# Patient Record
Sex: MALE | Race: WHITE | Employment: OTHER | URBAN - METROPOLITAN AREA
[De-identification: names, ages, dates, MRNs, and addresses within clinical notes are randomized per-mention and may not be internally consistent; named-entity substitution may affect disease eponyms.]

---

## 2021-06-28 ENCOUNTER — TELEPHONE (OUTPATIENT)
Dept: PULMONOLOGY | Age: 56
End: 2021-06-28

## 2021-06-28 NOTE — TELEPHONE ENCOUNTER
Pt calling to let you know that his sob has increased a lot with all the humidity we have been having lately and nothing has been helping. He said he does not think it is from his heart. He has a follow up scheduled with you on 8/6/21. Please advise if he should be seen sooner than this date?

## 2021-06-29 NOTE — TELEPHONE ENCOUNTER
I attempted to contact patient. Phone number we have listed for patient is incorrect. I also tried to call [facility] and that number is no longer in service.     Hopefully patient will call back and we can try to schedule him for Thursday, 7/1/21 @ 1 PM.

## 2021-07-30 RX ORDER — ATORVASTATIN CALCIUM 40 MG/1
TABLET, FILM COATED ORAL
COMMUNITY
Start: 2021-05-13

## 2021-07-30 RX ORDER — CARVEDILOL 12.5 MG/1
TABLET ORAL
COMMUNITY
Start: 2021-05-13

## 2021-07-30 RX ORDER — MONTELUKAST SODIUM 10 MG/1
TABLET ORAL
COMMUNITY
Start: 2021-05-13 | End: 2021-11-19 | Stop reason: SDUPTHER

## 2021-07-30 RX ORDER — TEMAZEPAM 15 MG/1
CAPSULE ORAL
COMMUNITY
Start: 2021-05-28 | End: 2021-11-19 | Stop reason: SDUPTHER

## 2021-07-30 RX ORDER — BUSPIRONE HYDROCHLORIDE 5 MG/1
TABLET ORAL
COMMUNITY
Start: 2021-05-10

## 2021-07-30 RX ORDER — FUROSEMIDE 20 MG/1
TABLET ORAL
COMMUNITY
Start: 2021-06-28

## 2021-07-30 RX ORDER — AMLODIPINE BESYLATE 5 MG/1
TABLET ORAL
COMMUNITY
Start: 2021-05-01

## 2021-07-30 RX ORDER — NITROGLYCERIN 0.4 MG/1
TABLET SUBLINGUAL
COMMUNITY
Start: 2021-06-28

## 2021-07-30 RX ORDER — SACUBITRIL AND VALSARTAN 49; 51 MG/1; MG/1
TABLET, FILM COATED ORAL
COMMUNITY
Start: 2021-05-13

## 2021-07-30 RX ORDER — ALBUTEROL SULFATE 90 UG/1
AEROSOL, METERED RESPIRATORY (INHALATION)
COMMUNITY
Start: 2021-05-28 | End: 2021-11-19 | Stop reason: SDUPTHER

## 2021-07-30 RX ORDER — CHOLECALCIFEROL (VITAMIN D3) 125 MCG
CAPSULE ORAL
COMMUNITY
Start: 2021-05-13

## 2021-07-30 RX ORDER — PAROXETINE HYDROCHLORIDE 20 MG/1
TABLET, FILM COATED ORAL
COMMUNITY
Start: 2021-05-10

## 2021-08-06 ENCOUNTER — OFFICE VISIT (OUTPATIENT)
Dept: PULMONOLOGY | Age: 56
End: 2021-08-06
Payer: MEDICARE

## 2021-08-06 VITALS
TEMPERATURE: 98.1 F | OXYGEN SATURATION: 99 % | WEIGHT: 281.4 LBS | HEART RATE: 71 BPM | BODY MASS INDEX: 40.29 KG/M2 | SYSTOLIC BLOOD PRESSURE: 127 MMHG | RESPIRATION RATE: 16 BRPM | HEIGHT: 70 IN | DIASTOLIC BLOOD PRESSURE: 69 MMHG

## 2021-08-06 DIAGNOSIS — Z87.891 FORMER SMOKER: ICD-10-CM

## 2021-08-06 DIAGNOSIS — J44.9 CHRONIC OBSTRUCTIVE PULMONARY DISEASE, UNSPECIFIED COPD TYPE (HCC): Primary | ICD-10-CM

## 2021-08-06 DIAGNOSIS — G47.10 HYPERSOMNOLENCE: ICD-10-CM

## 2021-08-06 DIAGNOSIS — G47.00 INSOMNIA, UNSPECIFIED TYPE: ICD-10-CM

## 2021-08-06 DIAGNOSIS — I50.9 CONGESTIVE HEART FAILURE, UNSPECIFIED HF CHRONICITY, UNSPECIFIED HEART FAILURE TYPE (HCC): ICD-10-CM

## 2021-08-06 DIAGNOSIS — G47.33 OBSTRUCTIVE SLEEP APNEA: ICD-10-CM

## 2021-08-06 DIAGNOSIS — E66.9 CLASS 2 OBESITY WITHOUT SERIOUS COMORBIDITY WITH BODY MASS INDEX (BMI) OF 38.0 TO 38.9 IN ADULT, UNSPECIFIED OBESITY TYPE: ICD-10-CM

## 2021-08-06 PROCEDURE — 94664 DEMO&/EVAL PT USE INHALER: CPT | Performed by: INTERNAL MEDICINE

## 2021-08-06 PROCEDURE — 99214 OFFICE O/P EST MOD 30 MIN: CPT | Performed by: INTERNAL MEDICINE

## 2021-08-06 RX ORDER — TEMAZEPAM 30 MG/1
30 CAPSULE ORAL NIGHTLY PRN
Qty: 30 CAPSULE | Refills: 2 | Status: SHIPPED | OUTPATIENT
Start: 2021-08-06 | End: 2021-08-20

## 2021-08-06 ASSESSMENT — ENCOUNTER SYMPTOMS
ALLERGIC/IMMUNOLOGIC NEGATIVE: 1
RESPIRATORY NEGATIVE: 1
GASTROINTESTINAL NEGATIVE: 1
EYES NEGATIVE: 1

## 2021-08-06 ASSESSMENT — SLEEP AND FATIGUE QUESTIONNAIRES
HOW LIKELY ARE YOU TO NOD OFF OR FALL ASLEEP WHILE SITTING AND READING: 3
HOW LIKELY ARE YOU TO NOD OFF OR FALL ASLEEP IN A CAR, WHILE STOPPED FOR A FEW MINUTES IN TRAFFIC: 0
HOW LIKELY ARE YOU TO NOD OFF OR FALL ASLEEP WHILE LYING DOWN TO REST IN THE AFTERNOON WHEN CIRCUMSTANCES PERMIT: 2
HOW LIKELY ARE YOU TO NOD OFF OR FALL ASLEEP WHILE WATCHING TV: 2
ESS TOTAL SCORE: 10
HOW LIKELY ARE YOU TO NOD OFF OR FALL ASLEEP WHILE SITTING QUIETLY AFTER LUNCH WITHOUT ALCOHOL: 1
HOW LIKELY ARE YOU TO NOD OFF OR FALL ASLEEP WHEN YOU ARE A PASSENGER IN A CAR FOR AN HOUR WITHOUT A BREAK: 1
HOW LIKELY ARE YOU TO NOD OFF OR FALL ASLEEP WHILE SITTING AND TALKING TO SOMEONE: 0
HOW LIKELY ARE YOU TO NOD OFF OR FALL ASLEEP WHILE SITTING INACTIVE IN A PUBLIC PLACE: 1

## 2021-08-06 NOTE — PROGRESS NOTES
MA Communication:   The following orders are received by verbal communication from Elier Cueva MD    Orders include:  PFT and 3 mo fu scheduled 11/5/21

## 2021-08-06 NOTE — PATIENT INSTRUCTIONS
ASSESSMENT/PLAN:  1. Chronic obstructive pulmonary disease, unspecified COPD type ([address]  2. Obstructive sleep apnea  3. Hypersomnolence  4. Class 2 obesity without serious comorbidity with body mass index (BMI) of 38.0 to 38.9 in adult, unspecified obesity type  5. Insomnia, unspecified type  6. Former smoker  7. Congestive heart failure, unspecified HF chronicity, unspecified heart failure type ([address]    Advised to avoid driving when too sleepy to function safely and given a discussion of the risks of untreated apnea such as accidents, cognitive impairment, mood impairment, high blood pressure, various cardiac diseases and stroke. Weight loss was encouraged. Sleep study showed ahi of 52  Done as split night study  Done 9/4/18  Wt was 258 lbs  Started on autopap of min of 12 and max of 18    DME is [facility]  Set up date 4/3/21  autopap is set up at min of 13 and max of 17  epr is off  Standard response  Nasal mask- Wisp    Using 100% of time  Using 8 h/night    90% pressure is 16.3  Leak at 24 lpm  No sleep apnea, ahi is 2.8    I have access via [employer]    I will increase min to 14 and max of 18  epr is 3  Call me 1 month    Sinus  flonase 2 spray/nostril at night, as needed  singulair once a night    12/2/19  FENO was 6 ppb      8/4/18  PFT  TEST DATE:  08/03/2018     TEST PERFORMED:  Spirometry pre and post bronchodilators, lung volumes, diffusion capacity. FINDINGS:  Effort is reproducible and adequate for interpretation.  Expiratory flow rates are normal but both FEV-1 and FVC fall below the normal range.  There is a borderline improvement after bronchodilators.  Lung volumes showed normal total lung capacity.  Diffusion capacity mildly reduced to 71% predicted.  Oxygen saturation on 2 liters 98%.      IMPRESSION:  Probable combined restrictive and obstructive defect based on bronchodilator response.  The overall degree is mild.  Diffusion capacity is mildly reduced.  Oxygen saturation on 2 liters within normal range. Asthma  Albuterol (ventolin) as needed  has spacer  Not really helping  singulair     Will give   stiolto 2 puffs once a day, given  First dose in office and explained how it works      Tried and failed with  Symbicort  qvar  stiolto  anoro- breathing more labored  Trelegy- caused bp problems    I suspect the cardiac disease has more to do with Mando's breathing issues  I have tried multiple inhalers and still no relief  I would suggest more cardiac intervention    Sleepiness is better    Stopped all vaping and smoking - > 3 years    Insomnia  Will start on   Restoril at 30 mg at night      Patient has an ejection fraction of less than 25%, being evaluated by cardiology. Patient is on multiple medications for this. Patient also has elevated creatinine last creatinine, was 5/26/2021 which was 1.66.    RTC in 3 months    Remember to bring a list of pulmonary medications and any CPAP or BiPAP machines to your next appointment with the office. Please keep all of your future appointments scheduled by [facility] office. Out of respect for other patients and providers, you may be asked to reschedule your appointment if you arrive later than your scheduled appointment time. Appointments cancelled less than 24hrs in advance will be considered a no show. Patients with three missed appointments within 1 year or four missed appointments within 2 years can be dismissed from the practice. Please be aware that our physicians are required to work in the Intensive Care Unit at [facility].  Your appointment may need to be rescheduled if they are designated to work during your appointment time. You may receive a survey regarding the care you received during your visit. Your input is valuable to us. We encourage you to complete and return your survey. We hope you will choose us in the future for your healthcare needs.      Pt instructed of all future appointment dates & times, including radiology, labs, procedures & referrals. If procedures were scheduled preparation instructions provided. Instructions on future appointments with [facility] were given.

## 2021-08-06 NOTE — PROGRESS NOTES
Liberty Wilkerson (:  1965) is a 60 y.o. male,Established patient, here for evaluation of the following chief complaint(s):  Follow-up (pulm/sleep transfer from [facility])         ASSESSMENT/PLAN:  1. Chronic obstructive pulmonary disease, unspecified COPD type ([facility]  2. Obstructive sleep apnea  3. Hypersomnolence  4. Class 2 obesity without serious comorbidity with body mass index (BMI) of 38.0 to 38.9 in adult, unspecified obesity type  5. Insomnia, unspecified type  6. Former smoker  7. Congestive heart failure, unspecified HF chronicity, unspecified heart failure type ([facility]    Advised to avoid driving when too sleepy to function safely and given a discussion of the risks of untreated apnea such as accidents, cognitive impairment, mood impairment, high blood pressure, various cardiac diseases and stroke. Weight loss was encouraged. Sleep study showed ahi of 52  Done as split night study  Done 18  Wt was 258 lbs  Started on autopap of min of 12 and max of 18    DME is [facility]  Set up date 4/3/21  autopap is set up at min of 13 and max of 17  epr is off  Standard response  Nasal mask- Wisp    Using 100% of time  Using 8 h/night    90% pressure is 16.3  Leak at 24 lpm  No sleep apnea, ahi is 2.8    I have access via [employer]    I will increase min to 14 and max of 18  epr is 3  Call me 1 month    Sinus  flonase 2 spray/nostril at night, as needed  singulair once a night    19  FENO was 6 ppb      18  PFT  TEST DATE:  2018     TEST PERFORMED:  Spirometry pre and post bronchodilators, lung volumes, diffusion capacity. FINDINGS:  Effort is reproducible and adequate for interpretation.  Expiratory flow rates are normal but both FEV-1 and FVC fall below the normal range.  There is a borderline improvement after bronchodilators.  Lung volumes showed normal total lung capacity.  Diffusion capacity mildly reduced to 71% predicted.  Oxygen saturation on 2 liters 98%. IMPRESSION:  Probable combined restrictive and obstructive defect based on bronchodilator response.  The overall degree is mild.  Diffusion capacity is mildly reduced.  Oxygen saturation on 2 liters within normal range. Asthma  Albuterol (ventolin) as needed  has spacer  Not really helping  singulair     Will give   stiolto 2 puffs once a day, given  First dose in office and explained how it works      Tried and failed with  Symbicort  qvar  stiolto  anoro- breathing more labored  Trelegy- caused bp problems    I suspect the cardiac disease has more to do with Mando's breathing issues  I have tried multiple inhalers and still no relief  I would suggest more cardiac intervention    Sleepiness is better    Stopped all vaping and smoking - > 3 years    Insomnia  Will start on   Restoril at 30 mg at night      Patient has an ejection fraction of less than 25%, being evaluated by cardiology. Patient is on multiple medications for this. Patient also has elevated creatinine last creatinine, was 5/26/2021 which was 1.66.    RTC in 3 months                  No follow-ups on file. Subjective   SUBJECTIVE/OBJECTIVE:      Seen in hospital [facility]    52year-old gentleman who was admitted on 08/01 because of waking up in the middle of the morning having an episode of palpitations, shortness of breath, and then all of a sudden feeling \"weird\".  The patient has a history of coronary disease, obesity, hypertension, and has been taking medications for many years, about 10 years, came into the ER showing SVT and then finally had to have cardioversion. The patient has been seen by Cardiology.     He does report that he snores. He has witnessed apneas, waking up, chocking, gasping, nocturia about three to four x a night, dry mouth, sleepiness, hypersomnolence at night. Neck size is 19 inches and [place] is about 12 out of 24. The patient denies any RLS symptoms.  Sleepy during the daytime.     He does have a history of smoking, has complained of shortness of breath, no chest pain, has complained of shortness of breath, no chest pain, some wheezing, has more shortness of breath since coming and having the cardioversion.           PAST MEDICAL HISTORY:  1.  Hypertension. 2.  Antidepressive disorder. 3. Coronary artery disease. 4. Anxiety. 5. Acute malfunction in the past.     PAST SURGICAL HISTORY: Cardiac catheterization.     FAMILY HISTORY:  Positive for diabetes, stroke, heart disease, alcohol abuse in the family.     SOCIAL HISTORY:  Current smoker of about half pack and had been a heavy smoker in the past. Positive 30+ pack years.  Alcohol use, 5 to 6 beers about once a month, and some occasional marijuana use.     ALLERGIES:  Are aspirin and Crestor.       Discussion /Plan:        1. PFT c/w asthma most likely  2. Would plan on d/c with albuterol with spacer as needed  3. Stop smoking  4. Will set up with outpatient sleep study  5. Will do split night  6. Will get back in the office after the study  7. Order in computer  8.  Can d/c when ready     Tian Hussein MD     [facility]  [phone] [facility])    Since having cpap    Using humidity  And having some sinus    Having congestion in the sinus    breathign has been doing well    Recently feeling like he is suffocating  Feels like he is having water back in his lungs  \\    Going to bed at 11 pm and waking up 830 am  No snoring  No bloating  No burping    No chest pain  No ear popping    No rls  No plms    Pressure feels good    Having issues with the machine  Having sinus issues  And some pressure issues  No chest pain      More issues with the sinus  May     Feels like somehting in chest      Now with pacemaker and defib    Some breathing issues since getting exposed to mold    Having issues with the cpap    Has tried symbicort and qvar  Ventolin    In the center of the chest has some breathing/\"suffocating\" sensation  But the oxygen levels are good    Having more issues with fatigue and some insomnia  Has tried restoril in the past and seemed to help                 Patient was last seen by Dr. Irina Collado on 6/28/2021    1. Ischemic cardiomyopathy:   His LVEF is still severely reduced although modestly improved to 25%. His blood pressure is elevated although he has some dizziness interestingly enough. Continue on low dose Entresto.    Discontinued his enalapril. Continue his beta-blocker. 2. Hypertension:   Continue Toprol somewhat 100 mg per day, which is a higher dose. 3. LBBB:   He has some fatigue nearing the end of the day, which may be multifactorial from his obesity as well as COPD and his cardiomyopathy. We will discuss with EP whether a CRT device would be beneficial or not. Since last visit  Having more issues with the heat  Using albuterol more    Snoring  And waking more at night  Sleepiness is getting worse      Review of Systems   Constitutional: Negative. HENT: Negative. Eyes: Negative. Respiratory: Negative. Cardiovascular: Negative. Gastrointestinal: Negative. Endocrine: Negative. Genitourinary: Negative. Musculoskeletal: Negative. Skin: Negative. Allergic/Immunologic: Negative. Neurological: Negative. Hematological: Negative. Psychiatric/Behavioral: Negative. Objective   Physical Exam  Vitals and nursing note reviewed. Constitutional:       General: He is not in acute distress. Appearance: Normal appearance. He is not ill-appearing. HENT:      Head: Normocephalic and atraumatic. Right Ear: External ear normal.      Left Ear: External ear normal.      Nose: Nose normal.      Mouth/Throat:      Mouth: Mucous membranes are moist.      Pharynx: Oropharynx is clear. Comments: Mallampati 3  Eyes:      General: No scleral icterus. Extraocular Movements: Extraocular movements intact.       Conjunctiva/sclera: Conjunctivae normal.      Pupils: Pupils are equal, round, and reactive to light. Cardiovascular:      Rate and Rhythm: Normal rate and regular rhythm. Pulses: Normal pulses. Heart sounds: Normal heart sounds. No murmur heard. No friction rub. Pulmonary:      Effort: No respiratory distress. Breath sounds: No stridor. No wheezing or rhonchi. Comments: Dec bs b/l  Abdominal:      General: Abdomen is flat. Bowel sounds are normal. There is no distension. Tenderness: There is no abdominal tenderness. There is no guarding. Musculoskeletal:         General: No swelling or tenderness. Normal range of motion. Cervical back: Normal range of motion and neck supple. No rigidity. Skin:     General: Skin is warm and dry. Coloration: Skin is not jaundiced. Neurological:      General: No focal deficit present. Mental Status: He is alert and oriented to person, place, and time. Mental status is at baseline. Cranial Nerves: No cranial nerve deficit. Sensory: No sensory deficit. Motor: No weakness. Gait: Gait normal.   Psychiatric:         Mood and Affect: Mood normal.         Thought Content: Thought content normal.         Judgment: Judgment normal.            On this date 8/6/2021 I have spent 25 minutes reviewing previous notes, test results and face to face with the patient discussing the diagnosis and importance of compliance with the treatment plan as well as documenting on the day of the visit.       An electronic signature was used to authenticate this note.    --Miguel Mitchell MD

## 2021-08-06 NOTE — LETTER
8/6/21        Dalene Bumpers      I have seen this patient in the office today and wanted to communicate my findings and recommendations. Patient Instructions      ASSESSMENT/PLAN:  1. Chronic obstructive pulmonary disease, unspecified COPD type ([address]  2. Obstructive sleep apnea  3. Hypersomnolence  4. Class 2 obesity without serious comorbidity with body mass index (BMI) of 38.0 to 38.9 in adult, unspecified obesity type  5. Insomnia, unspecified type  6. Former smoker  7. Congestive heart failure, unspecified HF chronicity, unspecified heart failure type ([address]    Advised to avoid driving when too sleepy to function safely and given a discussion of the risks of untreated apnea such as accidents, cognitive impairment, mood impairment, high blood pressure, various cardiac diseases and stroke. Weight loss was encouraged. Sleep study showed ahi of 52  Done as split night study  Done 9/4/18  Wt was 258 lbs  Started on autopap of min of 12 and max of 18    DME is [facility]  Set up date 4/3/21  autopap is set up at min of 13 and max of 17  epr is off  Standard response  Nasal mask- Wisp    Using 100% of time  Using 8 h/night    90% pressure is 16.3  Leak at 24 lpm  No sleep apnea, ahi is 2.8    I have access via [employer]    I will increase min to 14 and max of 18  epr is 3  Call me 1 month    Sinus  flonase 2 spray/nostril at night, as needed  singulair once a night    12/2/19  FENO was 6 ppb      8/4/18  PFT  TEST DATE:  08/03/2018     TEST PERFORMED:  Spirometry pre and post bronchodilators, lung volumes, diffusion capacity. FINDINGS:  Effort is reproducible and adequate for interpretation.  Expiratory flow rates are normal but both FEV-1 and FVC fall below the normal range.  There is a borderline improvement after bronchodilators.  Lung volumes showed normal total lung capacity.  Diffusion capacity mildly reduced to 71% predicted.  Oxygen saturation on 2 liters 98%.      IMPRESSION: Grecia Gruber combined restrictive and obstructive defect based on bronchodilator response.  The overall degree is mild.  Diffusion capacity is mildly reduced.  Oxygen saturation on 2 liters within normal range. Asthma  Albuterol (ventolin) as needed  has spacer  Not really helping  singulair     Will give   stiolto 2 puffs once a day, given  First dose in office and explained how it works      Tried and failed with  Symbicort  qvar  stiolto  anoro- breathing more labored  Trelegy- caused bp problems    I suspect the cardiac disease has more to do with Mando's breathing issues  I have tried multiple inhalers and still no relief  I would suggest more cardiac intervention    Sleepiness is better    Stopped all vaping and smoking - > 3 years    Insomnia  Will start on   Restoril at 30 mg at night      Patient has an ejection fraction of less than 25%, being evaluated by cardiology. Patient is on multiple medications for this.   Patient also has elevated creatinine last creatinine, was 5/26/2021 which was 1.66.    RTC in 3 months                   Thank you for allowing me to assist in the care of the Jessica Painter MD

## 2021-11-05 ENCOUNTER — TELEPHONE (OUTPATIENT)
Dept: PULMONOLOGY | Age: 56
End: 2021-11-05

## 2021-11-05 NOTE — LETTER
[facility] Critical Care and Sleep  Jenifer Antonio [address] [place] [address] Road  Phone: [phone]  Fax: [phone]    Kacie Munoz MD        November 18, 2021    [facility] [address]      Dear Tierra Holcomb:      I am writing you because I have been informed of your missed appointment(s) and your missed PFT WHICH WAS SCHEDULED FOR 11/5/21 AND OV 11/5/21  We ask that you please call 1 business day in advance if you are unable to make your appointment so that we can give that time to another patient in need. We care about you and the management of your healthcare and want to make sure that you follow up as recommended. As your Pulmonologist I must stress to you how important it is for you to have the tests I order as well to see me in follow up. The tests are necessary so that I can monitor your pulmonary nodule for any changes that could be cancer. I have to also mention, that in an effort to provide quality care and timely appointments to all my patients, it is our policy that patients be discharged from the practice if they do not show for three scheduled appointments. You would be informed of this termination by mail, and would have 30 days from the date of discharge to locate another physician. We're sorry you were unable to keep your appointment and hope that you are doing well. We would like to continue treating your healthcare needs. Please call the office to let us know your plans for treatment and to reschedule your appointment. If you have any questions or concerns, please don't hesitate to call.     Sincerely,        Kacie Munoz MD

## 2021-11-05 NOTE — LETTER
[facility] Critical [facility] Sleep  Jenifer Antonio [address] [facility]  Phone: [phone]  Fax: [phone]    Fco Alvarez MD        November 18, 2021    [facility] [address]      Dear Mariam Beckwith:      I am writing you because I have been informed of your missed appointment(s) and your missed PFT WHICH WAS SCHEDULED FOR 11/5/21 AND OV 11/5/21  We ask that you please call 1 business day in advance if you are unable to make your appointment so that we can give that time to another patient in need. We care about you and the management of your healthcare and want to make sure that you follow up as recommended. As your Pulmonologist I must stress to you how important it is for you to have the tests I order as well to see me in follow up. The tests are necessary so that I can monitor your pulmonary nodule for any changes that could be cancer. I have to also mention, that in an effort to provide quality care and timely appointments to all my patients, it is our policy that patients be discharged from the practice if they do not show for three scheduled appointments. You would be informed of this termination by mail, and would have 30 days from the date of discharge to locate another physician. We're sorry you were unable to keep your appointment and hope that you are doing well. We would like to continue treating your healthcare needs. Please call the office to let us know your plans for treatment and to reschedule your appointment. If you have any questions or concerns, please don't hesitate to call.     Sincerely,        Fco Alvarez MD

## 2021-11-05 NOTE — TELEPHONE ENCOUNTER
Patient did not show for 3 mo PFT follow up appointment  with Dr. Mat Cloud on 11/5/21    Same Day Cancellation: No    Patient rescheduled:  No    New appointment:     Patient was also no show on: N/A    Pt No Showed for PFT today as well

## 2021-11-09 NOTE — TELEPHONE ENCOUNTER
Call patient to reschedule a not show appt on 11/05/21 with Dr Dinorah Barajas, patient phone is not working at this time.

## 2021-11-19 DIAGNOSIS — G47.00 INSOMNIA, UNSPECIFIED TYPE: Primary | ICD-10-CM

## 2021-11-19 RX ORDER — ALBUTEROL SULFATE 90 UG/1
AEROSOL, METERED RESPIRATORY (INHALATION)
Qty: 18 G | Refills: 11 | Status: SHIPPED | OUTPATIENT
Start: 2021-11-19 | End: 2021-11-22

## 2021-11-19 RX ORDER — MONTELUKAST SODIUM 10 MG/1
TABLET ORAL
Qty: 30 TABLET | Refills: 11 | Status: SHIPPED | OUTPATIENT
Start: 2021-11-19 | End: 2021-11-22

## 2021-11-19 RX ORDER — TEMAZEPAM 30 MG/1
CAPSULE ORAL
Qty: 30 CAPSULE | Refills: 3 | Status: SHIPPED | OUTPATIENT
Start: 2021-11-19 | End: 2021-12-19

## 2021-11-19 NOTE — TELEPHONE ENCOUNTER
Patient call for DX refill on Montelukast 10mg tablet,albuterol sulfate  (90) and Restoril 30mg (dosage change on pt last visit 8/6/21)    Jinny

## 2021-11-22 RX ORDER — MONTELUKAST SODIUM 10 MG/1
TABLET ORAL
Qty: 90 TABLET | Refills: 3 | Status: SHIPPED | OUTPATIENT
Start: 2021-11-22 | End: 2022-01-04 | Stop reason: SDUPTHER

## 2021-11-22 RX ORDER — ALBUTEROL SULFATE 90 UG/1
AEROSOL, METERED RESPIRATORY (INHALATION)
Qty: 54 G | Refills: 3 | Status: SHIPPED | OUTPATIENT
Start: 2021-11-22 | End: 2021-12-15

## 2021-12-15 RX ORDER — ALBUTEROL SULFATE 90 UG/1
AEROSOL, METERED RESPIRATORY (INHALATION)
Qty: 3 EACH | Refills: 3 | Status: SHIPPED | OUTPATIENT
Start: 2021-12-15 | End: 2022-01-04 | Stop reason: SDUPTHER

## 2022-01-05 RX ORDER — MONTELUKAST SODIUM 10 MG/1
TABLET ORAL
Qty: 90 TABLET | Refills: 3 | Status: SHIPPED | OUTPATIENT
Start: 2022-01-05

## 2022-01-05 RX ORDER — ALBUTEROL SULFATE 90 UG/1
AEROSOL, METERED RESPIRATORY (INHALATION)
Qty: 3 EACH | Refills: 3 | Status: SHIPPED | OUTPATIENT
Start: 2022-01-05 | End: 2022-01-07 | Stop reason: SDUPTHER

## 2022-01-07 RX ORDER — ALBUTEROL SULFATE 90 UG/1
AEROSOL, METERED RESPIRATORY (INHALATION)
Qty: 3 EACH | Refills: 3 | Status: SHIPPED | OUTPATIENT
Start: 2022-01-07 | End: 2022-10-24

## 2022-01-10 ENCOUNTER — TELEPHONE (OUTPATIENT)
Dept: PULMONOLOGY | Age: 57
End: 2022-01-10

## 2022-01-10 DIAGNOSIS — G47.00 INSOMNIA, UNSPECIFIED TYPE: Primary | ICD-10-CM

## 2022-01-10 RX ORDER — TEMAZEPAM 30 MG/1
30 CAPSULE ORAL NIGHTLY PRN
Qty: 90 CAPSULE | Refills: 1 | Status: SHIPPED | OUTPATIENT
Start: 2022-01-10 | End: 2022-04-10

## 2022-01-10 RX ORDER — TEMAZEPAM 30 MG/1
30 CAPSULE ORAL NIGHTLY PRN
Qty: 30 CAPSULE | Refills: 3 | Status: SHIPPED | OUTPATIENT
Start: 2022-01-10 | End: 2022-01-24

## 2022-01-10 NOTE — TELEPHONE ENCOUNTER
We received a refill request from [address] that patient is requesting refill of Temazepam 30 mg.  I looked through [facility] records and I only see he was on 15 mg. Please review before signing.

## 2022-01-12 RX ORDER — FLUTICASONE PROPIONATE 50 MCG
2 SPRAY, SUSPENSION (ML) NASAL DAILY
Qty: 3 EACH | Refills: 2 | Status: SHIPPED | OUTPATIENT
Start: 2022-01-12 | End: 2022-08-22

## 2022-01-19 ENCOUNTER — TELEPHONE (OUTPATIENT)
Dept: PULMONOLOGY | Age: 57
End: 2022-01-19

## 2022-01-19 NOTE — TELEPHONE ENCOUNTER
Faxed script for temazepam 30 mg was dc'd as it was requested from pharm in error.  They have cancelled and will not ship as they did ship the one  That was e scribed on 1/10/22

## 2022-01-26 ENCOUNTER — TELEPHONE (OUTPATIENT)
Dept: PULMONOLOGY | Age: 57
End: 2022-01-26

## 2022-01-26 DIAGNOSIS — G47.33 OBSTRUCTIVE SLEEP APNEA: Primary | ICD-10-CM

## 2022-01-26 NOTE — TELEPHONE ENCOUNTER
Office cancelled appointment on 2/10/22 with Dr. Leon Jiménez for PFT fu. We LM on 1/19/22, 1/21/22 and 1/26/22 and informed pt we would need to cancel appt and requested pt to call office back to reschedule appt. Pt has not returned multiple calls. We will wait for pt to contact the office.

## 2022-08-22 RX ORDER — FLUTICASONE PROPIONATE 50 MCG
SPRAY, SUSPENSION (ML) NASAL
Qty: 3 EACH | Refills: 0 | Status: SHIPPED | OUTPATIENT
Start: 2022-08-22

## 2022-09-12 ENCOUNTER — TELEPHONE (OUTPATIENT)
Dept: PULMONOLOGY | Age: 57
End: 2022-09-12

## 2022-09-14 ENCOUNTER — OFFICE VISIT (OUTPATIENT)
Dept: PULMONOLOGY | Age: 57
End: 2022-09-14
Payer: MEDICARE

## 2022-09-14 VITALS
HEART RATE: 74 BPM | SYSTOLIC BLOOD PRESSURE: 128 MMHG | BODY MASS INDEX: 41.37 KG/M2 | DIASTOLIC BLOOD PRESSURE: 73 MMHG | WEIGHT: 289 LBS | TEMPERATURE: 97.6 F | OXYGEN SATURATION: 95 % | RESPIRATION RATE: 16 BRPM | HEIGHT: 70 IN

## 2022-09-14 DIAGNOSIS — G47.33 OBSTRUCTIVE SLEEP APNEA: ICD-10-CM

## 2022-09-14 DIAGNOSIS — G47.10 HYPERSOMNOLENCE: ICD-10-CM

## 2022-09-14 DIAGNOSIS — Z87.891 FORMER SMOKER: ICD-10-CM

## 2022-09-14 DIAGNOSIS — R06.02 SOB (SHORTNESS OF BREATH): Primary | ICD-10-CM

## 2022-09-14 DIAGNOSIS — J44.9 CHRONIC OBSTRUCTIVE PULMONARY DISEASE, UNSPECIFIED COPD TYPE (HCC): ICD-10-CM

## 2022-09-14 DIAGNOSIS — E66.9 CLASS 2 OBESITY WITHOUT SERIOUS COMORBIDITY WITH BODY MASS INDEX (BMI) OF 38.0 TO 38.9 IN ADULT, UNSPECIFIED OBESITY TYPE: ICD-10-CM

## 2022-09-14 DIAGNOSIS — G47.00 INSOMNIA, UNSPECIFIED TYPE: ICD-10-CM

## 2022-09-14 PROCEDURE — 3017F COLORECTAL CA SCREEN DOC REV: CPT | Performed by: INTERNAL MEDICINE

## 2022-09-14 PROCEDURE — 94621 CARDIOPULM EXERCISE TESTING: CPT | Performed by: INTERNAL MEDICINE

## 2022-09-14 PROCEDURE — 3023F SPIROM DOC REV: CPT | Performed by: INTERNAL MEDICINE

## 2022-09-14 PROCEDURE — 1036F TOBACCO NON-USER: CPT | Performed by: INTERNAL MEDICINE

## 2022-09-14 PROCEDURE — G8427 DOCREV CUR MEDS BY ELIG CLIN: HCPCS | Performed by: INTERNAL MEDICINE

## 2022-09-14 PROCEDURE — G8417 CALC BMI ABV UP PARAM F/U: HCPCS | Performed by: INTERNAL MEDICINE

## 2022-09-14 PROCEDURE — 99214 OFFICE O/P EST MOD 30 MIN: CPT | Performed by: INTERNAL MEDICINE

## 2022-09-14 ASSESSMENT — ENCOUNTER SYMPTOMS
EYES NEGATIVE: 1
GASTROINTESTINAL NEGATIVE: 1
ALLERGIC/IMMUNOLOGIC NEGATIVE: 1
RESPIRATORY NEGATIVE: 1

## 2022-09-14 NOTE — PROGRESS NOTES
MA Communication:   The following orders are received by verbal communication from Staci La MD    Orders include:  Order for CPET faxed to [facility] and also given to pt to call and schedule       3 mo fu scheduled 12/7/22

## 2022-09-14 NOTE — LETTER
1200 [facility] Pulmonary Critical Care and Sleep  0009 [facility] [address] [address]  Phone: [phone]  Fax: [phone]    Deandre Damon MD        September 14, 2022     Patient: Justyna Lopez   YOB: 1965   Date of Visit: 9/14/2022 9/14/22        Justyna Lopez      I have seen this patient in the office today and wanted to communicate my findings and recommendations. Patient Instructions        ASSESSMENT/PLAN:  1. Obstructive sleep apnea  2. Insomnia, unspecified type  3. Chronic obstructive pulmonary disease, unspecified COPD type ([address]  4. Hypersomnolence  5. Class 2 obesity without serious comorbidity with body mass index (BMI) of 38.0 to 38.9 in adult, unspecified obesity type  6. Former smoker    Advised to avoid driving when too sleepy to function safely and given a discussion of the risks of untreated apnea such as accidents, cognitive impairment, mood impairment, high blood pressure, various cardiac diseases and stroke. Weight loss was encouraged. Sleep study showed ahi of 52  Done as split night study  Done 9/4/18  Wt was 258 lbs  Started on autopap of min of 12 and max of 18    DME is [facility]  Set up date 4/3/21  autopap is set up at min of 14 and max of 18  epr is off  Standard response  Nasal mask- Wisp    Using 100% of time  Using 10 h/night    90% pressure is 16.6  Leak at 25 lpm  No sleep apnea, ahi is 3.2    I have access via airview        Sinus  flonase 2 spray/nostril at night, as needed  singulair once a night    12/2/19  FENO was 6 ppb      8/4/18  PFT  TEST DATE:  08/03/2018     TEST PERFORMED:  Spirometry pre and post bronchodilators, lung volumes, diffusion capacity. FINDINGS:  Effort is reproducible and adequate for interpretation. Expiratory flow rates are normal but both FEV-1 and FVC fall below the normal range. There is a borderline improvement after bronchodilators.   Lung volumes showed normal total lung capacity. Diffusion capacity mildly reduced to 71% predicted. Oxygen saturation on 2 liters 98%. IMPRESSION:  Probable combined restrictive and obstructive defect based on bronchodilator response. The overall degree is mild. Diffusion capacity is mildly reduced. Oxygen saturation on 2 liters within normal range. Asthma/COPD  Albuterol (ventolin) as needed  has spacer  Not really helping  singulair     9/14/22  FENO was <5    Will get   Cardiopulmonary exercise testing      Tried and failed with  Symbicort  qvar  stiolto  anoro- breathing more labored  Trelegy- caused bp problems  Most inhalers causing issues with breathing    I suspect the cardiac disease has more to do with Mando's breathing issues  I have tried multiple inhalers and still no relief      Sleepiness is better    Stopped all vaping and smoking - > 3 years    Insomnia  On   Klonopin from the Psychiatry        Patient has an ejection fraction of less than 25%, being evaluated by cardiology. Patient is on multiple medications for this. Patient also has elevated creatinine last creatinine, was 5/26/2021 which was 1.66.       Shortness of breath could be multifactorial  Does smoke MJ  Weight  Panic attack      RTC in 3 months                   Thank you for allowing me to assist in the care of the MD Levi Cheney MD

## 2022-09-14 NOTE — PATIENT INSTRUCTIONS
ASSESSMENT/PLAN:  1. Obstructive sleep apnea  2. Insomnia, unspecified type  3. Chronic obstructive pulmonary disease, unspecified COPD type ([facility]  4. Hypersomnolence  5. Class 2 obesity without serious comorbidity with body mass index (BMI) of 38.0 to 38.9 in adult, unspecified obesity type  6. Former smoker    Advised to avoid driving when too sleepy to function safely and given a discussion of the risks of untreated apnea such as accidents, cognitive impairment, mood impairment, high blood pressure, various cardiac diseases and stroke. Weight loss was encouraged. Sleep study showed ahi of 52  Done as split night study  Done 9/4/18  Wt was 258 lbs  Started on autopap of min of 12 and max of 18    DME is [facility]  Set up date 4/3/21  autopap is set up at min of 14 and max of 18  epr is off  Standard response  Nasal mask- Wisp    Using 100% of time  Using 10 h/night    90% pressure is 16.6  Leak at 25 lpm  No sleep apnea, ahi is 3.2    I have access via airview        Sinus  flonase 2 spray/nostril at night, as needed  singulair once a night    12/2/19  FENO was 6 ppb      8/4/18  PFT  TEST DATE:  08/03/2018     TEST PERFORMED:  Spirometry pre and post bronchodilators, lung volumes, diffusion capacity. FINDINGS:  Effort is reproducible and adequate for interpretation. Expiratory flow rates are normal but both FEV-1 and FVC fall below the normal range. There is a borderline improvement after bronchodilators. Lung volumes showed normal total lung capacity. Diffusion capacity mildly reduced to 71% predicted. Oxygen saturation on 2 liters 98%. IMPRESSION:  Probable combined restrictive and obstructive defect based on bronchodilator response. The overall degree is mild. Diffusion capacity is mildly reduced. Oxygen saturation on 2 liters within normal range.       Asthma/COPD  Albuterol (ventolin) as needed  has spacer  Not really helping  singulair     9/14/22  FENO was <5    Will get procedures were scheduled preparation instructions provided. Instructions on future appointments with [facility]monary were given.

## 2022-09-14 NOTE — PROGRESS NOTES
Brina Wilkes (:  1965) is a 59 y.o. male,Established patient, here for evaluation of the following chief complaint(s):  COPD         ASSESSMENT/PLAN:  1. Obstructive sleep apnea  2. Insomnia, unspecified type  3. Chronic obstructive pulmonary disease, unspecified COPD type ([facility]  4. Hypersomnolence  5. Class 2 obesity without serious comorbidity with body mass index (BMI) of 38.0 to 38.9 in adult, unspecified obesity type  6. Former smoker    Advised to avoid driving when too sleepy to function safely and given a discussion of the risks of untreated apnea such as accidents, cognitive impairment, mood impairment, high blood pressure, various cardiac diseases and stroke. Weight loss was encouraged. Sleep study showed ahi of 52  Done as split night study  Done 18  Wt was 258 lbs  Started on autopap of min of 12 and max of 18    DME is [facility]  Set up date 4/3/21  autopap is set up at min of 14 and max of 18  epr is off  Standard response  Nasal mask- Wisp    Using 100% of time  Using 10 h/night    90% pressure is 16.6  Leak at 25 lpm  No sleep apnea, ahi is 3.2    I have access via airview        Sinus  flonase 2 spray/nostril at night, as needed  singulair once a night    19  FENO was 6 ppb      18  PFT  TEST DATE:  2018     TEST PERFORMED:  Spirometry pre and post bronchodilators, lung volumes, diffusion capacity. FINDINGS:  Effort is reproducible and adequate for interpretation. Expiratory flow rates are normal but both FEV-1 and FVC fall below the normal range. There is a borderline improvement after bronchodilators. Lung volumes showed normal total lung capacity. Diffusion capacity mildly reduced to 71% predicted. Oxygen saturation on 2 liters 98%. IMPRESSION:  Probable combined restrictive and obstructive defect based on bronchodilator response. The overall degree is mild. Diffusion capacity is mildly reduced.   Oxygen saturation on 2 liters within normal range. Asthma/COPD  Albuterol (ventolin) as needed  has spacer  Not really helping  singulair     9/14/22  FENO was <5    Will get   Cardiopulmonary exercise testing      Tried and failed with  Symbicort  qvar  stiolto  anoro- breathing more labored  Trelegy- caused bp problems  Most inhalers causing issues with breathing    I suspect the cardiac disease has more to do with Mando's breathing issues  I have tried multiple inhalers and still no relief      Sleepiness is better    Stopped all vaping and smoking - > 3 years    Insomnia  On   Klonopin from the Psychiatry        Patient has an ejection fraction of less than 25%, being evaluated by cardiology. Patient is on multiple medications for this. Patient also has elevated creatinine last creatinine, was 5/26/2021 which was 1.66. Shortness of breath could be multifactorial  Does smoke MJ  Weight  Panic attack      RTC in 3 months                  No follow-ups on file. I have personally reviewed and summarized the old records       I have independently reviewed the images and reviewed with patient    I have reviewed the lab tests, radiology reports and medications    I have downloaded and interpreted the cpap/bipap/pap data. I have made adjustments as described    Reviewed present meds and side effects. Continue present meds. Stay compliant. Call if worsens          Subjective   SUBJECTIVE/OBJECTIVE:      Seen in hospital [address]    52year-old gentleman who was admitted on 08/01 because of waking up in the middle of the morning having an episode of palpitations, shortness of breath, and then all of a sudden feeling \"weird\". The patient has a history of coronary disease, obesity, hypertension, and has been taking medications for many years, about 10 years, came into the ER showing SVT and then finally had to have cardioversion. The patient has been seen by Cardiology. He does report that he snores.  He has witnessed apneas, waking up, chocking, gasping, nocturia about three to four x a night, dry mouth, sleepiness, hypersomnolence at night. Neck size is 19 inches and [place] is about 12 out of 24. The patient denies any RLS symptoms. Sleepy during the daytime. He does have a history of smoking, has complained of shortness of breath, no chest pain, has complained of shortness of breath, no chest pain, some wheezing, has more shortness of breath since coming and having the cardioversion. PAST MEDICAL HISTORY:  1. Hypertension. 2.  Antidepressive disorder. 3. Coronary artery disease. 4. Anxiety. 5. Acute malfunction in the past.     PAST SURGICAL HISTORY: Cardiac catheterization. FAMILY HISTORY:  Positive for diabetes, stroke, heart disease, alcohol abuse in the family. SOCIAL HISTORY:  Current smoker of about half pack and had been a heavy smoker in the past. Positive 30+ pack years. Alcohol use, 5 to 6 beers about once a month, and some occasional marijuana use. ALLERGIES:  Are aspirin and Crestor. Discussion /Plan:        1. PFT c/w asthma most likely  2. Would plan on d/c with albuterol with spacer as needed  3. Stop smoking  4. Will set up with outpatient sleep study  5. Will do split night  6. Will get back in the office after the study  7. Order in computer  8.  Can d/c when ready     Karlos Sharma MD     [facility]  [phone] Belen Corrigan)    Since having cpap    Using humidity  And having some sinus    Having congestion in the sinus    breathign has been doing well    Recently feeling like he is suffocating  Feels like he is having water back in his lungs  \\    Going to bed at 11 pm and waking up 830 am  No snoring  No bloating  No burping    No chest pain  No ear popping    No rls  No plms    Pressure feels good    Having issues with the machine  Having sinus issues  And some pressure issues  No chest pain      More issues with the sinus  May     Feels like somehting in chest      Now with pacemaker and defib    Some breathing issues since getting exposed to mold    Having issues with the cpap    Has tried symbicort and qvar  Ventolin    In the center of the chest has some breathing/\"suffocating\" sensation  But the oxygen levels are good    Having more issues with fatigue and some insomnia  Has tried restoril in the past and seemed to help                 Patient was last seen by Dr. Twin White on 6/28/2021    1. Ischemic cardiomyopathy:   His LVEF is still severely reduced although modestly improved to 25%. His blood pressure is elevated although he has some dizziness interestingly enough. Continue on low dose Entresto. Discontinued his enalapril. Continue his beta-blocker. 2. Hypertension:   Continue Toprol somewhat 100 mg per day, which is a higher dose. 3. LBBB:   He has some fatigue nearing the end of the day, which may be multifactorial from his obesity as well as COPD and his cardiomyopathy. We will discuss with EP whether a CRT device would be beneficial or not. Having more issues with the heat  Using albuterol more    Snoring  And waking more at night  Sleepiness is getting worse        Since last visit  Had one episode of defib    Was taken off restoril  Now on klonopin from Psych      Albuterol helps at times        Since getting the new machine  No snoring  No bloating  No burping  No ear popping  No dry mouth  No chest pains    Tolerating pressures well  No RLS  No PLM's    Going to bed at 12 -6 am and waking up at  8 am- 2-3 pm            Review of Systems   Constitutional: Negative. HENT: Negative. Eyes: Negative. Respiratory: Negative. Cardiovascular: Negative. Gastrointestinal: Negative. Endocrine: Negative. Genitourinary: Negative. Musculoskeletal: Negative. Skin: Negative. Allergic/Immunologic: Negative. Neurological: Negative. Hematological: Negative. Psychiatric/Behavioral: Negative. Vitals:    09/14/22 1010   BP: 128/73   Site: Left Upper Arm   Position: Sitting   Cuff Size: Large Adult   Pulse: 74   Resp: 16   Temp: 97.6 °F (36.4 °C)   TempSrc: Temporal   SpO2: 95%   Weight: 289 lb (131.1 kg)   Height: 5' 10\" (1.778 m)       Objective   Physical Exam  Vitals and nursing note reviewed. Constitutional:       General: He is not in acute distress. Appearance: Normal appearance. He is not ill-appearing. HENT:      Head: Normocephalic and atraumatic. Right Ear: External ear normal.      Left Ear: External ear normal.      Nose: Nose normal.      Mouth/Throat:      Mouth: Mucous membranes are moist.      Pharynx: Oropharynx is clear. Comments: Mallampati 3  Eyes:      General: No scleral icterus. Extraocular Movements: Extraocular movements intact. Conjunctiva/sclera: Conjunctivae normal.      Pupils: Pupils are equal, round, and reactive to light. Cardiovascular:      Rate and Rhythm: Normal rate and regular rhythm. Pulses: Normal pulses. Heart sounds: Normal heart sounds. No murmur heard. No friction rub. Pulmonary:      Effort: No respiratory distress. Breath sounds: No stridor. No wheezing or rhonchi. Comments: Dec bs b/l  Abdominal:      General: Abdomen is flat. Bowel sounds are normal. There is no distension. Tenderness: There is no abdominal tenderness. There is no guarding. Musculoskeletal:         General: No swelling or tenderness. Normal range of motion. Cervical back: Normal range of motion and neck supple. No rigidity. Skin:     General: Skin is warm and dry. Coloration: Skin is not jaundiced. Neurological:      General: No focal deficit present. Mental Status: He is alert and oriented to person, place, and time. Mental status is at baseline. Cranial Nerves: No cranial nerve deficit. Sensory: No sensory deficit. Motor: No weakness.       Gait: Gait normal.   Psychiatric:         Mood and Affect: Mood normal.         Thought Content: Thought content normal.         Judgment: Judgment normal.          On this date 8/6/2021 I have spent 25 minutes reviewing previous notes, test results and face to face with the patient discussing the diagnosis and importance of compliance with the treatment plan as well as documenting on the day of the visit.       An electronic signature was used to authenticate this note.    --Fco Alvarez MD

## 2022-10-24 RX ORDER — ALBUTEROL SULFATE 90 UG/1
AEROSOL, METERED RESPIRATORY (INHALATION)
Qty: 54 G | Refills: 5 | Status: SHIPPED | OUTPATIENT
Start: 2022-10-24

## 2022-11-10 ENCOUNTER — TELEPHONE (OUTPATIENT)
Dept: PULMONOLOGY | Age: 57
End: 2022-11-10

## 2022-11-10 NOTE — TELEPHONE ENCOUNTER
Patient had testing done on 10/26/22 (PFT) and has not heard the results. Please call patient and advise.

## 2022-11-14 NOTE — TELEPHONE ENCOUNTER
I think he got the cardiopulmonary exercise testing done at [facility] but I do not see it anywhere in the records  He definitely did not get a PFT    Can you see if you can find this cardiopulmonary exercise test

## 2022-11-14 NOTE — TELEPHONE ENCOUNTER
LM informing patient that we have not received results (they are still pending). I informed him that we will call him with the results when they come in.

## 2022-12-07 ENCOUNTER — OFFICE VISIT (OUTPATIENT)
Dept: PULMONOLOGY | Age: 57
End: 2022-12-07
Payer: MEDICARE

## 2022-12-07 VITALS
WEIGHT: 293 LBS | SYSTOLIC BLOOD PRESSURE: 126 MMHG | HEIGHT: 70 IN | BODY MASS INDEX: 41.95 KG/M2 | RESPIRATION RATE: 16 BRPM | DIASTOLIC BLOOD PRESSURE: 83 MMHG | TEMPERATURE: 97.5 F | OXYGEN SATURATION: 96 % | HEART RATE: 77 BPM

## 2022-12-07 DIAGNOSIS — G47.00 INSOMNIA, UNSPECIFIED TYPE: ICD-10-CM

## 2022-12-07 DIAGNOSIS — I50.9 CHRONIC CONGESTIVE HEART FAILURE, UNSPECIFIED HEART FAILURE TYPE (HCC): ICD-10-CM

## 2022-12-07 DIAGNOSIS — G47.33 OBSTRUCTIVE SLEEP APNEA: Primary | ICD-10-CM

## 2022-12-07 DIAGNOSIS — R06.02 SOB (SHORTNESS OF BREATH): ICD-10-CM

## 2022-12-07 DIAGNOSIS — J32.9 CHRONIC SINUSITIS, UNSPECIFIED LOCATION: ICD-10-CM

## 2022-12-07 DIAGNOSIS — G47.10 HYPERSOMNOLENCE: ICD-10-CM

## 2022-12-07 PROCEDURE — 99214 OFFICE O/P EST MOD 30 MIN: CPT | Performed by: INTERNAL MEDICINE

## 2022-12-07 PROCEDURE — 1036F TOBACCO NON-USER: CPT | Performed by: INTERNAL MEDICINE

## 2022-12-07 PROCEDURE — G8484 FLU IMMUNIZE NO ADMIN: HCPCS | Performed by: INTERNAL MEDICINE

## 2022-12-07 PROCEDURE — G8427 DOCREV CUR MEDS BY ELIG CLIN: HCPCS | Performed by: INTERNAL MEDICINE

## 2022-12-07 PROCEDURE — G8417 CALC BMI ABV UP PARAM F/U: HCPCS | Performed by: INTERNAL MEDICINE

## 2022-12-07 PROCEDURE — 3017F COLORECTAL CA SCREEN DOC REV: CPT | Performed by: INTERNAL MEDICINE

## 2022-12-07 RX ORDER — CLONAZEPAM 0.5 MG/1
1 TABLET ORAL 2 TIMES DAILY
COMMUNITY
Start: 2022-10-28

## 2022-12-07 ASSESSMENT — ENCOUNTER SYMPTOMS
EYES NEGATIVE: 1
GASTROINTESTINAL NEGATIVE: 1
RESPIRATORY NEGATIVE: 1
ALLERGIC/IMMUNOLOGIC NEGATIVE: 1

## 2022-12-07 NOTE — LETTER
1200 [facility] Pulmonary Critical Care and Sleep  2[address] [facility] [address] [address]  Phone: [phone]  Fax: [phone]    Nathalia Minor MD        December 7, 2022     Patient: Ramy Krause   YOB: 1965   Date of Visit: 12/7/2022 12/7/22        Ramy Krause      I have seen this patient in the office today and wanted to communicate my findings and recommendations. Patient Instructions        ASSESSMENT/PLAN:  1. Obstructive sleep apnea  2. Insomnia, unspecified type  3. Hypersomnolence  4. Chronic sinusitis, unspecified location  5. Chronic congestive heart failure, unspecified heart failure type ([address]  6. SOB (shortness of breath)       Obstructive sleep apnea  Advised to avoid driving when too sleepy to function safely and given a discussion of the risks of untreated apnea such as accidents, cognitive impairment, mood impairment, high blood pressure, various cardiac diseases and stroke. Weight loss was encouraged. Sleep study showed ahi of 52  Done as split night study  Done 9/4/18  Wt was 258 lbs  Started on autopap of min of 12 and max of 18    DME is [facility]  Set up date 4/3/21  autopap is set up at min of 14 and max of 18  epr is 3  Standard response  Nasal mask- Wisp    Using 100% of time  Using 10 h/night    90% pressure is 17.2  Leak at 14 lpm  No sleep apnea, ahi is 4.4    I have access via [email or web address]      I will change the max to 19  Call me in 1-2 weeks about the pressure change      Obesity  Weight now at 293   Has been referred to [facility] weight loss   Would suggest following up  Could consider consult with Gastric weight loss surgeon      Sinus  flonase 2 spray/nostril at night, as needed  singulair once a night    12/2/19  FENO was 6 ppb      8/4/18  PFT  TEST DATE:  08/03/2018     TEST PERFORMED:  Spirometry pre and post bronchodilators, lung volumes, diffusion capacity.      FINDINGS:  Effort is reproducible and adequate for interpretation. Expiratory flow rates are normal but both FEV-1 and FVC fall below the normal range. There is a borderline improvement after bronchodilators. Lung volumes showed normal total lung capacity. Diffusion capacity mildly reduced to 71% predicted. Oxygen saturation on 2 liters 98%. IMPRESSION:  Probable combined restrictive and obstructive defect based on bronchodilator response. The overall degree is mild. Diffusion capacity is mildly reduced. Oxygen saturation on 2 liters within normal range. Asthma/COPD  Albuterol (ventolin) as needed  has spacer  Not really helping  singulair     9/14/22  FENO was <5      Tried and failed with  Symbicort  qvar  stiolto  anoro- breathing more labored  Trelegy- caused bp problems  Most inhalers causing issues with breathing        Cardiopulmonary exercise testing done 10/26/2022  CARDIOVASCULAR RESPONSE:    Patient exercised for 6 mins and 55 seconds to stage 7 of the 15 Watt   cycle ergometer protocol. 2.     Patient achieved a peak HR of 120 bpm, which was 75% of maximum   predicated HR. [place] HR was achieved. 3.     Actual METS achieved:2.5   4. Nondiagnostic EKG to assess for myocardial ischemia given paced   rhythm   5.    Ectopy/Arrhythmia during stress: None   EXERCISE PERFORMANCE:    Patient demonstrates severely reduced cardiopulmonary fitness (Peak   VO2/VO2 max: 8.7 ml/kg/min which is 44% of age predicted) and abnormal   aerobic efficiency (VO2/WR slope = 4.2 ml/min/[place]). 2.    Good effort (RER: 1.15)   PULMONARY RESPONSE:   1. There was no clinically significant oxygen desaturations during   exercise. 2. Peak respiratory rate 73 per minute. 3. Normal ventilatory reserve. 4. Spirometry demonstrated normal lung mechanics.      5. Abnormal ventilatory efficiency (Ve/VCO2: 44 at anerobic threshold)   which indicates abnormally increased dead space caused by lung diseases,   pulmonary hypertension, or heart failure. 6.         Patient noted to have oscillatory breathing with exercise which   is seen in patients with severe left ventricular failure   QUALITATIVE COMMENTS:   1. Severely impaired functional capacity (peak VO2 8.7 ml/kg/min, 44%   predicted) with abnormal ventilatory efficiency (VE/VCO2 slope: 44) and   adequate effort. Patient demonstrates abnormal circulatory pattern with   reduced ventilatory anaerobic threshold. 2.          Consider referral to advanced heart failure clinic        Seeing Cardiologist and getting therapy            Sleepiness is better    Stopped all vaping and smoking - > 3 years    Insomnia  On   Klonopin from the Psychiatry        Patient has an ejection fraction of less than 25%, being evaluated by cardiology. Patient is on multiple medications for this. Patient also has elevated creatinine last creatinine, was 5/26/2021 which was 1.66.       Shortness of breath could be multifactorial  Does smoke MJ  Weight  Panic attack      RTC in 3 months                     Thank you for allowing me to assist in the care of the MD Ganesh Garcia MD

## 2022-12-07 NOTE — PROGRESS NOTES
Avelina Mireles (:  1965) is a 59 y.o. male,Established patient, here for evaluation of the following chief complaint(s):  Follow-up and COPD (3 month)         ASSESSMENT/PLAN:  1. Obstructive sleep apnea  2. Insomnia, unspecified type  3. Hypersomnolence  4. Chronic sinusitis, unspecified location  5. Chronic congestive heart failure, unspecified heart failure type ([address]  6. SOB (shortness of breath)       Obstructive sleep apnea  Advised to avoid driving when too sleepy to function safely and given a discussion of the risks of untreated apnea such as accidents, cognitive impairment, mood impairment, high blood pressure, various cardiac diseases and stroke. Weight loss was encouraged. Sleep study showed ahi of 52  Done as split night study  Done 18  Wt was 258 lbs  Started on autopap of min of 12 and max of 18    D[facility] is [facility]  Set up date 4/3/21  autopap is set up at min of 14 and max of 18  epr is 3  Standard response  Nasal mask- Wisp    Using 100% of time  Using 10 h/night    90% pressure is 17.2  Leak at 14 lpm  No sleep apnea, ahi is 4.4    I have access via [employer]      I will change the max to 19  Call me in 1-2 weeks about the pressure change      Obesity  Weight now at 293   Has been referred to [facility] weight loss   Would suggest following up  Could consider consult with Gastric weight loss surgeon      Sinus  flonase 2 spray/nostril at night, as needed  singulair once a night    19  FENO was 6 ppb      18  PFT  TEST DATE:  2018     TEST PERFORMED:  Spirometry pre and post bronchodilators, lung volumes, diffusion capacity. FINDINGS:  Effort is reproducible and adequate for interpretation. Expiratory flow rates are normal but both FEV-1 and FVC fall below the normal range. There is a borderline improvement after bronchodilators. Lung volumes showed normal total lung capacity. Diffusion capacity mildly reduced to 71% predicted.   Oxygen saturation on 2 liters 98%. IMPRESSION:  Probable combined restrictive and obstructive defect based on bronchodilator response. The overall degree is mild. Diffusion capacity is mildly reduced. Oxygen saturation on 2 liters within normal range. Asthma/COPD  Albuterol (ventolin) as needed  has spacer  Not really helping  singulair     9/14/22  FENO was <5      Tried and failed with  Symbicort  qvar  stiolto  anoro- breathing more labored  Trelegy- caused bp problems  Most inhalers causing issues with breathing        Cardiopulmonary exercise testing done 10/26/2022  CARDIOVASCULAR RESPONSE:    Patient exercised for 6 mins and 55 seconds to stage 7 of the 15 Watt   cycle ergometer protocol. 2.     Patient achieved a peak HR of 120 bpm, which was 75% of maximum   predicated HR. Tagert HR was achieved. 3.     Actual METS achieved:2.5   4. Nondiagnostic EKG to assess for myocardial ischemia given paced   rhythm   5.    Ectopy/Arrhythmia during stress: None   EXERCISE PERFORMANCE:    Patient demonstrates severely reduced cardiopulmonary fitness (Peak   VO2/VO2 max: 8.7 ml/kg/min which is 44% of age predicted) and abnormal   aerobic efficiency (VO2/WR slope = 4.2 ml/min/Demarco). 2.    Good effort (RER: 1.15)   PULMONARY RESPONSE:   1. There was no clinically significant oxygen desaturations during   exercise. 2. Peak respiratory rate 73 per minute. 3. Normal ventilatory reserve. 4. Spirometry demonstrated normal lung mechanics. 5. Abnormal ventilatory efficiency (Ve/VCO2: 44 at anerobic threshold)   which indicates abnormally increased dead space caused by lung diseases,   pulmonary hypertension, or heart failure. 6.         Patient noted to have oscillatory breathing with exercise which   is seen in patients with severe left ventricular failure   QUALITATIVE COMMENTS:   1.  Severely impaired functional capacity (peak VO2 8.7 ml/kg/min, 44%   predicted) with abnormal ventilatory efficiency (VE/VCO2 slope: 44) and   adequate effort. Patient demonstrates abnormal circulatory pattern with   reduced ventilatory anaerobic threshold. 2.          Consider referral to advanced heart failure clinic        Seeing Cardiologist and getting therapy            Sleepiness is better    Stopped all vaping and smoking - > 3 years    Insomnia  On   Klonopin from the Psychiatry        Patient has an ejection fraction of less than 25%, being evaluated by cardiology. Patient is on multiple medications for this. Patient also has elevated creatinine last creatinine, was 5/26/2021 which was 1.66. Shortness of breath could be multifactorial  Does smoke MJ  Weight  Panic attack      RTC in 3 months                  No follow-ups on file. I have personally reviewed and summarized the old records       I have independently reviewed the images and reviewed with patient    I have reviewed the lab tests, radiology reports and medications    I have downloaded and interpreted the cpap/bipap/pap data. I have made adjustments as described    Reviewed present meds and side effects. Continue present meds. Stay compliant.  Call if worsens             [address] [facility]  [address] [place] [address]  Dept: [phone]  Loc: [phone]     Diagnosis:  [x] SINDY (ICD-10: G47.33)  o CSA (ICD-10: G47.31)  [] Complex Sleep Apnea (ICD-10: G47.37)  []  (ICD-10: G47.37)  [] Hypoxemia (ICD-10: R09.02)  [] COPD (J44.90)  [] Chronic Respiratory Failure with hypoxemia (J96.11)  [] Chronicr Respiratory Failure with hypercapnia (J96.12)  [] Restrictive Lung Disease (J98.4)      [] New Rx (Device Preference: _________________________)     [x] Change Order   I have done this    [] Replace ___________  [] Clinical assessments and may include IN-Check device, spirometry and ETCO2 PRN    [] Discontinue Order -  [] CPAP    [] BPAP [] PAP    [] Oxygen   /   AMA?  [] Yes   [] No              Therapy    AHI: ________ BENEDICT: ________  LOW SpO2: ________%      DME:    DEVICE / SETTINGS RAMP / COMFORT INTERFACE   []  CPAP () Pressure    Ramp: _________ min's  [] Ramp to patient preference General PAP Supply Kit  Medicaid does not cover heated tubing  ([employer])  PAP Tubing:  [x] Heated ()- 1/3 mos                         [x] Regular () - 1/3 mos  [x] Disposable Water Chamber () - 1/6 mos  [x] Disposable Filter () - 2/mo  [x] Non-Disposable Filter () - 1/6 mos   []  BiLevel PAP ()           IPAP        []  BiLevel PAP with   ()       Backup Rate ()      EPAP Rate  [] Adjust FLEX to patient comfort       SUPPLEMENTAL OXYGEN  [] OXYGEN:      Liter/min: _________  [] Continuous        [] Nocturnal  [] Bleed into PAP Device      [x]  [employer] Press 14                  Max Press [facility] Kit    [x] Mask interface () - 1/3 mos  [x] Nasal Cushion () - 2/mo  [x] Nasal Pillows ()  -2/mo  [x] Headgear () - 1/6 mos   []  AutoBiLevel () Pressure  ()      Support           []  ResMed® IVAPS EPAP  [] Overnight Oximetry on Room Air  [] Overnight Oximetry on PAP Therapy  [] Overnight Oximetry on ___ LPM of oxygen  []  Overnight Oximetry on  PAP therapy with _____ lpm of O2    Target Pt Rate  Min PS      Target Va  Patient Ht  Ti Max                Ti Min        Rise time  Max PS  Trigger  Cycle  Epap  Epap max  Epap min  The patient has a history of:  [] Excessive Daytime Sleepiness  [] Insomnia  [] Impaired Cognition  [] Ischemic Heart Disease  [] Hypertension  [] Mood Disorders          [] History of Stroke  Additional Orders:______________________________________________________________________________________________________________    [] Titrate to comfort  [] Add cloud access via [employer] or Care  Full Face Mask Kit    [] Full face mask () - 1/3 mos  [] Full Face Cushion () - 1/mo  [] Headgear () - 1/6 mos                                           [] Respironics® ASV Advanced ()     EPAP Min  PS Min      EPAP Max  PS Max   Additional Supplies    [] Chin Strap ()  [x] Heated Humidifier Kit ()  Mask Specifications:   [] Patient Preference      -or-            Brand:______________ Size:_______________   Type: __________________________________   [] Mask Refit:___________________________  [] Mask Fitting:  [] Full     [] Nasal                []  Pillows                                Max Press  Ramp Time      Rate  Bi-flex: []1  []2  []3     [] Respironics® AVAPS: ()     IPAP Min  IPAP Max  Pressure Max  Epap max  Epap min  Rise time                      Avaps rate  EPAP   Additional Services    [] Annual PRN service and check of equipment  [] Routine service and check of equipment  [] Download and report compliance data   Tidal volume      Tigger                                     Rate                                         Inspiratory time                                                         The following equipment is Medically Necessary for the above stated patient. It is reasonable and necessary in reference to acceptable standards of medical practice for this condition, and is not prescribed as a convenience. Frequency of Use:    Daily                 Length of Need: 13 Months              o The patient requires BiLevel PAP and the following apply: []  The patient requires a Respiratory Assist Device (RAD) and the following apply:   o CPAP was tried and failed to meet therapeutic goals. [] CPAP was tried, but failed to meet therapeutic goals   o The prescribed mask interface has been properly fit, is the most comfortable to the patient and will be used with the BPAP device.  [] The prescribed mask interface has been properly fit, is the most comfortable to the patient and will be used with the RAD.   o Current CPAP setting prevents patient from tolerating the therapy and lower CPAP settings fail to adequately control the symptoms of SINDY, improve sleep quality, or reduce AHI to acceptable levels. [] Current CPAP setting prevent patient from tolerating the therapy and lower PAP settings fail to  adequately control SINDY symptoms, improve sleep quality, or reduce AHI to acceptable levels. [] There is significant improvement of the respiratory events on the RAD                                                                                                                                                                                                                                  Trey Caban MD               NPI- [id number]     [facility]- [id number]                    12/07/22       ____________________________                        _______________________           Physician Signature                                                         Date                                                     Subjective   SUBJECTIVE/OBJECTIVE:      Seen in hospital 12[address]    52year-old gentleman who was admitted on 08/01 because of waking up in the middle of the morning having an episode of palpitations, shortness of breath, and then all of a sudden feeling \"weird\". The patient has a history of coronary disease, obesity, hypertension, and has been taking medications for many years, about 10 years, came into the ER showing SVT and then finally had to have cardioversion. The patient has been seen by Cardiology. He does report that he snores. He has witnessed apneas, waking up, chocking, gasping, nocturia about three to four x a night, dry mouth, sleepiness, hypersomnolence at night. Neck size is 19 inches and Ramer is about 12 out of 24. The patient denies any RLS symptoms. Sleepy during the daytime.      He does have a history of smoking, has complained of shortness of breath, no chest pain, has complained of shortness of breath, no chest pain, some wheezing, has more shortness of breath since coming and having the cardioversion. PAST MEDICAL HISTORY:  1. Hypertension. 2.  Antidepressive disorder. 3. Coronary artery disease. 4. Anxiety. 5. Acute malfunction in the past.     PAST SURGICAL HISTORY: Cardiac catheterization. FAMILY HISTORY:  Positive for diabetes, stroke, heart disease, alcohol abuse in the family. SOCIAL HISTORY:  Current smoker of about half pack and had been a heavy smoker in the past. Positive 30+ pack years. Alcohol use, 5 to 6 beers about once a month, and some occasional marijuana use. ALLERGIES:  Are aspirin and Crestor. Discussion /Plan:        1. PFT c/w asthma most likely  2. Would plan on d/c with albuterol with spacer as needed  3. Stop smoking  4. Will set up with outpatient sleep study  5. Will do split night  6. Will get back in the office after the study  7. Order in computer  8.  Can d/c when ready     Jeevan Thomas MD     [facility]  [phone] [place] [address])    Since having cpap    Using humidity  And having some sinus    Having congestion in the sinus    breathign has been doing well    Recently feeling like he is suffocating  Feels like he is having water back in his lungs  \\    Going to bed at 11 pm and waking up 830 am  No snoring  No bloating  No burping    No chest pain  No ear popping    No rls  No plms    Pressure feels good    Having issues with the machine  Having sinus issues  And some pressure issues  No chest pain      More issues with the sinus  May     Feels like somehting in chest      Now with pacemaker and defib    Some breathing issues since getting exposed to mold    Having issues with the cpap    Has tried symbicort and qvar  Ventolin    In the center of the chest has some breathing/\"suffocating\" sensation  But the oxygen levels are good    Having more issues with fatigue and some insomnia  Has tried restoril in the past and seemed to help                 Patient was last seen by Dr. Lieutenant Hobson on 6/28/2021    1. Ischemic cardiomyopathy:   His LVEF is still severely reduced although modestly improved to 25%. His blood pressure is elevated although he has some dizziness interestingly enough. Continue on low dose Entresto. Discontinued his enalapril. Continue his beta-blocker. 2. Hypertension:   Continue Toprol somewhat 100 mg per day, which is a higher dose. 3. LBBB:   He has some fatigue nearing the end of the day, which may be multifactorial from his obesity as well as COPD and his cardiomyopathy. We will discuss with EP whether a CRT device would be beneficial or not. Having more issues with the heat  Using albuterol more    Snoring  And waking more at night  Sleepiness is getting worse        Had one episode of defib    Was taken off restoril  Now on klonopin from Psych      Albuterol helps at times        Since getting the new machine  No snoring  No bloating  No burping  No ear popping  No dry mouth  No chest pains    Tolerating pressures well  No RLS  No PLM's    Going to bed at 12 -6 am and waking up at  8 am- 2-3 pm        Since last visit  Albuterol as needed  Now seeing cardiology, from [facility]    Overall no issues  Some gain of [facility]        Review of Systems   Constitutional: Negative. HENT: Negative. Eyes: Negative. Respiratory: Negative. Cardiovascular: Negative. Gastrointestinal: Negative. Endocrine: Negative. Genitourinary: Negative. Musculoskeletal: Negative. Skin: Negative. Allergic/Immunologic: Negative. Neurological: Negative. Hematological: Negative. Psychiatric/Behavioral: Negative.         Vitals:    12/07/22 1012   BP: 126/83   Site: Left Upper Arm   Position: Sitting   Cuff Size: Medium Adult   Pulse: 77   Resp: 16   Temp: 97.5 °F (36.4 °C)   TempSrc: Temporal   SpO2: 96%   Weight: 293 lb (132.9 kg)   Height: 5' 10\" (1.778 m)       Objective   Physical Exam  Vitals and nursing note reviewed. Constitutional:       General: He is not in acute distress. Appearance: Normal appearance. He is not ill-appearing. HENT:      Head: Normocephalic and atraumatic. Right Ear: External ear normal.      Left Ear: External ear normal.      Nose: Nose normal.      Mouth/Throat:      Mouth: Mucous membranes are moist.      Pharynx: Oropharynx is clear. Comments: Mallampati 3  Eyes:      General: No scleral icterus. Extraocular Movements: Extraocular movements intact. Conjunctiva/sclera: Conjunctivae normal.      Pupils: Pupils are equal, round, and reactive to light. Cardiovascular:      Rate and Rhythm: Normal rate and regular rhythm. Pulses: Normal pulses. Heart sounds: Normal heart sounds. No murmur heard. No friction rub. Pulmonary:      Effort: No respiratory distress. Breath sounds: No stridor. No wheezing or rhonchi. Comments: Dec bs b/l  Abdominal:      General: Abdomen is flat. Bowel sounds are normal. There is no distension. Tenderness: There is no abdominal tenderness. There is no guarding. Musculoskeletal:         General: No swelling or tenderness. Normal range of motion. Cervical back: Normal range of motion and neck supple. No rigidity. Skin:     General: Skin is warm and dry. Coloration: Skin is not jaundiced. Neurological:      General: No focal deficit present. Mental Status: He is alert and oriented to person, place, and time. Mental status is at baseline. Cranial Nerves: No cranial nerve deficit. Sensory: No sensory deficit. Motor: No weakness. Gait: Gait normal.   Psychiatric:         Mood and Affect: Mood normal.         Thought Content:  Thought content normal.         Judgment: Judgment normal.          On this date 8/6/2021 I have spent 25 minutes reviewing previous notes, test results and face to face with the patient discussing the diagnosis and importance of compliance with the treatment plan as well as documenting on the day of the visit.       An electronic signature was used to authenticate this note.    --Gill Monson MD

## 2022-12-07 NOTE — PATIENT INSTRUCTIONS
ASSESSMENT/PLAN:  1. Obstructive sleep apnea  2. Insomnia, unspecified type  3. Hypersomnolence  4. Chronic sinusitis, unspecified location  5. Chronic congestive heart failure, unspecified heart failure type ([address]  6. SOB (shortness of breath)       Obstructive sleep apnea  Advised to avoid driving when too sleepy to function safely and given a discussion of the risks of untreated apnea such as accidents, cognitive impairment, mood impairment, high blood pressure, various cardiac diseases and stroke. Weight loss was encouraged. Sleep study showed ahi of 52  Done as split night study  Done 9/4/18  Wt was 258 lbs  Started on autopap of min of 12 and max of 18    D[facility] is [facility]  Set up date 4/3/21  autopap is set up at min of 14 and max of 18  epr is 3  Standard response  Nasal mask- Wisp    Using 100% of time  Using 10 h/night    90% pressure is 17.2  Leak at 14 lpm  No sleep apnea, ahi is 4.4    I have access via [employer]      I will change the max to 19  Call me in 1-2 weeks about the pressure change      Obesity  Weight now at 293   Has been referred to [facility] weight loss   Would suggest following up  Could consider consult with Gastric weight loss surgeon      Sinus  flonase 2 spray/nostril at night, as needed  singulair once a night    12/2/19  FENO was 6 ppb      8/4/18  PFT  TEST DATE:  08/03/2018     TEST PERFORMED:  Spirometry pre and post bronchodilators, lung volumes, diffusion capacity. FINDINGS:  Effort is reproducible and adequate for interpretation. Expiratory flow rates are normal but both FEV-1 and FVC fall below the normal range. There is a borderline improvement after bronchodilators. Lung volumes showed normal total lung capacity. Diffusion capacity mildly reduced to 71% predicted. Oxygen saturation on 2 liters 98%. IMPRESSION:  Probable combined restrictive and obstructive defect based on bronchodilator response. The overall degree is mild.   Diffusion capacity is mildly reduced. Oxygen saturation on 2 liters within normal range. Asthma/COPD  Albuterol (ventolin) as needed  has spacer  Not really helping  singulair     9/14/22  FENO was <5      Tried and failed with  Symbicort  qvar  stiolto  anoro- breathing more labored  Trelegy- caused bp problems  Most inhalers causing issues with breathing        Cardiopulmonary exercise testing done 10/26/2022  CARDIOVASCULAR RESPONSE:    Patient exercised for 6 mins and 55 seconds to stage 7 of the 15 Watt   cycle ergometer protocol. 2.     Patient achieved a peak HR of 120 bpm, which was 75% of maximum   predicated HR. Tagert HR was achieved. 3.     Actual METS achieved:2.5   4. Nondiagnostic EKG to assess for myocardial ischemia given paced   rhythm   5.    Ectopy/Arrhythmia during stress: None   EXERCISE PERFORMANCE:    Patient demonstrates severely reduced cardiopulmonary fitness (Peak   VO2/VO2 max: 8.7 ml/kg/min which is 44% of age predicted) and abnormal   aerobic efficiency (VO2/WR slope = 4.2 ml/min/Demarco). 2.    Good effort (RER: 1.15)   PULMONARY RESPONSE:   1. There was no clinically significant oxygen desaturations during   exercise. 2. Peak respiratory rate 73 per minute. 3. Normal ventilatory reserve. 4. Spirometry demonstrated normal lung mechanics. 5. Abnormal ventilatory efficiency (Ve/VCO2: 44 at anerobic threshold)   which indicates abnormally increased dead space caused by lung diseases,   pulmonary hypertension, or heart failure. 6.         Patient noted to have oscillatory breathing with exercise which   is seen in patients with severe left ventricular failure   QUALITATIVE COMMENTS:   1. Severely impaired functional capacity (peak VO2 8.7 ml/kg/min, 44%   predicted) with abnormal ventilatory efficiency (VE/VCO2 slope: 44) and   adequate effort. Patient demonstrates abnormal circulatory pattern with   reduced ventilatory anaerobic threshold.    2. Consider referral to advanced heart failure clinic        Seeing Cardiologist and getting therapy            Sleepiness is better    Stopped all vaping and smoking - > 3 years    Insomnia  On   Klonopin from the Psychiatry        Patient has an ejection fraction of less than 25%, being evaluated by cardiology. Patient is on multiple medications for this. Patient also has elevated creatinine last creatinine, was 5/26/2021 which was 1.66. Shortness of breath could be multifactorial  Does smoke MJ  Weight  Panic attack      RTC in 3 months    Remember to bring a list of pulmonary medications and any CPAP or BiPAP machines to your next appointment with the office. Please keep all of your future appointments scheduled by [address], [place] [facility] office. Out of respect for other patients and providers, you may be asked to reschedule your appointment if you arrive later than your scheduled appointment time. Appointments cancelled less than 24hrs in advance will be considered a no show. Patients with three missed appointments within 1 year or four missed appointments within 2 years can be dismissed from the practice. Please be aware that our physicians are required to work in the Intensive Care Unit at [facility].  Your appointment may need to be rescheduled if they are designated to work during your appointment time. You may receive a survey regarding the care you received during your visit. Your input is valuable to us. We encourage you to complete and return your survey. We hope you will choose us in the future for your healthcare needs. Pt instructed of all future appointment dates & times, including radiology, labs, procedures & referrals. If procedures were scheduled preparation instructions provided. Instructions on future appointments with [facility] were given.

## 2022-12-07 NOTE — PROGRESS NOTES
MA Communication:   The following orders are received by verbal communication from Kta Davies MD    Orders include:  Order sent to [facility]       3 mo fu scheduled 3/22/23

## 2022-12-20 ENCOUNTER — TELEPHONE (OUTPATIENT)
Dept: PULMONOLOGY | Age: 57
End: 2022-12-20

## 2022-12-20 NOTE — TELEPHONE ENCOUNTER
Patient is wanting a letter stating he has breathing issues, his apartment building is wanting to spray for bugs but with his issues he states he can't take it. Please Advise.  Thanks

## 2022-12-20 NOTE — LETTER
[id number] [facility]  [address] [place] [address]  Phone: [phone]  Fax: [phone]    Bebeto Braswell MD        December 20, 2022     Patient: Stan Alvarado   YOB: 1965   Date of Visit: 12/20/2022       To Whom It May Concern:    Mr. Rosi Moore is a patient of mine with severe lung disease. Patient has issues with smells and medications that are aerosolized that caused problems with his breathing. I would advise that I would not to spray around the patient as he may have trouble breathing if exposed to volatile aerosol.       Sincerely,        Bebeto Braswell MD

## 2023-01-12 RX ORDER — FLUTICASONE PROPIONATE 50 MCG
SPRAY, SUSPENSION (ML) NASAL
Qty: 48 G | Refills: 5 | Status: SHIPPED | OUTPATIENT
Start: 2023-01-12

## 2023-02-27 RX ORDER — MONTELUKAST SODIUM 10 MG/1
TABLET ORAL
Qty: 90 TABLET | Refills: 1 | Status: SHIPPED | OUTPATIENT
Start: 2023-02-27 | End: 2023-03-22 | Stop reason: SDUPTHER

## 2023-03-21 ENCOUNTER — TELEPHONE (OUTPATIENT)
Dept: PULMONOLOGY | Age: 58
End: 2023-03-21

## 2023-03-22 RX ORDER — MONTELUKAST SODIUM 10 MG/1
TABLET ORAL
Qty: 90 TABLET | Refills: 3 | Status: SHIPPED | OUTPATIENT
Start: 2023-03-22

## 2023-03-22 RX ORDER — ALBUTEROL SULFATE 90 UG/1
AEROSOL, METERED RESPIRATORY (INHALATION)
Qty: 3 EACH | Refills: 5 | Status: SHIPPED | OUTPATIENT
Start: 2023-03-22

## 2023-03-22 RX ORDER — FLUTICASONE PROPIONATE 50 MCG
SPRAY, SUSPENSION (ML) NASAL
Qty: 3 EACH | Refills: 5 | Status: SHIPPED | OUTPATIENT
Start: 2023-03-22

## 2023-03-24 NOTE — TELEPHONE ENCOUNTER
Called to confirm 3/22 appt. Patient refused to see Rosmery Weber 3/22 only wants to see Rio Bauer rescheduled 6/15 10:15 with Rio Bauer. PT stated he needs refills. I advised an appt may be needed for refills.  Requesting an MA to call him about refill request.
LM asking patient to return call to office.
Please review previous message   Patient is not scheduled until June with our office and states he needs refills to last him till June when he see Dr. Mar Hatchet  Albuterol Sulfate   To be sent to [facility]       Patient also stated he is going to change medical supply company and will need CPAP supplies faxed  I informed the patient he will need to find which location he is changing to   Patient understands he will have to call our office back and let us know which DME  he is changing too
Overnight Oximetry on Room Air  [] Overnight Oximetry on PAP Therapy  [] Overnight Oximetry on ___ LPM of oxygen  []  Overnight Oximetry on  PAP therapy with _____ lpm of O2    Target Pt Rate  Min PS      Target Va  Patient Ht  Ti Max                Ti Min        Rise time  Max PS  Trigger  Cycle  Epap  Epap max  Epap min  The patient has a history of:  [] Excessive Daytime Sleepiness  [] Insomnia  [] Impaired Cognition  [] Ischemic Heart Disease  [] Hypertension  [] Mood Disorders          [] History of Stroke  Additional Orders:______________________________________________________________________________________________________________    [] Titrate to comfort  [] Add cloud access via [employer] [facility]) or Care  Full Face Mask Kit    [] Full face mask () - 1/3 mos  [] Full Face Cushion () - 1/mo  [] Headgear () - 1/6 mos                                           [] Respironics® ASV Advanced ()     EPAP Min  PS Min      EPAP Max  PS Max   Additional Supplies    [] Chin Strap ()  [x] Heated Humidifier Kit ()  Mask Specifications:   [] Patient Preference      -or-            Brand:______________ Size:_______________   Type: __________________________________   [] Mask Refit:___________________________  [] Mask Fitting:  [] Full     [] Nasal                []  Pillows                                Max Press  Ramp Time      Rate  Bi-flex: []1  []2  []3     [] Respironics® AVAPS: ()     IPAP Min  IPAP Max  Pressure Max  Epap max  Epap min  Rise time                      Avaps rate  EPAP   Additional Services    [] Annual PRN service and check of equipment  [] Routine service and check of equipment  [] Download and report compliance data   Tidal volume      Tigger                                     Rate                                         Inspiratory time                                                         The following equipment is Medically

## 2023-06-20 ENCOUNTER — OFFICE VISIT (OUTPATIENT)
Dept: PULMONOLOGY | Age: 58
End: 2023-06-20
Payer: MEDICARE

## 2023-06-20 VITALS
HEIGHT: 70 IN | SYSTOLIC BLOOD PRESSURE: 125 MMHG | DIASTOLIC BLOOD PRESSURE: 81 MMHG | RESPIRATION RATE: 16 BRPM | HEART RATE: 85 BPM | TEMPERATURE: 98.6 F | OXYGEN SATURATION: 98 % | WEIGHT: 277.4 LBS | BODY MASS INDEX: 39.71 KG/M2

## 2023-06-20 DIAGNOSIS — J32.9 CHRONIC SINUSITIS, UNSPECIFIED LOCATION: ICD-10-CM

## 2023-06-20 DIAGNOSIS — R06.02 SOB (SHORTNESS OF BREATH): ICD-10-CM

## 2023-06-20 DIAGNOSIS — E66.9 CLASS 2 OBESITY WITHOUT SERIOUS COMORBIDITY WITH BODY MASS INDEX (BMI) OF 38.0 TO 38.9 IN ADULT, UNSPECIFIED OBESITY TYPE: ICD-10-CM

## 2023-06-20 DIAGNOSIS — G47.33 OBSTRUCTIVE SLEEP APNEA: Primary | ICD-10-CM

## 2023-06-20 DIAGNOSIS — J44.9 CHRONIC OBSTRUCTIVE PULMONARY DISEASE, UNSPECIFIED COPD TYPE (HCC): ICD-10-CM

## 2023-06-20 DIAGNOSIS — I50.9 CHRONIC CONGESTIVE HEART FAILURE, UNSPECIFIED HEART FAILURE TYPE (HCC): ICD-10-CM

## 2023-06-20 DIAGNOSIS — G47.00 INSOMNIA, UNSPECIFIED TYPE: ICD-10-CM

## 2023-06-20 PROCEDURE — 3023F SPIROM DOC REV: CPT | Performed by: INTERNAL MEDICINE

## 2023-06-20 PROCEDURE — 3017F COLORECTAL CA SCREEN DOC REV: CPT | Performed by: INTERNAL MEDICINE

## 2023-06-20 PROCEDURE — G8427 DOCREV CUR MEDS BY ELIG CLIN: HCPCS | Performed by: INTERNAL MEDICINE

## 2023-06-20 PROCEDURE — 1036F TOBACCO NON-USER: CPT | Performed by: INTERNAL MEDICINE

## 2023-06-20 PROCEDURE — G8417 CALC BMI ABV UP PARAM F/U: HCPCS | Performed by: INTERNAL MEDICINE

## 2023-06-20 PROCEDURE — 99214 OFFICE O/P EST MOD 30 MIN: CPT | Performed by: INTERNAL MEDICINE

## 2023-06-20 RX ORDER — SEMAGLUTIDE 1.34 MG/ML
INJECTION, SOLUTION SUBCUTANEOUS
COMMUNITY
Start: 2023-05-23

## 2023-06-20 RX ORDER — SEMAGLUTIDE 1.34 MG/ML
INJECTION, SOLUTION SUBCUTANEOUS
COMMUNITY
Start: 2023-03-13 | End: 2023-06-20

## 2023-06-20 RX ORDER — AZELASTINE 1 MG/ML
2 SPRAY, METERED NASAL 2 TIMES DAILY
Qty: 120 ML | Refills: 1 | Status: SHIPPED | OUTPATIENT
Start: 2023-06-20

## 2023-06-20 RX ORDER — EMPAGLIFLOZIN 10 MG/1
TABLET, FILM COATED ORAL
COMMUNITY
Start: 2023-04-15

## 2023-06-20 ASSESSMENT — ENCOUNTER SYMPTOMS
GASTROINTESTINAL NEGATIVE: 1
EYES NEGATIVE: 1
RESPIRATORY NEGATIVE: 1
ALLERGIC/IMMUNOLOGIC NEGATIVE: 1

## 2023-06-20 NOTE — PATIENT INSTRUCTIONS
ASSESSMENT/PLAN:  1. Obstructive sleep apnea  2. Insomnia, unspecified type  3. SOB (shortness of breath)  4. Chronic obstructive pulmonary disease, unspecified COPD type ([facility] [address]  5. Chronic congestive heart failure, unspecified heart failure type ([facility] [facility]  6. Chronic sinusitis, unspecified location  7. Class 2 obesity without serious comorbidity with body mass index (BMI) of 38.0 to 38.9 in adult, unspecified obesity type       Obstructive sleep apnea  Advised to avoid driving when too sleepy to function safely and given a discussion of the risks of untreated apnea such as accidents, cognitive impairment, mood impairment, high blood pressure, various cardiac diseases and stroke. Weight loss was encouraged. Sleep study showed ahi of 52  Done as split night study  Done 9/4/18  Wt was 258 lbs  Started on autopap of min of 12 and max of 18    DME is [facility]  Set up date 4/3/21  autopap is set up at min of 14 and max of 19  epr is 3  Standard response  Nasal mask- Wisp    Using 100% of time  Using 9 h/night    90% pressure is 18.3  Leak at 14 lpm  Now with sleep apnea, ahi is 19.6      Now with Cheyne-Mata respiration at 16%    Will have cardiac procedure on July 11,needs to resolve cardiac issues    I have access via [employer]      Will change the max to 20  Call me in 1-2 weeks about pressure change      Obesity  Weight now at 293   Has been referred to [facility] weight loss   Would suggest following up  Could consider consult with Gastric weight loss surgeon      Sinus  flonase 2 spray/nostril at night, as needed  singulair once a night    Will add  astelin        Asthma/COPD    12/2/19  FENO was 6 ppb      8/4/18  PFT  TEST DATE:  08/03/2018     TEST PERFORMED:  Spirometry pre and post bronchodilators, lung volumes, diffusion capacity. FINDINGS:  Effort is reproducible and adequate for interpretation. Expiratory flow rates are normal but both FEV-1 and FVC fall below the normal range.   There is a

## 2023-06-20 NOTE — PROGRESS NOTES
MA Communication:   The following orders are received by verbal communication from Prasanna Nicholson MD    Orders include:  3 month f/u SINDY/Pulm
some pressure issues  No chest pain      More issues with the sinus  May     Feels like somehting in chest      Now with pacemaker and defib    Some breathing issues since getting exposed to mold    Having issues with the cpap    Has tried symbicort and qvar  Ventolin    In the center of the chest has some breathing/\"suffocating\" sensation  But the oxygen levels are good    Having more issues with fatigue and some insomnia  Has tried restoril in the past and seemed to help                 Patient was last seen by Dr. Jermaine Vizcaino on 6/28/2021    1. Ischemic cardiomyopathy:   His LVEF is still severely reduced although modestly improved to 25%. His blood pressure is elevated although he has some dizziness interestingly enough. Continue on low dose Entresto. Discontinued his enalapril. Continue his beta-blocker. 2. Hypertension:   Continue Toprol somewhat 100 mg per day, which is a higher dose. 3. LBBB:   He has some fatigue nearing the end of the day, which may be multifactorial from his obesity as well as COPD and his cardiomyopathy. We will discuss with EP whether a CRT device would be beneficial or not. Having more issues with the heat  Using albuterol more    Snoring  And waking more at night  Sleepiness is getting worse        Had one episode of defib    Was taken off restoril  Now on klonopin from Psych      Albuterol helps at times        Since getting the new machine  No snoring  No bloating  No burping  No ear popping  No dry mouth  No chest pains    Tolerating pressures well  No RLS  No PLM's    Going to bed at 12 -6 am and waking up at  8 am- 2-3 pm        Since last visit  Albuterol as needed  Now seeing cardiology, from CHF    Overall no issues  Some gain of wieght        Review of Systems   Constitutional: Negative. HENT: Negative. Eyes: Negative. Respiratory: Negative. Cardiovascular: Negative. Gastrointestinal: Negative. Endocrine: Negative.     Genitourinary:

## 2023-06-21 ENCOUNTER — TELEPHONE (OUTPATIENT)
Dept: PULMONOLOGY | Age: 58
End: 2023-06-21

## 2023-06-21 DIAGNOSIS — G47.00 INSOMNIA, UNSPECIFIED TYPE: Primary | ICD-10-CM

## 2023-06-21 NOTE — TELEPHONE ENCOUNTER
Pt. Call Daisy Mauro stopped Klonopin today and does not have a problem with him taking Memory Handy. He was wondering if you would prescribe it.

## 2023-06-22 RX ORDER — DARIDOREXANT 50 MG/1
50 TABLET, FILM COATED ORAL
Qty: 30 TABLET | Refills: 1 | Status: SHIPPED | OUTPATIENT
Start: 2023-06-22

## 2023-06-22 NOTE — TELEPHONE ENCOUNTER
Updated Pt   He states that if they are going to mail it to him it needs to go to:  [address] [facility] [address]

## 2023-06-22 NOTE — TELEPHONE ENCOUNTER
Pt's Psych stopped his klonopin and doesn't have an issue with him taking Tatyana Plunk. Pt wants to know if you will prescribe it.     Per last Office Note:  Insomnia  On   Klonopin from the Psychiatry     Could consider  Reggie Apgar- however not for anxiety

## 2023-06-22 NOTE — TELEPHONE ENCOUNTER
I have written for the [facility] [employer]ing and will send it to the [address] [place] [address] pharmacy to see if it will be filled

## 2023-07-17 ENCOUNTER — TELEPHONE (OUTPATIENT)
Dept: PULMONOLOGY | Age: 58
End: 2023-07-17

## 2023-07-17 DIAGNOSIS — G47.33 OBSTRUCTIVE SLEEP APNEA: Primary | ICD-10-CM

## 2023-07-17 NOTE — TELEPHONE ENCOUNTER
I changed settings remotely. Pt. Advised and instructed to call next week with an update. Voiced understanding.

## 2023-07-17 NOTE — TELEPHONE ENCOUNTER
CR report reviewed AHI worse and now with18% Cheyne-Mata. Will change back to Min 14, max 19. Please send order to DME. He is to call next week with update.

## 2024-01-10 ENCOUNTER — OFFICE VISIT (OUTPATIENT)
Dept: PULMONOLOGY | Age: 59
End: 2024-01-10
Payer: MEDICARE

## 2024-01-10 VITALS
HEIGHT: 70 IN | WEIGHT: 279 LBS | HEART RATE: 69 BPM | DIASTOLIC BLOOD PRESSURE: 88 MMHG | BODY MASS INDEX: 39.94 KG/M2 | TEMPERATURE: 97.3 F | SYSTOLIC BLOOD PRESSURE: 133 MMHG | RESPIRATION RATE: 16 BRPM | OXYGEN SATURATION: 94 %

## 2024-01-10 DIAGNOSIS — G47.33 OBSTRUCTIVE SLEEP APNEA: Primary | ICD-10-CM

## 2024-01-10 DIAGNOSIS — G47.00 INSOMNIA, UNSPECIFIED TYPE: ICD-10-CM

## 2024-01-10 DIAGNOSIS — J44.9 CHRONIC OBSTRUCTIVE PULMONARY DISEASE, UNSPECIFIED COPD TYPE (HCC): ICD-10-CM

## 2024-01-10 DIAGNOSIS — R06.02 SOB (SHORTNESS OF BREATH): ICD-10-CM

## 2024-01-10 PROCEDURE — G8417 CALC BMI ABV UP PARAM F/U: HCPCS | Performed by: INTERNAL MEDICINE

## 2024-01-10 PROCEDURE — G8484 FLU IMMUNIZE NO ADMIN: HCPCS | Performed by: INTERNAL MEDICINE

## 2024-01-10 PROCEDURE — 3017F COLORECTAL CA SCREEN DOC REV: CPT | Performed by: INTERNAL MEDICINE

## 2024-01-10 PROCEDURE — G8427 DOCREV CUR MEDS BY ELIG CLIN: HCPCS | Performed by: INTERNAL MEDICINE

## 2024-01-10 PROCEDURE — 1036F TOBACCO NON-USER: CPT | Performed by: INTERNAL MEDICINE

## 2024-01-10 PROCEDURE — 99214 OFFICE O/P EST MOD 30 MIN: CPT | Performed by: INTERNAL MEDICINE

## 2024-01-10 PROCEDURE — 3023F SPIROM DOC REV: CPT | Performed by: INTERNAL MEDICINE

## 2024-01-10 NOTE — PATIENT INSTRUCTIONS
ASSESSMENT/PLAN:  1. Obstructive sleep apnea  2. Insomnia, unspecified type  3. SOB (shortness of breath)  4. Chronic obstructive pulmonary disease, unspecified COPD type (HCC)       Obstructive sleep apnea  Advised to avoid driving when too sleepy to function safely and given a discussion of the risks of untreated apnea such as accidents, cognitive impairment, mood impairment, high blood pressure, various cardiac diseases and stroke. Weight loss was encouraged.    Sleep study showed ahi of 52  Done as split night study  Done 9/4/18  Wt was 258 lbs  Started on autopap of min of 12 and max of 18    DME is [facility]  Set up date 4/3/21  autopap is set up at min of 14 and max of 19  epr is 3  Standard response  Nasal mask- Wisp    Using 100% of time  Using 9.5 h/night    90% pressure is 17.7  Leak at 22 lpm  No sleep apnea, ahi is 3.3      Now with Cheyne-Mata respiration at 0%  This is improved      I have access via [employer]    Will albert nunez to get supplies  [facility]      Obesity  Weight now at 297 from 293   Has been referred to [facility] weight loss   Would suggest following up  Could consider consult with Gastric weight loss surgeon      Sinus  flonase 2 spray/nostril at night, as needed  singulair once a night  astelin        Asthma/COPD    12/2/19  FENO was 6 ppb      8/4/18  PFT  TEST DATE:  08/03/2018     TEST PERFORMED:  Spirometry pre and post bronchodilators, lung volumes, diffusion capacity.     FINDINGS:  Effort is reproducible and adequate for interpretation.  Expiratory flow rates are normal but both FEV-1 and FVC fall below the normal range.  There is a borderline improvement after bronchodilators.  Lung volumes showed normal total lung capacity.  Diffusion capacity mildly reduced to 71% predicted.  Oxygen saturation on 2 liters 98%.     IMPRESSION:  Probable combined restrictive and obstructive defect based on bronchodilator response.  The overall degree is mild.  Diffusion

## 2024-01-10 NOTE — PROGRESS NOTES
MA Communication:  The following orders are received by verbal communication from Seb Landers MD    Orders include:    6 month    
efficiency (VE/VCO2 slope: 44) and   adequate effort. Patient demonstrates abnormal circulatory pattern with   reduced ventilatory anaerobic threshold.   2.          Consider referral to advanced heart failure clinic          Patient has an ejection fraction of less than 25%, being evaluated by cardiology.  Patient is on multiple medications for this.  Patient also has elevated creatinine last creatinine, was 2021 which was 1.66.    Seeing Cardiologist and getting therapy  Seeing [facility]            Stopped all vaping and smoking - > 3 years    Insomnia  On   Klonopin from the Psychiatry    Could consider  Quiviviq- however not for anxiety    Ok to go back to   Restoril if ok with Psychiatry            Shortness of breath could be multifactorial  Does smoke MJ  Weight  Panic attack        RTC in 6 months                  No follow-ups on file.       I have personally reviewed and summarized the old records       I have independently reviewed the images and reviewed with patient    I have reviewed the lab tests, radiology reports and medications    I have downloaded and interpreted the cpap/bipap/pap data. I have made adjustments as described    Reviewed present meds and side effects. Continue present meds.  Stay compliant. Call if worsens                       [facility] PHYSICIANS [place] SPECIALTY CARE [facility] PULMONARY CRITICAL CARE AND SLEEP  [address]  [address]  [facility] [address]  Dept: [phone]    Name of Patient:  Dinesh Partida  :  1965  Diagnosis:  [x] SINDY (ICD-10: G47.33)  o CSA (ICD-10: G47.31)  [] Complex Sleep Apnea (ICD-10: G47.37)  []  (ICD-10: G47.37)  [] Hypoxemia (ICD-10: R09.02)  [] COPD (J44.90)  [] Chronic Respiratory Failure with hypoxemia (J96.11)  [] Chronicr Respiratory Failure with hypercapnia (J96.12)  [] Restrictive Lung Disease (J98.4)    [] New Rx (Device Preference: _________________________)     [] Change Order     [] I

## 2024-01-29 ENCOUNTER — TELEPHONE (OUTPATIENT)
Dept: PULMONOLOGY | Age: 59
End: 2024-01-29

## 2024-01-31 NOTE — TELEPHONE ENCOUNTER
Patient called and is still wondering about getting pap supplies from [facility]. There is an order for supplies and all he has to do is call [facility], [phone].   Compliance report was sent to [facility].   Number given for [facility].   Best contact info for patient currently is [phone]

## 2024-02-14 RX ORDER — MONTELUKAST SODIUM 10 MG/1
TABLET ORAL
Qty: 90 TABLET | Refills: 1 | Status: SHIPPED | OUTPATIENT
Start: 2024-02-14

## 2024-02-14 NOTE — TELEPHONE ENCOUNTER
Last office visit 1/10/2024     Last written 3/22/23     Next office visit scheduled 7/11/2024    Requested Prescriptions     Pending Prescriptions Disp Refills    montelukast (SINGULAIR) 10 MG tablet [Pharmacy Med Name: MONTELUKAST SODIUM 10 MG Tablet] 90 tablet 3     Sig: TAKE 1 TABLET EVERY NIGHT

## 2024-05-17 RX ORDER — ALBUTEROL SULFATE 90 UG/1
AEROSOL, METERED RESPIRATORY (INHALATION)
Qty: 3 EACH | Refills: 1 | Status: SHIPPED | OUTPATIENT
Start: 2024-05-17

## 2024-05-17 NOTE — TELEPHONE ENCOUNTER
Pharmacy requesting refill for albuterol. No upcomming appt. Last OV 1/10/24. Not sure if he will be following Dr. Landers. Order pended with 1 refill if appropriate.

## 2024-05-23 RX ORDER — FLUTICASONE PROPIONATE 50 MCG
SPRAY, SUSPENSION (ML) NASAL
Qty: 48 G | Refills: 3 | OUTPATIENT
Start: 2024-05-23

## 2024-07-10 DIAGNOSIS — J32.9 CHRONIC SINUSITIS, UNSPECIFIED LOCATION: Primary | ICD-10-CM

## 2024-07-10 RX ORDER — MONTELUKAST SODIUM 10 MG/1
TABLET ORAL
Qty: 90 TABLET | Refills: 0 | Status: SHIPPED | OUTPATIENT
Start: 2024-07-10

## 2024-07-10 NOTE — TELEPHONE ENCOUNTER
LM asking patient to return call to office to find out if he plans to establish with another provider here or if he is following Dr. Landers to his new location.

## 2024-07-10 NOTE — TELEPHONE ENCOUNTER
90 day supply of Singulair sent. Will need to establish with provider or document following Dr. Landers. Please call to schedule.

## 2024-07-10 NOTE — TELEPHONE ENCOUNTER
Last office visit 1/10/2024     Last written 2/14/24    Next office visit scheduled Visit date not found    Requested Prescriptions     Pending Prescriptions Disp Refills    montelukast (SINGULAIR) 10 MG tablet [Pharmacy Med Name: MONTELUKAST SODIUM 10 MG Tablet] 90 tablet 3     Sig: TAKE 1 TABLET EVERY NIGHT           Med pended

## 2024-07-26 RX ORDER — AZELASTINE 1 MG/ML
SPRAY, METERED NASAL
Qty: 120 ML | Refills: 0 | Status: SHIPPED | OUTPATIENT
Start: 2024-07-26

## 2024-09-20 DIAGNOSIS — J32.9 CHRONIC SINUSITIS, UNSPECIFIED LOCATION: ICD-10-CM

## 2024-09-20 RX ORDER — MONTELUKAST SODIUM 10 MG/1
TABLET ORAL
Qty: 90 TABLET | Refills: 3 | OUTPATIENT
Start: 2024-09-20

## 2024-09-20 NOTE — TELEPHONE ENCOUNTER
Last office visit 1/10/2024     Last written 7/10/24    Next office visit scheduled Visit date not found    Requested Prescriptions     Pending Prescriptions Disp Refills    montelukast (SINGULAIR) 10 MG tablet [Pharmacy Med Name: Montelukast Sodium Oral Tablet 10 MG] 90 tablet 3     Sig: TAKE 1 TABLET EVERY NIGHT           Med pended

## 2024-10-02 ENCOUNTER — HOSPITAL ENCOUNTER (EMERGENCY)
Age: 59
Discharge: ANOTHER HOSPITAL | End: 2024-10-02
Attending: STUDENT IN AN ORGANIZED HEALTH CARE EDUCATION/TRAINING PROGRAM
Payer: MEDICARE

## 2024-10-02 ENCOUNTER — APPOINTMENT (OUTPATIENT)
Dept: GENERAL RADIOLOGY | Age: 59
End: 2024-10-02
Payer: MEDICARE

## 2024-10-02 VITALS
WEIGHT: 285.4 LBS | OXYGEN SATURATION: 93 % | TEMPERATURE: 98.3 F | SYSTOLIC BLOOD PRESSURE: 129 MMHG | BODY MASS INDEX: 40.95 KG/M2 | HEART RATE: 79 BPM | DIASTOLIC BLOOD PRESSURE: 69 MMHG | RESPIRATION RATE: 21 BRPM

## 2024-10-02 DIAGNOSIS — I21.4 NSTEMI (NON-ST ELEVATED MYOCARDIAL INFARCTION) (HCC): ICD-10-CM

## 2024-10-02 DIAGNOSIS — I47.20 VENTRICULAR TACHYCARDIA (HCC): Primary | ICD-10-CM

## 2024-10-02 DIAGNOSIS — Z45.02 DEFIBRILLATOR DISCHARGE: ICD-10-CM

## 2024-10-02 LAB
ALBUMIN SERPL-MCNC: 4.8 G/DL (ref 3.4–5)
ALBUMIN/GLOB SERPL: 1.3 {RATIO} (ref 1.1–2.2)
ALP SERPL-CCNC: 111 U/L (ref 40–129)
ALT SERPL-CCNC: 29 U/L (ref 10–40)
ANION GAP SERPL CALCULATED.3IONS-SCNC: 16 MMOL/L (ref 3–16)
APTT BLD: 29.5 SEC (ref 22.1–36.4)
AST SERPL-CCNC: 23 U/L (ref 15–37)
BASOPHILS # BLD: 0.1 K/UL (ref 0–0.2)
BASOPHILS NFR BLD: 0.7 %
BILIRUB SERPL-MCNC: 0.4 MG/DL (ref 0–1)
BUN SERPL-MCNC: 31 MG/DL (ref 7–20)
CALCIUM SERPL-MCNC: 10 MG/DL (ref 8.3–10.6)
CHLORIDE SERPL-SCNC: 96 MMOL/L (ref 99–110)
CO2 SERPL-SCNC: 22 MMOL/L (ref 21–32)
CREAT SERPL-MCNC: 1.8 MG/DL (ref 0.8–1.3)
DEPRECATED RDW RBC AUTO: 15.7 % (ref 12.4–15.4)
EKG ATRIAL RATE: 118 BPM
EKG ATRIAL RATE: 133 BPM
EKG DIAGNOSIS: NORMAL
EKG DIAGNOSIS: NORMAL
EKG P AXIS: 53 DEGREES
EKG P AXIS: 59 DEGREES
EKG P-R INTERVAL: 186 MS
EKG P-R INTERVAL: 196 MS
EKG Q-T INTERVAL: 328 MS
EKG Q-T INTERVAL: 362 MS
EKG QRS DURATION: 130 MS
EKG QRS DURATION: 148 MS
EKG QTC CALCULATION (BAZETT): 507 MS
EKG QTC CALCULATION (BAZETT): 567 MS
EKG R AXIS: 71 DEGREES
EKG R AXIS: 71 DEGREES
EKG T AXIS: -57 DEGREES
EKG T AXIS: -72 DEGREES
EKG VENTRICULAR RATE: 118 BPM
EKG VENTRICULAR RATE: 180 BPM
EOSINOPHIL # BLD: 0.1 K/UL (ref 0–0.6)
EOSINOPHIL NFR BLD: 1.2 %
FLUAV RNA RESP QL NAA+PROBE: NOT DETECTED
FLUBV RNA RESP QL NAA+PROBE: NOT DETECTED
GFR SERPLBLD CREATININE-BSD FMLA CKD-EPI: 42 ML/MIN/{1.73_M2}
GLUCOSE SERPL-MCNC: 235 MG/DL (ref 70–99)
HCT VFR BLD AUTO: 57.4 % (ref 40.5–52.5)
HGB BLD-MCNC: 18.2 G/DL (ref 13.5–17.5)
LACTATE BLDV-SCNC: 1.3 MMOL/L (ref 0.4–1.9)
LACTATE BLDV-SCNC: 2 MMOL/L (ref 0.4–1.9)
LYMPHOCYTES # BLD: 3.2 K/UL (ref 1–5.1)
LYMPHOCYTES NFR BLD: 28.4 %
MCH RBC QN AUTO: 27.5 PG (ref 26–34)
MCHC RBC AUTO-ENTMCNC: 31.7 G/DL (ref 31–36)
MCV RBC AUTO: 86.8 FL (ref 80–100)
MONOCYTES # BLD: 1.1 K/UL (ref 0–1.3)
MONOCYTES NFR BLD: 10.3 %
NEUTROPHILS # BLD: 6.6 K/UL (ref 1.7–7.7)
NEUTROPHILS NFR BLD: 59.4 %
PLATELET # BLD AUTO: 219 K/UL (ref 135–450)
PMV BLD AUTO: 7.9 FL (ref 5–10.5)
POTASSIUM SERPL-SCNC: 3.8 MMOL/L (ref 3.5–5.1)
PROT SERPL-MCNC: 8.6 G/DL (ref 6.4–8.2)
RBC # BLD AUTO: 6.62 M/UL (ref 4.2–5.9)
SARS-COV-2 RNA RESP QL NAA+PROBE: NOT DETECTED
SODIUM SERPL-SCNC: 134 MMOL/L (ref 136–145)
TROPONIN, HIGH SENSITIVITY: 43 NG/L (ref 0–22)
TROPONIN, HIGH SENSITIVITY: 86 NG/L (ref 0–22)
WBC # BLD AUTO: 11.1 K/UL (ref 4–11)

## 2024-10-02 PROCEDURE — 96365 THER/PROPH/DIAG IV INF INIT: CPT

## 2024-10-02 PROCEDURE — 6360000002 HC RX W HCPCS: Performed by: PHYSICIAN ASSISTANT

## 2024-10-02 PROCEDURE — 85025 COMPLETE CBC W/AUTO DIFF WBC: CPT

## 2024-10-02 PROCEDURE — 85730 THROMBOPLASTIN TIME PARTIAL: CPT

## 2024-10-02 PROCEDURE — 2580000003 HC RX 258: Performed by: STUDENT IN AN ORGANIZED HEALTH CARE EDUCATION/TRAINING PROGRAM

## 2024-10-02 PROCEDURE — 99285 EMERGENCY DEPT VISIT HI MDM: CPT

## 2024-10-02 PROCEDURE — 6360000002 HC RX W HCPCS: Performed by: STUDENT IN AN ORGANIZED HEALTH CARE EDUCATION/TRAINING PROGRAM

## 2024-10-02 PROCEDURE — 87636 SARSCOV2 & INF A&B AMP PRB: CPT

## 2024-10-02 PROCEDURE — 80053 COMPREHEN METABOLIC PANEL: CPT

## 2024-10-02 PROCEDURE — 93005 ELECTROCARDIOGRAM TRACING: CPT | Performed by: STUDENT IN AN ORGANIZED HEALTH CARE EDUCATION/TRAINING PROGRAM

## 2024-10-02 PROCEDURE — 96375 TX/PRO/DX INJ NEW DRUG ADDON: CPT

## 2024-10-02 PROCEDURE — 84484 ASSAY OF TROPONIN QUANT: CPT

## 2024-10-02 PROCEDURE — 83605 ASSAY OF LACTIC ACID: CPT

## 2024-10-02 PROCEDURE — 71045 X-RAY EXAM CHEST 1 VIEW: CPT

## 2024-10-02 PROCEDURE — 96376 TX/PRO/DX INJ SAME DRUG ADON: CPT

## 2024-10-02 PROCEDURE — 87040 BLOOD CULTURE FOR BACTERIA: CPT

## 2024-10-02 RX ORDER — HEPARIN SODIUM 10000 [USP'U]/100ML
5-30 INJECTION, SOLUTION INTRAVENOUS CONTINUOUS
Status: DISCONTINUED | OUTPATIENT
Start: 2024-10-02 | End: 2024-10-02 | Stop reason: HOSPADM

## 2024-10-02 RX ORDER — ONDANSETRON 2 MG/ML
4 INJECTION INTRAMUSCULAR; INTRAVENOUS ONCE
Status: COMPLETED | OUTPATIENT
Start: 2024-10-02 | End: 2024-10-02

## 2024-10-02 RX ORDER — LORAZEPAM 2 MG/ML
1 INJECTION INTRAMUSCULAR ONCE
Status: COMPLETED | OUTPATIENT
Start: 2024-10-02 | End: 2024-10-02

## 2024-10-02 RX ORDER — HEPARIN SODIUM 1000 [USP'U]/ML
4000 INJECTION, SOLUTION INTRAVENOUS; SUBCUTANEOUS ONCE
Status: COMPLETED | OUTPATIENT
Start: 2024-10-02 | End: 2024-10-02

## 2024-10-02 RX ORDER — MORPHINE SULFATE 4 MG/ML
4 INJECTION, SOLUTION INTRAMUSCULAR; INTRAVENOUS ONCE
Status: COMPLETED | OUTPATIENT
Start: 2024-10-02 | End: 2024-10-02

## 2024-10-02 RX ORDER — HEPARIN SODIUM 1000 [USP'U]/ML
2000 INJECTION, SOLUTION INTRAVENOUS; SUBCUTANEOUS PRN
Status: DISCONTINUED | OUTPATIENT
Start: 2024-10-02 | End: 2024-10-02 | Stop reason: HOSPADM

## 2024-10-02 RX ORDER — HEPARIN SODIUM 1000 [USP'U]/ML
4000 INJECTION, SOLUTION INTRAVENOUS; SUBCUTANEOUS PRN
Status: DISCONTINUED | OUTPATIENT
Start: 2024-10-02 | End: 2024-10-02 | Stop reason: HOSPADM

## 2024-10-02 RX ORDER — AMIODARONE HYDROCHLORIDE 150 MG/3ML
300 INJECTION, SOLUTION INTRAVENOUS ONCE
Status: COMPLETED | OUTPATIENT
Start: 2024-10-02 | End: 2024-10-02

## 2024-10-02 RX ADMIN — MORPHINE SULFATE 4 MG: 4 INJECTION, SOLUTION INTRAMUSCULAR; INTRAVENOUS at 14:33

## 2024-10-02 RX ADMIN — MORPHINE SULFATE 4 MG: 4 INJECTION, SOLUTION INTRAMUSCULAR; INTRAVENOUS at 11:44

## 2024-10-02 RX ADMIN — HEPARIN SODIUM 4000 UNITS: 1000 INJECTION INTRAVENOUS; SUBCUTANEOUS at 13:27

## 2024-10-02 RX ADMIN — HEPARIN SODIUM 7 UNITS/KG/HR: 10000 INJECTION, SOLUTION INTRAVENOUS at 13:31

## 2024-10-02 RX ADMIN — LORAZEPAM 1 MG: 2 INJECTION INTRAMUSCULAR; INTRAVENOUS at 12:00

## 2024-10-02 RX ADMIN — ONDANSETRON 4 MG: 2 INJECTION INTRAMUSCULAR; INTRAVENOUS at 11:42

## 2024-10-02 RX ADMIN — AMIODARONE HYDROCHLORIDE 1 MG/MIN: 50 INJECTION, SOLUTION INTRAVENOUS at 11:55

## 2024-10-02 RX ADMIN — AMIODARONE HYDROCHLORIDE 300 MG: 50 INJECTION, SOLUTION INTRAVENOUS at 11:40

## 2024-10-02 ASSESSMENT — PAIN DESCRIPTION - LOCATION
LOCATION: BACK
LOCATION: GENERALIZED
LOCATION: BACK

## 2024-10-02 ASSESSMENT — PAIN SCALES - GENERAL
PAINLEVEL_OUTOF10: 5
PAINLEVEL_OUTOF10: 7
PAINLEVEL_OUTOF10: 8

## 2024-10-02 ASSESSMENT — PAIN DESCRIPTION - DESCRIPTORS
DESCRIPTORS: SORE
DESCRIPTORS: SORE

## 2024-10-02 ASSESSMENT — PAIN - FUNCTIONAL ASSESSMENT: PAIN_FUNCTIONAL_ASSESSMENT: 0-10

## 2024-10-02 NOTE — ED NOTES
Patient transported to [facility] at this time. Report given to transport team and ICU RN at [facility]. Patient stable at time of transfer.

## 2024-10-02 NOTE — ED PROVIDER NOTES
his cardiologist Dr. Melgar.  Dr. Melgar agrees with current plan of care and agrees the patient should be transferred to the ICU at [facility].  Findings were discussed with the patient who is comfortable and in agreement with plan of care.  He will be transferred for further workup and treatment of his condition.       Patient was given the following medications:   Medications   amiodarone (CORDARONE) 450 mg in dextrose 5 % 250 mL infusion (1 mg/min IntraVENous New Bag 10/2/24 1155)     Followed by   amiodarone (CORDARONE) 450 mg in dextrose 5 % 250 mL infusion (has no administration in time range)   heparin (porcine) injection 4,000 Units (has no administration in time range)   heparin (porcine) injection 2,000 Units (has no administration in time range)   heparin 25,000 units in dextrose 5% 250 mL (premix) infusion (7 Units/kg/hr × 129.5 kg IntraVENous New Bag 10/2/24 1331)   morphine sulfate (PF) injection 4 mg (4 mg IntraVENous Given 10/2/24 1144)   ondansetron (ZOFRAN) injection 4 mg (4 mg IntraVENous Given 10/2/24 1142)   amiodarone (CORDARONE) injection 300 mg (300 mg IntraVENous Given 10/2/24 1140)   LORazepam (ATIVAN) injection 1 mg (1 mg IntraVENous Given 10/2/24 1200)   heparin (porcine) injection 4,000 Units (4,000 Units IntraVENous Given 10/2/24 1327)        CONSULTS:   None   Discussion with Other Professionals: None   Social Determinants: None   Chronic Conditions: CAD, CHF, AICD  Records Reviewed: NA      Disposition Considerations: Transferred to [facility]  I am the Primary Clinician of Record.        FINAL IMPRESSION    1. Ventricular tachycardia (HCC)    2. Defibrillator discharge    3. NSTEMI (non-ST elevated myocardial infarction) (HCC)           DISPOSITION/PLAN:     Pt admitted to hospital for further workup.    PATIENT REFERRED TO:   No follow-up provider specified.     DISCHARGE MEDICATIONS:   New Prescriptions    No medications on file        DISCONTINUED MEDICATIONS:

## 2024-10-02 NOTE — CONSULTS
Pharmacy to Manage Heparin Infusion per Hospital Nom[facility]    Dx: ACS  Weight: 129.5 kg   Baseline aPTT: in process    Labs:  Recent Labs     10/02/24  1145   HGB 18.2*   HCT 57.4*        Oral factor Xa-inhibitors may alter and elevate anti-Xa levels used for unfractionated heparin monitoring. As a result, anti-Xa monitoring is not accurate while Xa-inhibitor activity is detectable. Will utilize aPTT monitoring since patient received an oral factor Xa-inhibitor (apixaban) within 72 hours prior to admission (10/1 at ~1400). The goal is to allow a washout of oral factor Xa-inhibitors by using aPTT for 72 hours, then change to ant-Xa levels for UFH.     Heparin (weight-based) Infusion: CAD/STEMI/NSTEMI/UA/AFib)   Will initiate heparin therapy with a bolus of 4000 units (dose capped at 30 units/kg) followed by heparin infusion at 7 units/kg/hr (906.5 units/hr).  Plan to check aPTT in 6 hours.  Goal aPTT 73 - 102 seconds    Thank you for the consult!   Vicky Arrington, PharmD, BCCCP [id number]

## 2024-10-04 ENCOUNTER — HOSPITAL ENCOUNTER (EMERGENCY)
Age: 59
Discharge: HOME | End: 2024-10-05
Attending: STUDENT IN AN ORGANIZED HEALTH CARE EDUCATION/TRAINING PROGRAM
Payer: MEDICARE

## 2024-10-04 DIAGNOSIS — R07.9 CHEST PAIN, UNSPECIFIED TYPE: Primary | ICD-10-CM

## 2024-10-04 DIAGNOSIS — F41.1 ANXIETY STATE: ICD-10-CM

## 2024-10-04 LAB
BASOPHILS # BLD: 0.1 K/UL (ref 0–0.2)
BASOPHILS NFR BLD: 0.8 %
DEPRECATED RDW RBC AUTO: 15.5 % (ref 12.4–15.4)
EOSINOPHIL # BLD: 0.2 K/UL (ref 0–0.6)
EOSINOPHIL NFR BLD: 1.7 %
HCT VFR BLD AUTO: 54.7 % (ref 40.5–52.5)
HGB BLD-MCNC: 17.6 G/DL (ref 13.5–17.5)
LYMPHOCYTES # BLD: 2.1 K/UL (ref 1–5.1)
LYMPHOCYTES NFR BLD: 18.6 %
MCH RBC QN AUTO: 27.5 PG (ref 26–34)
MCHC RBC AUTO-ENTMCNC: 32.1 G/DL (ref 31–36)
MCV RBC AUTO: 85.6 FL (ref 80–100)
MONOCYTES # BLD: 0.9 K/UL (ref 0–1.3)
MONOCYTES NFR BLD: 8.3 %
NEUTROPHILS # BLD: 7.8 K/UL (ref 1.7–7.7)
NEUTROPHILS NFR BLD: 70.6 %
PLATELET # BLD AUTO: 216 K/UL (ref 135–450)
PMV BLD AUTO: 7.8 FL (ref 5–10.5)
RBC # BLD AUTO: 6.4 M/UL (ref 4.2–5.9)
WBC # BLD AUTO: 11 K/UL (ref 4–11)

## 2024-10-04 PROCEDURE — 80053 COMPREHEN METABOLIC PANEL: CPT

## 2024-10-04 PROCEDURE — 99285 EMERGENCY DEPT VISIT HI MDM: CPT

## 2024-10-04 PROCEDURE — 85025 COMPLETE CBC W/AUTO DIFF WBC: CPT

## 2024-10-04 PROCEDURE — 84484 ASSAY OF TROPONIN QUANT: CPT

## 2024-10-04 PROCEDURE — 93005 ELECTROCARDIOGRAM TRACING: CPT | Performed by: STUDENT IN AN ORGANIZED HEALTH CARE EDUCATION/TRAINING PROGRAM

## 2024-10-05 ENCOUNTER — APPOINTMENT (OUTPATIENT)
Dept: GENERAL RADIOLOGY | Age: 59
End: 2024-10-05
Payer: MEDICARE

## 2024-10-05 VITALS
RESPIRATION RATE: 16 BRPM | TEMPERATURE: 98.5 F | SYSTOLIC BLOOD PRESSURE: 152 MMHG | OXYGEN SATURATION: 92 % | HEART RATE: 79 BPM | DIASTOLIC BLOOD PRESSURE: 97 MMHG

## 2024-10-05 LAB
ALBUMIN SERPL-MCNC: 3.9 G/DL (ref 3.4–5)
ALBUMIN/GLOB SERPL: 1.1 {RATIO} (ref 1.1–2.2)
ALP SERPL-CCNC: 97 U/L (ref 40–129)
ALT SERPL-CCNC: 27 U/L (ref 10–40)
ANION GAP SERPL CALCULATED.3IONS-SCNC: 15 MMOL/L (ref 3–16)
AST SERPL-CCNC: 35 U/L (ref 15–37)
BILIRUB SERPL-MCNC: 0.6 MG/DL (ref 0–1)
BUN SERPL-MCNC: 30 MG/DL (ref 7–20)
CALCIUM SERPL-MCNC: 9.5 MG/DL (ref 8.3–10.6)
CHLORIDE SERPL-SCNC: 98 MMOL/L (ref 99–110)
CO2 SERPL-SCNC: 19 MMOL/L (ref 21–32)
CREAT SERPL-MCNC: 2 MG/DL (ref 0.8–1.3)
EKG ATRIAL RATE: 89 BPM
EKG DIAGNOSIS: NORMAL
EKG P AXIS: 20 DEGREES
EKG P-R INTERVAL: 214 MS
EKG Q-T INTERVAL: 382 MS
EKG QRS DURATION: 148 MS
EKG QTC CALCULATION (BAZETT): 464 MS
EKG R AXIS: 67 DEGREES
EKG T AXIS: -77 DEGREES
EKG VENTRICULAR RATE: 89 BPM
GFR SERPLBLD CREATININE-BSD FMLA CKD-EPI: 37 ML/MIN/{1.73_M2}
GLUCOSE SERPL-MCNC: 197 MG/DL (ref 70–99)
POTASSIUM SERPL-SCNC: 4.2 MMOL/L (ref 3.5–5.1)
PROT SERPL-MCNC: 7.4 G/DL (ref 6.4–8.2)
SODIUM SERPL-SCNC: 132 MMOL/L (ref 136–145)
TROPONIN, HIGH SENSITIVITY: 139 NG/L (ref 0–22)
TROPONIN, HIGH SENSITIVITY: 147 NG/L (ref 0–22)

## 2024-10-05 PROCEDURE — 84484 ASSAY OF TROPONIN QUANT: CPT

## 2024-10-05 PROCEDURE — 96374 THER/PROPH/DIAG INJ IV PUSH: CPT

## 2024-10-05 PROCEDURE — 71045 X-RAY EXAM CHEST 1 VIEW: CPT

## 2024-10-05 PROCEDURE — 6360000002 HC RX W HCPCS: Performed by: STUDENT IN AN ORGANIZED HEALTH CARE EDUCATION/TRAINING PROGRAM

## 2024-10-05 PROCEDURE — 93010 ELECTROCARDIOGRAM REPORT: CPT | Performed by: INTERNAL MEDICINE

## 2024-10-05 RX ORDER — LORAZEPAM 0.5 MG/1
0.5 TABLET ORAL EVERY 6 HOURS PRN
Qty: 12 TABLET | Refills: 0 | Status: SHIPPED | OUTPATIENT
Start: 2024-10-05 | End: 2024-10-08

## 2024-10-05 RX ORDER — LORAZEPAM 2 MG/ML
1 INJECTION INTRAMUSCULAR ONCE
Status: COMPLETED | OUTPATIENT
Start: 2024-10-05 | End: 2024-10-05

## 2024-10-05 RX ADMIN — LORAZEPAM 1 MG: 2 INJECTION INTRAMUSCULAR; INTRAVENOUS at 01:23

## 2024-10-05 NOTE — ED NOTES
Patient called out asking if this RN could place him on O2 relates that he just feels better with it.

## 2024-10-05 NOTE — ED NOTES
[facility]  ED  EMERGENCY DEPARTMENT ENCOUNTER        Patient Name: Dinesh Partida  MRN: [id number]  Birthdate 1965  Date of evaluation: 10/4/2024  PCP: Zee Patel MD  Note Started: 1:01 AM EDT 10/5/24      CHIEF COMPLAINT   Chest Pain, Shortness of Breath, and Anxiety (Patient arrives to ED via EMS just D/C'd from [facility] ICU today started with some SOB,Chest pain when anxiety kicked in feeling better)         HISTORY & PHYSICAL     HISTORY OF PRESENT ILLNESS  History from : Patient    Limitations to history : None    Dinesh Partida is a 59 y.o. male  has no past medical history on file., who presents to the ED complaining of chest pain and SOB. Patient presented to the ED 2 days ago for defibrillator shocks and sent to [facility]. He was discharged today. Patient states this evening he was sleeping and felt like he stopped breathing, which woke him up. He also states he was having L sided chest pressure, SOB, and diaphoresis. Chest pain does not worsen with exertion. Patient attributes these symptoms to his anxiety since being discharged. He states he feels better since arriving to the ED. He denies nausea/vomiting.       No other complaints, modifying factors or associated symptoms.  Nursing Notes were all reviewed and agreed with or any disagreements were addressed in the HPI.    I have reviewed the following from the nursing documentation.    No past medical history on file.  No past surgical history on file.  No family history on file.  Social History     Socioeconomic History    Marital status:      Spouse name: Not on file    Number of children: Not on file    Years of education: Not on file    Highest education level: Not on file   Occupational History    Not on file   Tobacco Use    Smoking status: Former     Current packs/day: 0.00     Average packs/day: 3.0 packs/day for 35.0 years (105.0 ttl pk-yrs)     Types: Cigarettes     Start date: 1/1/1983      counseled patient how to take these medications.   New Prescriptions    LORAZEPAM (ATIVAN) 0.5 MG TABLET    Take 1 tablet by mouth every 6 hours as needed for Anxiety for up to 3 days. Max Daily Amount: 2 mg       MDM:  Patient well-appearing throughout ED admission. Vitals reassuring. EKG unremarkable. Initial troponin elevated, however, expected due to recent cardiac events. Repeat troponin decreased. CXR unremarkable. CBC and CMP stable. Patient given 1mg of Ativan for anxiety symptoms. After re-evaluation, patient feeling better after administration of Ativan. Based on history and workup, very low suspicion for cardiac etiology.     At this time, feel the patient is appropriate for discharge to follow-up with a primary care doctor.  Shared decision making with patient was employed and they are comfortable with discharge at this time.  Patient was provided with prescriptions for Lorazepam 0.5mg until PCP and psychology follow-up. Patient educated on long term complications of benzodiazepine use. Patient advised to return on worsening chest pain and SOB. Follow up with PCP in 2 days. Patient discharged in stable condition.    Vitals:    Vitals:    10/05/24 0000 10/05/24 0104 10/05/24 0119 10/05/24 0211   BP:  (!) 138/92 (!) 156/83 (!) 152/97   Pulse: 83 82 85 79   Resp: 15 20 14 16   Temp:       TempSrc:       SpO2: 90% 93% 93% 92%       FINAL IMPRESSION      1. Chest pain, unspecified type    2. Anxiety state          DISPOSITION/PLAN   Disposition: DISPOSITION Decision To Discharge 10/05/2024 02:08:52 AM  Condition at Disposition: Data Unavailable    Condition: stable     DISCHARGE MEDICATIONS:  Patient was given scripts for the following medications. I counseled patient how to take these medications:  New Prescriptions    LORAZEPAM (ATIVAN) 0.5 MG TABLET    Take 1 tablet by mouth every 6 hours as needed for Anxiety for up to 3 days. Max Daily Amount: 2 mg       DISCONTINUED MEDICATIONS:  Discontinued

## 2024-10-05 NOTE — DISCHARGE INSTRUCTIONS
Return to nearest ED if you have severe worsening chest pain, shortness of breath, other concerning symptoms.  The lorazepam should replace your alprazolam, you should not take them together.  You should not drink alcohol with this medication.  Follow-up with your PCP and psychology on Monday for further evaluation.

## 2024-10-06 LAB
BACTERIA BLD CULT ORG #2: NORMAL
BACTERIA BLD CULT: NORMAL

## 2024-10-07 RX ORDER — AZELASTINE 1 MG/ML
SPRAY, METERED NASAL
Qty: 120 ML | Refills: 3 | OUTPATIENT
Start: 2024-10-07

## 2025-01-13 ENCOUNTER — TELEPHONE (OUTPATIENT)
Dept: PULMONOLOGY | Age: 60
End: 2025-01-13

## 2025-01-13 NOTE — TELEPHONE ENCOUNTER
Received refill request for Asteline nasal spray.  Medication has been refused previously because patient has not been seen since 1/10/23 with Dr. Landers.  When we attempted to reschedule his appointment with a different provider after Dr. Landers left, pt declined to schedule at that time.    I LM asking pt to call office back to see if he plans to follow up in our clinic or if he has gone elsewhere.

## 2025-02-02 ENCOUNTER — HOSPITAL ENCOUNTER (INPATIENT)
Age: 60
LOS: 5 days | Discharge: HOME | DRG: 871 | End: 2025-02-07
Attending: EMERGENCY MEDICINE | Admitting: INTERNAL MEDICINE
Payer: MEDICARE

## 2025-02-02 ENCOUNTER — APPOINTMENT (OUTPATIENT)
Dept: GENERAL RADIOLOGY | Age: 60
DRG: 871 | End: 2025-02-02
Payer: MEDICARE

## 2025-02-02 ENCOUNTER — APPOINTMENT (OUTPATIENT)
Dept: CT IMAGING | Age: 60
DRG: 871 | End: 2025-02-02
Payer: MEDICARE

## 2025-02-02 ENCOUNTER — ANCILLARY PROCEDURE (OUTPATIENT)
Dept: EMERGENCY DEPT | Age: 60
DRG: 871 | End: 2025-02-02
Attending: EMERGENCY MEDICINE
Payer: MEDICARE

## 2025-02-02 DIAGNOSIS — I50.9 ACUTE ON CHRONIC CONGESTIVE HEART FAILURE, UNSPECIFIED HEART FAILURE TYPE (HCC): ICD-10-CM

## 2025-02-02 DIAGNOSIS — J96.01 ACUTE HYPOXIC RESPIRATORY FAILURE: Primary | ICD-10-CM

## 2025-02-02 DIAGNOSIS — I50.9 CONGESTIVE HEART FAILURE, UNSPECIFIED HF CHRONICITY, UNSPECIFIED HEART FAILURE TYPE (HCC): ICD-10-CM

## 2025-02-02 LAB
ALBUMIN SERPL-MCNC: 4.1 G/DL (ref 3.4–5)
ALBUMIN/GLOB SERPL: 1.1 {RATIO} (ref 1.1–2.2)
ALP SERPL-CCNC: 119 U/L (ref 40–129)
ALT SERPL-CCNC: 199 U/L (ref 10–40)
ANION GAP SERPL CALCULATED.3IONS-SCNC: 17 MMOL/L (ref 3–16)
ANISOCYTOSIS BLD QL SMEAR: ABNORMAL
AST SERPL-CCNC: 80 U/L (ref 15–37)
BASE EXCESS BLDA CALC-SCNC: -4.1 MMOL/L (ref -3–3)
BASE EXCESS BLDA CALC-SCNC: -7.2 MMOL/L (ref -3–3)
BASOPHILS # BLD: 0.2 K/UL (ref 0–0.2)
BASOPHILS NFR BLD: 1 %
BILIRUB SERPL-MCNC: 0.4 MG/DL (ref 0–1)
BUN SERPL-MCNC: 27 MG/DL (ref 7–20)
CALCIUM SERPL-MCNC: 9.1 MG/DL (ref 8.3–10.6)
CHLORIDE SERPL-SCNC: 99 MMOL/L (ref 99–110)
CO2 BLDA-SCNC: 19.9 MMOL/L
CO2 BLDA-SCNC: 22.5 MMOL/L
CO2 SERPL-SCNC: 21 MMOL/L (ref 21–32)
COHGB MFR BLDA: 0.7 % (ref 0–1.5)
COHGB MFR BLDA: 0.8 % (ref 0–1.5)
CREAT SERPL-MCNC: 2.2 MG/DL (ref 0.8–1.3)
DEPRECATED RDW RBC AUTO: 17.3 % (ref 12.4–15.4)
EOSINOPHIL # BLD: 0 K/UL (ref 0–0.6)
EOSINOPHIL NFR BLD: 0 %
FLUAV RNA RESP QL NAA+PROBE: NOT DETECTED
FLUBV RNA RESP QL NAA+PROBE: NOT DETECTED
GFR SERPLBLD CREATININE-BSD FMLA CKD-EPI: 33 ML/MIN/{1.73_M2}
GLUCOSE SERPL-MCNC: 353 MG/DL (ref 70–99)
HCO3 BLDA-SCNC: 18.7 MMOL/L (ref 21–29)
HCO3 BLDA-SCNC: 21.2 MMOL/L (ref 21–29)
HCT VFR BLD AUTO: 53.2 % (ref 40.5–52.5)
HGB BLD-MCNC: 17.3 G/DL (ref 13.5–17.5)
HGB BLDA-MCNC: 16.1 G/DL (ref 13.5–17.5)
HGB BLDA-MCNC: 17.3 G/DL (ref 13.5–17.5)
LACTATE BLDV-SCNC: 2.1 MMOL/L (ref 0.4–2)
LACTATE BLDV-SCNC: 4.9 MMOL/L (ref 0.4–2)
LYMPHOCYTES # BLD: 5 K/UL (ref 1–5.1)
LYMPHOCYTES NFR BLD: 19 %
MACROCYTES BLD QL SMEAR: ABNORMAL
MCH RBC QN AUTO: 28.8 PG (ref 26–34)
MCHC RBC AUTO-ENTMCNC: 32.6 G/DL (ref 31–36)
MCV RBC AUTO: 88.5 FL (ref 80–100)
METHGB MFR BLDA: 0.3 %
METHGB MFR BLDA: 0.3 %
MONOCYTES # BLD: 0.9 K/UL (ref 0–1.3)
MONOCYTES NFR BLD: 5 %
MYELOCYTES NFR BLD MANUAL: 2 %
NEUTROPHILS # BLD: 12.5 K/UL (ref 1.7–7.7)
NEUTROPHILS NFR BLD: 65 %
NT-PROBNP SERPL-MCNC: 2536 PG/ML (ref 0–124)
O2 THERAPY: ABNORMAL
O2 THERAPY: ABNORMAL
OVALOCYTES BLD QL SMEAR: ABNORMAL
PATH INTERP BLD-IMP: YES
PCO2 BLDA: 39.3 MMHG (ref 35–45)
PCO2 BLDA: 40.1 MMHG (ref 35–45)
PH BLDA: 7.29 [PH] (ref 7.35–7.45)
PH BLDA: 7.34 [PH] (ref 7.35–7.45)
PLATELET # BLD AUTO: 285 K/UL (ref 135–450)
PLATELET BLD QL SMEAR: ADEQUATE
PMV BLD AUTO: 8.1 FL (ref 5–10.5)
PO2 BLDA: 70.6 MMHG (ref 75–108)
PO2 BLDA: 88.4 MMHG (ref 75–108)
POIKILOCYTOSIS BLD QL SMEAR: ABNORMAL
POTASSIUM SERPL-SCNC: 5.1 MMOL/L (ref 3.5–5.1)
PROT SERPL-MCNC: 7.7 G/DL (ref 6.4–8.2)
RBC # BLD AUTO: 6 M/UL (ref 4.2–5.9)
SAO2 % BLDA: 92.4 %
SAO2 % BLDA: 95.5 %
SARS-COV-2 RNA RESP QL NAA+PROBE: NOT DETECTED
SLIDE REVIEW: ABNORMAL
SODIUM SERPL-SCNC: 137 MMOL/L (ref 136–145)
TROPONIN, HIGH SENSITIVITY: 29 NG/L (ref 0–22)
TROPONIN, HIGH SENSITIVITY: 33 NG/L (ref 0–22)
VARIANT LYMPHS NFR BLD MANUAL: 8 % (ref 0–6)
WBC # BLD AUTO: 18.6 K/UL (ref 4–11)

## 2025-02-02 PROCEDURE — 94761 N-INVAS EAR/PLS OXIMETRY MLT: CPT

## 2025-02-02 PROCEDURE — 94660 CPAP INITIATION&MGMT: CPT

## 2025-02-02 PROCEDURE — 85025 COMPLETE CBC W/AUTO DIFF WBC: CPT

## 2025-02-02 PROCEDURE — 2700000000 HC OXYGEN THERAPY PER DAY

## 2025-02-02 PROCEDURE — 2500000003 HC RX 250 WO HCPCS

## 2025-02-02 PROCEDURE — 2060000000 HC ICU INTERMEDIATE R&B

## 2025-02-02 PROCEDURE — 99285 EMERGENCY DEPT VISIT HI MDM: CPT

## 2025-02-02 PROCEDURE — 96374 THER/PROPH/DIAG INJ IV PUSH: CPT

## 2025-02-02 PROCEDURE — 87636 SARSCOV2 & INF A&B AMP PRB: CPT

## 2025-02-02 PROCEDURE — 82803 BLOOD GASES ANY COMBINATION: CPT

## 2025-02-02 PROCEDURE — 6360000002 HC RX W HCPCS

## 2025-02-02 PROCEDURE — 83880 ASSAY OF NATRIURETIC PEPTIDE: CPT

## 2025-02-02 PROCEDURE — 93005 ELECTROCARDIOGRAM TRACING: CPT | Performed by: EMERGENCY MEDICINE

## 2025-02-02 PROCEDURE — 5A09457 ASSISTANCE WITH RESPIRATORY VENTILATION, 24-96 CONSECUTIVE HOURS, CONTINUOUS POSITIVE AIRWAY PRESSURE: ICD-10-PCS | Performed by: INTERNAL MEDICINE

## 2025-02-02 PROCEDURE — 71045 X-RAY EXAM CHEST 1 VIEW: CPT

## 2025-02-02 PROCEDURE — 87040 BLOOD CULTURE FOR BACTERIA: CPT

## 2025-02-02 PROCEDURE — 84484 ASSAY OF TROPONIN QUANT: CPT

## 2025-02-02 PROCEDURE — 80053 COMPREHEN METABOLIC PANEL: CPT

## 2025-02-02 PROCEDURE — 6370000000 HC RX 637 (ALT 250 FOR IP)

## 2025-02-02 PROCEDURE — 83605 ASSAY OF LACTIC ACID: CPT

## 2025-02-02 PROCEDURE — 93308 TTE F-UP OR LMTD: CPT

## 2025-02-02 PROCEDURE — 36415 COLL VENOUS BLD VENIPUNCTURE: CPT

## 2025-02-02 RX ORDER — WATER 10 ML/10ML
INJECTION INTRAMUSCULAR; INTRAVENOUS; SUBCUTANEOUS
Status: COMPLETED
Start: 2025-02-02 | End: 2025-02-02

## 2025-02-02 RX ORDER — ACETAMINOPHEN 325 MG/1
650 TABLET ORAL EVERY 6 HOURS PRN
Status: DISCONTINUED | OUTPATIENT
Start: 2025-02-02 | End: 2025-02-07 | Stop reason: HOSPADM

## 2025-02-02 RX ORDER — INSULIN LISPRO 100 [IU]/ML
0-8 INJECTION, SOLUTION INTRAVENOUS; SUBCUTANEOUS EVERY 4 HOURS
Status: DISCONTINUED | OUTPATIENT
Start: 2025-02-02 | End: 2025-02-07 | Stop reason: HOSPADM

## 2025-02-02 RX ORDER — SODIUM CHLORIDE 0.9 % (FLUSH) 0.9 %
5-40 SYRINGE (ML) INJECTION PRN
Status: DISCONTINUED | OUTPATIENT
Start: 2025-02-02 | End: 2025-02-07 | Stop reason: HOSPADM

## 2025-02-02 RX ORDER — NITROGLYCERIN 0.4 MG/1
TABLET SUBLINGUAL
Status: DISPENSED
Start: 2025-02-02 | End: 2025-02-03

## 2025-02-02 RX ORDER — ACETAMINOPHEN 650 MG/1
650 SUPPOSITORY RECTAL EVERY 6 HOURS PRN
Status: DISCONTINUED | OUTPATIENT
Start: 2025-02-02 | End: 2025-02-07 | Stop reason: HOSPADM

## 2025-02-02 RX ORDER — FUROSEMIDE 10 MG/ML
INJECTION INTRAMUSCULAR; INTRAVENOUS
Status: COMPLETED
Start: 2025-02-02 | End: 2025-02-02

## 2025-02-02 RX ORDER — IOPAMIDOL 755 MG/ML
75 INJECTION, SOLUTION INTRAVASCULAR
Status: COMPLETED | OUTPATIENT
Start: 2025-02-02 | End: 2025-02-03

## 2025-02-02 RX ORDER — NITROGLYCERIN 0.4 MG/1
0.4 TABLET SUBLINGUAL EVERY 5 MIN PRN
Status: DISCONTINUED | OUTPATIENT
Start: 2025-02-02 | End: 2025-02-07 | Stop reason: HOSPADM

## 2025-02-02 RX ORDER — SODIUM CHLORIDE 9 MG/ML
INJECTION, SOLUTION INTRAVENOUS PRN
Status: DISCONTINUED | OUTPATIENT
Start: 2025-02-02 | End: 2025-02-07 | Stop reason: HOSPADM

## 2025-02-02 RX ORDER — SODIUM CHLORIDE 0.9 % (FLUSH) 0.9 %
5-40 SYRINGE (ML) INJECTION EVERY 12 HOURS SCHEDULED
Status: DISCONTINUED | OUTPATIENT
Start: 2025-02-02 | End: 2025-02-07 | Stop reason: HOSPADM

## 2025-02-02 RX ORDER — METHYLPREDNISOLONE SODIUM SUCCINATE 125 MG/2ML
INJECTION INTRAMUSCULAR; INTRAVENOUS
Status: COMPLETED
Start: 2025-02-02 | End: 2025-02-02

## 2025-02-02 RX ORDER — FUROSEMIDE 10 MG/ML
60 INJECTION INTRAMUSCULAR; INTRAVENOUS ONCE
Status: COMPLETED | OUTPATIENT
Start: 2025-02-02 | End: 2025-02-02

## 2025-02-02 RX ORDER — IPRATROPIUM BROMIDE AND ALBUTEROL SULFATE 2.5; .5 MG/3ML; MG/3ML
SOLUTION RESPIRATORY (INHALATION)
Status: COMPLETED
Start: 2025-02-02 | End: 2025-02-02

## 2025-02-02 RX ADMIN — FUROSEMIDE 60 MG: 10 INJECTION INTRAMUSCULAR; INTRAVENOUS at 21:24

## 2025-02-02 RX ADMIN — IPRATROPIUM BROMIDE AND ALBUTEROL SULFATE 3 ML: 2.5; .5 SOLUTION RESPIRATORY (INHALATION) at 21:23

## 2025-02-02 RX ADMIN — FUROSEMIDE 60 MG: 10 INJECTION, SOLUTION INTRAMUSCULAR; INTRAVENOUS at 21:24

## 2025-02-02 RX ADMIN — WATER: 1 INJECTION INTRAMUSCULAR; INTRAVENOUS; SUBCUTANEOUS at 21:24

## 2025-02-02 RX ADMIN — METHYLPREDNISOLONE SODIUM SUCCINATE 125 MG: 125 INJECTION INTRAMUSCULAR; INTRAVENOUS at 21:23

## 2025-02-02 ASSESSMENT — LIFESTYLE VARIABLES
HOW OFTEN DO YOU HAVE A DRINK CONTAINING ALCOHOL: NEVER
HOW MANY STANDARD DRINKS CONTAINING ALCOHOL DO YOU HAVE ON A TYPICAL DAY: PATIENT DOES NOT DRINK

## 2025-02-03 ENCOUNTER — APPOINTMENT (OUTPATIENT)
Dept: CT IMAGING | Age: 60
DRG: 871 | End: 2025-02-03
Payer: MEDICARE

## 2025-02-03 LAB
ANION GAP SERPL CALCULATED.3IONS-SCNC: 14 MMOL/L (ref 3–16)
BASOPHILS # BLD: 0.1 K/UL (ref 0–0.2)
BASOPHILS NFR BLD: 0.6 %
BUN SERPL-MCNC: 35 MG/DL (ref 7–20)
CALCIUM SERPL-MCNC: 9.4 MG/DL (ref 8.3–10.6)
CHLORIDE SERPL-SCNC: 103 MMOL/L (ref 99–110)
CO2 SERPL-SCNC: 19 MMOL/L (ref 21–32)
CREAT SERPL-MCNC: 2.1 MG/DL (ref 0.8–1.3)
DEPRECATED RDW RBC AUTO: 17.1 % (ref 12.4–15.4)
EKG ATRIAL RATE: 80 BPM
EKG DIAGNOSIS: NORMAL
EKG P AXIS: 0 DEGREES
EKG P-R INTERVAL: 250 MS
EKG Q-T INTERVAL: 476 MS
EKG QRS DURATION: 156 MS
EKG QTC CALCULATION (BAZETT): 548 MS
EKG R AXIS: 126 DEGREES
EKG T AXIS: -21 DEGREES
EKG VENTRICULAR RATE: 80 BPM
EOSINOPHIL # BLD: 0 K/UL (ref 0–0.6)
EOSINOPHIL NFR BLD: 0 %
EST. AVERAGE GLUCOSE BLD GHB EST-MCNC: 159.9 MG/DL
FERRITIN SERPL IA-MCNC: 405 NG/ML (ref 30–400)
GFR SERPLBLD CREATININE-BSD FMLA CKD-EPI: 35 ML/MIN/{1.73_M2}
GLUCOSE BLD-MCNC: 155 MG/DL (ref 70–99)
GLUCOSE BLD-MCNC: 164 MG/DL (ref 70–99)
GLUCOSE BLD-MCNC: 169 MG/DL (ref 70–99)
GLUCOSE BLD-MCNC: 187 MG/DL (ref 70–99)
GLUCOSE BLD-MCNC: 228 MG/DL (ref 70–99)
GLUCOSE SERPL-MCNC: 228 MG/DL (ref 70–99)
HBA1C MFR BLD: 7.2 %
HCT VFR BLD AUTO: 48.8 % (ref 40.5–52.5)
HGB BLD-MCNC: 16.1 G/DL (ref 13.5–17.5)
IRON SATN MFR SERPL: 9 % (ref 20–50)
IRON SERPL-MCNC: 26 UG/DL (ref 59–158)
LACTATE BLDV-SCNC: 1.9 MMOL/L (ref 0.4–2)
LYMPHOCYTES # BLD: 0.7 K/UL (ref 1–5.1)
LYMPHOCYTES NFR BLD: 4.1 %
MCH RBC QN AUTO: 28.6 PG (ref 26–34)
MCHC RBC AUTO-ENTMCNC: 33 G/DL (ref 31–36)
MCV RBC AUTO: 86.6 FL (ref 80–100)
MONOCYTES # BLD: 0.2 K/UL (ref 0–1.3)
MONOCYTES NFR BLD: 1.5 %
NEUTROPHILS # BLD: 15 K/UL (ref 1.7–7.7)
NEUTROPHILS NFR BLD: 93.8 %
PERFORMED ON: ABNORMAL
PLATELET # BLD AUTO: 193 K/UL (ref 135–450)
PMV BLD AUTO: 7.6 FL (ref 5–10.5)
POTASSIUM SERPL-SCNC: 4.9 MMOL/L (ref 3.5–5.1)
RBC # BLD AUTO: 5.63 M/UL (ref 4.2–5.9)
SODIUM SERPL-SCNC: 136 MMOL/L (ref 136–145)
TIBC SERPL-MCNC: 287 UG/DL (ref 260–445)
TSH SERPL DL<=0.005 MIU/L-ACNC: 2.3 UIU/ML (ref 0.27–4.2)
WBC # BLD AUTO: 16 K/UL (ref 4–11)

## 2025-02-03 PROCEDURE — 2060000000 HC ICU INTERMEDIATE R&B

## 2025-02-03 PROCEDURE — 80048 BASIC METABOLIC PNL TOTAL CA: CPT

## 2025-02-03 PROCEDURE — 83540 ASSAY OF IRON: CPT

## 2025-02-03 PROCEDURE — 85025 COMPLETE CBC W/AUTO DIFF WBC: CPT

## 2025-02-03 PROCEDURE — 84443 ASSAY THYROID STIM HORMONE: CPT

## 2025-02-03 PROCEDURE — 83036 HEMOGLOBIN GLYCOSYLATED A1C: CPT

## 2025-02-03 PROCEDURE — 99223 1ST HOSP IP/OBS HIGH 75: CPT | Performed by: INTERNAL MEDICINE

## 2025-02-03 PROCEDURE — 6370000000 HC RX 637 (ALT 250 FOR IP): Performed by: NURSE PRACTITIONER

## 2025-02-03 PROCEDURE — 94761 N-INVAS EAR/PLS OXIMETRY MLT: CPT

## 2025-02-03 PROCEDURE — 6360000002 HC RX W HCPCS: Performed by: NURSE PRACTITIONER

## 2025-02-03 PROCEDURE — 83605 ASSAY OF LACTIC ACID: CPT

## 2025-02-03 PROCEDURE — 36415 COLL VENOUS BLD VENIPUNCTURE: CPT

## 2025-02-03 PROCEDURE — 82728 ASSAY OF FERRITIN: CPT

## 2025-02-03 PROCEDURE — 2700000000 HC OXYGEN THERAPY PER DAY

## 2025-02-03 PROCEDURE — 6360000004 HC RX CONTRAST MEDICATION: Performed by: NURSE PRACTITIONER

## 2025-02-03 PROCEDURE — 2500000003 HC RX 250 WO HCPCS: Performed by: NURSE PRACTITIONER

## 2025-02-03 PROCEDURE — 83550 IRON BINDING TEST: CPT

## 2025-02-03 PROCEDURE — 71260 CT THORAX DX C+: CPT

## 2025-02-03 PROCEDURE — 93010 ELECTROCARDIOGRAM REPORT: CPT | Performed by: INTERNAL MEDICINE

## 2025-02-03 PROCEDURE — 2580000003 HC RX 258: Performed by: NURSE PRACTITIONER

## 2025-02-03 PROCEDURE — 2500000003 HC RX 250 WO HCPCS: Performed by: INTERNAL MEDICINE

## 2025-02-03 PROCEDURE — 94660 CPAP INITIATION&MGMT: CPT

## 2025-02-03 RX ORDER — PAROXETINE HYDROCHLORIDE HEMIHYDRATE 25 MG/1
25 TABLET, FILM COATED, EXTENDED RELEASE ORAL DAILY
COMMUNITY
Start: 2024-12-04

## 2025-02-03 RX ORDER — METOPROLOL SUCCINATE 50 MG/1
1 TABLET, EXTENDED RELEASE ORAL DAILY
COMMUNITY
Start: 2024-08-20 | End: 2025-08-20

## 2025-02-03 RX ORDER — CARVEDILOL 6.25 MG/1
12.5 TABLET ORAL 2 TIMES DAILY WITH MEALS
Status: DISCONTINUED | OUTPATIENT
Start: 2025-02-03 | End: 2025-02-03

## 2025-02-03 RX ORDER — ATORVASTATIN CALCIUM 40 MG/1
40 TABLET, FILM COATED ORAL NIGHTLY
Status: DISCONTINUED | OUTPATIENT
Start: 2025-02-03 | End: 2025-02-07 | Stop reason: HOSPADM

## 2025-02-03 RX ORDER — ALBUTEROL SULFATE 90 UG/1
2 INHALANT RESPIRATORY (INHALATION) EVERY 4 HOURS PRN
Status: DISCONTINUED | OUTPATIENT
Start: 2025-02-03 | End: 2025-02-07 | Stop reason: HOSPADM

## 2025-02-03 RX ORDER — METOPROLOL SUCCINATE 50 MG/1
50 TABLET, EXTENDED RELEASE ORAL DAILY
Status: DISCONTINUED | OUTPATIENT
Start: 2025-02-03 | End: 2025-02-07 | Stop reason: HOSPADM

## 2025-02-03 RX ORDER — GLUCAGON 1 MG/ML
1 KIT INJECTION PRN
Status: DISCONTINUED | OUTPATIENT
Start: 2025-02-03 | End: 2025-02-07 | Stop reason: HOSPADM

## 2025-02-03 RX ORDER — MONTELUKAST SODIUM 10 MG/1
10 TABLET ORAL NIGHTLY
Status: DISCONTINUED | OUTPATIENT
Start: 2025-02-03 | End: 2025-02-07 | Stop reason: HOSPADM

## 2025-02-03 RX ORDER — AMIODARONE HYDROCHLORIDE 200 MG/1
400 TABLET ORAL 2 TIMES DAILY
Status: DISCONTINUED | OUTPATIENT
Start: 2025-02-03 | End: 2025-02-07 | Stop reason: HOSPADM

## 2025-02-03 RX ORDER — AMIODARONE HYDROCHLORIDE 200 MG/1
400 TABLET ORAL 2 TIMES DAILY
COMMUNITY

## 2025-02-03 RX ORDER — BUSPIRONE HYDROCHLORIDE 5 MG/1
5 TABLET ORAL 3 TIMES DAILY
COMMUNITY
Start: 2024-08-14

## 2025-02-03 RX ORDER — LORAZEPAM 1 MG/1
1 TABLET ORAL 3 TIMES DAILY PRN
COMMUNITY

## 2025-02-03 RX ORDER — LORAZEPAM 1 MG/1
1 TABLET ORAL 3 TIMES DAILY PRN
Status: DISCONTINUED | OUTPATIENT
Start: 2025-02-03 | End: 2025-02-07 | Stop reason: HOSPADM

## 2025-02-03 RX ORDER — PAROXETINE 20 MG/1
20 TABLET, FILM COATED ORAL DAILY
Status: DISCONTINUED | OUTPATIENT
Start: 2025-02-03 | End: 2025-02-07 | Stop reason: HOSPADM

## 2025-02-03 RX ORDER — FUROSEMIDE 10 MG/ML
40 INJECTION INTRAMUSCULAR; INTRAVENOUS 2 TIMES DAILY
Status: DISCONTINUED | OUTPATIENT
Start: 2025-02-03 | End: 2025-02-06

## 2025-02-03 RX ORDER — DEXTROSE MONOHYDRATE 100 MG/ML
INJECTION, SOLUTION INTRAVENOUS CONTINUOUS PRN
Status: DISCONTINUED | OUTPATIENT
Start: 2025-02-03 | End: 2025-02-07 | Stop reason: HOSPADM

## 2025-02-03 RX ADMIN — LORAZEPAM 1 MG: 1 TABLET ORAL at 13:23

## 2025-02-03 RX ADMIN — WATER 1000 MG: 1 INJECTION INTRAMUSCULAR; INTRAVENOUS; SUBCUTANEOUS at 01:40

## 2025-02-03 RX ADMIN — APIXABAN 5 MG: 5 TABLET, FILM COATED ORAL at 10:30

## 2025-02-03 RX ADMIN — INSULIN LISPRO 2 UNITS: 100 INJECTION, SOLUTION INTRAVENOUS; SUBCUTANEOUS at 09:20

## 2025-02-03 RX ADMIN — PAROXETINE HYDROCHLORIDE 20 MG: 20 TABLET, FILM COATED ORAL at 10:30

## 2025-02-03 RX ADMIN — ATORVASTATIN CALCIUM 40 MG: 40 TABLET, FILM COATED ORAL at 20:38

## 2025-02-03 RX ADMIN — METOPROLOL SUCCINATE 50 MG: 50 TABLET, EXTENDED RELEASE ORAL at 10:31

## 2025-02-03 RX ADMIN — FUROSEMIDE 40 MG: 10 INJECTION, SOLUTION INTRAMUSCULAR; INTRAVENOUS at 09:20

## 2025-02-03 RX ADMIN — SODIUM CHLORIDE, PRESERVATIVE FREE 10 ML: 5 INJECTION INTRAVENOUS at 09:21

## 2025-02-03 RX ADMIN — AZITHROMYCIN MONOHYDRATE 500 MG: 500 INJECTION, POWDER, LYOPHILIZED, FOR SOLUTION INTRAVENOUS at 01:53

## 2025-02-03 RX ADMIN — SODIUM CHLORIDE, PRESERVATIVE FREE 10 ML: 5 INJECTION INTRAVENOUS at 20:37

## 2025-02-03 RX ADMIN — MONTELUKAST SODIUM 10 MG: 10 TABLET, COATED ORAL at 20:38

## 2025-02-03 RX ADMIN — SACUBITRIL AND VALSARTAN 1 TABLET: 49; 51 TABLET, FILM COATED ORAL at 20:37

## 2025-02-03 RX ADMIN — AMIODARONE HYDROCHLORIDE 400 MG: 200 TABLET ORAL at 10:31

## 2025-02-03 RX ADMIN — IOPAMIDOL 75 ML: 755 INJECTION, SOLUTION INTRAVENOUS at 00:17

## 2025-02-03 RX ADMIN — SACUBITRIL AND VALSARTAN 1 TABLET: 49; 51 TABLET, FILM COATED ORAL at 10:30

## 2025-02-03 RX ADMIN — MICONAZOLE NITRATE: 2 POWDER TOPICAL at 20:41

## 2025-02-03 RX ADMIN — FUROSEMIDE 40 MG: 10 INJECTION, SOLUTION INTRAMUSCULAR; INTRAVENOUS at 18:18

## 2025-02-03 RX ADMIN — APIXABAN 5 MG: 5 TABLET, FILM COATED ORAL at 20:38

## 2025-02-03 RX ADMIN — INSULIN LISPRO 2 UNITS: 100 INJECTION, SOLUTION INTRAVENOUS; SUBCUTANEOUS at 04:46

## 2025-02-03 RX ADMIN — SODIUM CHLORIDE, PRESERVATIVE FREE 10 ML: 5 INJECTION INTRAVENOUS at 01:55

## 2025-02-03 RX ADMIN — AMIODARONE HYDROCHLORIDE 400 MG: 200 TABLET ORAL at 20:37

## 2025-02-03 NOTE — DISCHARGE INSTRUCTIONS
Heart Failure Resources:  Heart Failure Interactive Workbook:  Go to [email or web address]/publication/?q=762998 for a Free Heart Failure Interactive Workbook provided by The American Heart Association. This interactive workbook will provide information on Healthier Living with Heart Failure. Please copy and paste link into search bar. Use your mouse to scroll through the pages.    HF [place] any:   Heart Failure Free smart phone any available for iPhone and Android download. Use your phone to track sodium intake, fluid intake, symptoms, and weight.     Low Sodium Diet / Recipes:  Go to www.[employer].Websense website for “renal” diet which is Low Sodium! [employer] is a dialysis company, but this website offers free seasonal cookbooks. Each quarter, they will release 25-30 new recipes with a breakdown of calories, sodium, and glucose. You can also go to www.[employer]/recipes website for free recipes.     Home Exercise Program:   Identification of Green/Yellow/Red zones:  You should be able to identify when you feel good (green zone), if you have 1-2 symptoms of HF (yellow zone), or if you are in need of medical attention (red zone).  In your CHF education folder you were provided a “stop light tool” to outline this information.     We want to you to rate your exertion levels:    Our therapy team has discussed means of identification with you such as the \"Elisabeth scale.\"  The Elisabeth rating scale ranges from 6 to 20, where 6 means \"no exertion at all\" and 20 means \"maximal exertion.\" The goal is to use this to gauge how much effort it is taking for you to do your normal daily tasks.   You should be able to recognize when too much exertion is being expended.    Elements of Energy Conservation:   Prioritize/Plan: Decide what needs to be done today, and what can wait for a later date, write to do lists, plan ahead to avoid extra trips, and gather supplies and equipment needed before starting an activity.   Position:

## 2025-02-03 NOTE — PLAN OF CARE
Problem: Discharge Planning  Goal: Discharge to home or other facility with appropriate resources  Outcome: Not Progressing  Flowsheets (Taken 2/3/2025 0251)  Discharge to home or other facility with appropriate resources:   Arrange for needed discharge resources and transportation as appropriate   Identify discharge learning needs (meds, wound care, etc)

## 2025-02-03 NOTE — CARE COORDINATION
Toileting    PT AM-PAC:   /24  OT AM-PAC:   /24    Family can provide assistance at DC: Yes  Would you like Case Management to discuss the discharge plan with any other family members/significant others, and if so, who? No  Plans to Return to Present Housing: Unknown at present  Other Identified Issues/Barriers to RETURNING to current housing: None  Potential Assistance needed at discharge: N/A            Potential DME:    Patient expects to discharge to: House  Plan for transportation at discharge: Family    Financial    Payor: [employer] MEDICARE / Plan: [employer] GOLD PLUS HMO / Product Type: *No Product type* /     Does insurance require precert for SNF: Yes    Potential assistance Purchasing Medications: No  Meds-to-Beds request:        [facility] - [place], [place] - [address] - P [phone] - F [phone]  [address]  [facility] [address]  Phone: [phone] Fax: [phone]    [facility] - [place], [place] - [address] - P [phone] - F [phone]  [address]  [facility] [address]  Phone: [phone] Fax: [phone]      Notes:    Factors facilitating achievement of predicted outcomes: Family support, Motivated, Cooperative, and Pleasant    Barriers to discharge: Decreased endurance and Medical complications    Additional Case Management Notes: IPTA; Lives in ranch home with SO. Uses CPAP and has an O2 concentrator privately purchased no company attached to it. No needs at this time Will follow    The Plan for Transition of Care is related to the following treatment goals of Acute respiratory failure with hypoxia [J96.01]  Acute on chronic congestive heart failure, unspecified heart failure type (HCC) [I50.9]  Congestive heart failure, unspecified HF chronicity, unspecified heart failure type (HCC) [I50.9]  Acute hypoxic respiratory failure [J96.01]    IF APPLICABLE: The Patient and/or patient representative Dinesh and his family

## 2025-02-03 NOTE — H&P
Hospital Medicine History & Physical      Date of Admission: 2/2/2025    Date of Service:  Pt seen/examined on 2/2/2025     [x]Admitted to Inpatient with expected LOS greater than two midnights due to medical therapy.    []Placed in Observation status.    Chief Admission Complaint:  Shortness of breath    Presenting Admission History:      59 y.o. male, with PMH of obesity, DM, CHF, HTN, HLD atrial fibrillation, who presented to [facility] with shortness of breath. History obtained from the patient and review of EMR.  Patient stated around 8 PM he had a sudden onset of shortness of breath.  He stated at home his SpO2 was 72% on room air.  EMS was called and the patient was found to be 84% on room air and given a DuoNeb treatment as well as albuterol.  The patient was placed on CPAP at that time and brought to the emergency department for further evaluation. In the emergency department a chest x-ray was obtained that revealed mild cardiomegaly and pulmonary vascular congestion.  Right lower lobe airspace disease consistent with underlying atelectasis and/or pneumonia.  Patient's SpO2 was 86% on room air and he was placed on BiPAP.  He was given Solu-Medrol, 60 mg of IV Lasix as well as a DuoNeb treatment.  The patient's proBNP was found to be 2536.  Upon further evaluation, the patient did meet sepsis criteria with a WBC 18.6, lactic 4.9 and respiratory rate of 24.  The patient was not given IV fluids due to fluid overload.  Blood cultures were obtained and the patient was started on azithromycin and ceftriaxone.  The patient was also found to have an elevated troponin of 29 which is likely secondary to CKD.  Patient denies any chest pain at this time.  He was admitted for further evaluation and treatment. The patient denied any other associated symptoms as well as any aggravating and/or alleviating factors. At the time of this assessment, the patient was resting comfortably in bed. He currently denies

## 2025-02-03 NOTE — PLAN OF CARE
Problem: Discharge Planning  Goal: Discharge to home or other facility with appropriate resources  2/3/2025 1853 by Shea Recio, RN  Outcome: Progressing     Problem: Safety - Adult  Goal: Free from fall injury  2/3/2025 1853 by Shea Recio, RN  Outcome: Progressing     Problem: ABCDS Injury Assessment  Goal: Absence of physical injury  2/3/2025 1853 by Shea Recio, RN  Outcome: Progressing

## 2025-02-03 NOTE — PLAN OF CARE
CHF Care Plan      Patient's EF (Ejection Fraction) is greater than 40%    Heart Failure Medications:  Diuretics:: Furosemide    (One of the following REQUIRED for EF </= 40%/SYSTOLIC FAILURE but MAY be used in EF% >40%/DIASTOLIC FAILURE)        ACE:: None        ARB:: Valsartan        ARNI:: Sacubitril/Valsartan-Entresto    (Beta Blockers)  NON- Evidenced Based Beta Blocker (for EF% >40%/DIASTOLIC FAILURE): None    Evidenced Based Beta Blocker::(REQUIRED for EF% <40%/SYSTOLIC FAILURE) Metoprolol SUCCinate- Toprol XL  ...................................................................................................................................................    Failed to redirect to the Timeline version of the [employer] SmartLink.      Patient's weights and intake/output reviewed    Daily Weight log at bedside, patient/family participation in use of log: \"yes    Patient's current weight today shows a difference of 3 lbs less than last documented weight.      Intake/Output Summary (Last 24 hours) at 2/3/2025 0641  Last data filed at 2/3/2025 0503  Gross per 24 hour   Intake 246.3 ml   Output 800 ml   Net -553.7 ml       Education Booklet Provided: yes    Comorbidities Reviewed Yes    Patient has a past medical history of Asthma, CAD (coronary artery disease), CHF (congestive heart failure) (HCC), Hyperlipidemia, Hypertension, and SINDY (obstructive sleep apnea).     >>For CHF and Comorbidity documentation on Education Time and Topics, please see Education Tab      CHF Education    Learners:  Patient and Significant Other  Readineess:   Eager, Acceptance, Nonacceptance, and Refuses  Method:   Explanation, Demonstration, and Handout  Response:    Verbalizes Understanding, Demonstrated Understanding, and Needs Reinforcement    Comments: reviewed with patient about mediations, chronic conditions and daily weights    Time Spent: 10 minutes      Pt resting in bed at this time on BiPAP. Pt denies shortness of breath. Pt

## 2025-02-03 NOTE — CONSULTS
CARDIOLOGY CONSULTATION        Patient Name: Dinesh Partida  Date of admission: 2/2/2025  9:15 PM  Admission Dx: Acute respiratory failure with hypoxia [J96.01]  Acute on chronic congestive heart failure, unspecified heart failure type (HCC) [I50.9]  Congestive heart failure, unspecified HF chronicity, unspecified heart failure type (HCC) [I50.9]  Acute hypoxic respiratory failure [J96.01]  Requesting Physician: Halima Doyle MD  Primary Care physician: Zee Patel MD    Reason for Consultation/Chief Complaint: Congestive heart failure     History of Present Illness:     Dinesh Partida is a 59 y.o. patient with a prior medical history notable for obesity, diabetes mellitus, hypertension, hyperlipidemia, LBBB, VT, atrial fibrillation, ischemic cardiomyopathy with severe LV dysfunction, status post CRT-D with LV lead upgrade 10/2021 ([employer]) and congestive heart failure with reduced EF, who presented to the hospital with complaints of shortness of breath.    Patient follows with [facility] cardiology.  Last evaluation was in August at which time the patient was noted to have stopped amiodarone due to perceived irregular heartbeats.  He had undergone VT ablation in April 2024.  Noted taking Coreg/Jardiance/Entresto high-dose at that time.  Coreg was changed to metoprolol due to fatigue.  Aspirin and Eliquis for antithrombotic therapy.  Noted myalgias with Crestor previously.  He was maintained on Lipitor.    Unfortunately in October following change from carvedilol to metoprolol the patient was admitted for AICD shocks from [facility] to [facility].  He was shocked 60 times in total for combination VF/VT.  Resolution of wide-complex tachycardia with amiodarone.  Elevated troponins were felt secondary to AICD fires.  He was also treated for pneumonia    ED course/Vital signs on presentation: Patient endorsed sudden onset shortness of breath at home the left in the ED.  On presentation, hypertensive,

## 2025-02-03 NOTE — ED PROVIDER NOTES
EMERGENCY DEPARTMENT ENCOUNTER     [facility] EMERGENCY DEPARTMENT     Pt Name: Dinesh Partida   MRN: [id number]   Birthdate 1965   Date of evaluation: 2/2/2025   Provider: Merlene Foster MD   PCP: Zee Patel MD   Note Started: 9:38 PM EST 2/2/25     CHIEF COMPLAINT:     Chief Complaint   Patient presents with    Respiratory Distress     Patient arrives to ED with EMS for sudden onset shortness of breath. Spo2 on room air 72%. Patient given Duoneb and albuterol treatment by EMS. 84% on CPAP with EMS. Patient has hx of COPD and CHF.        HISTORY OF PRESENT ILLNESS:  Adult male who comes in with acute onset of shortness of breath that started about an hour and a half prior to arrival.  Patient also has been having productive cough with bloody sputum.  EMS noticed that when they got to the patient's house he was saturating in the 70s.  He was given a DuoNeb and albuterol treatment which he said helped.  He was placed on nonrebreather with no significant improvement in his oxygen he was placed on CPAP and his oxygen increased to the low 80s.  Patient has a history of both COPD and CHF.  History is obtained from the EMS because the patient is in severe respiratory distress.  Patient noted that he had not had a fever and he shook his head that he did not have any chest pain.    PHYSICAL EXAM:    ED Triage Vitals [02/02/25 2118]   BP Systolic BP Percentile Diastolic BP Percentile Temp Temp Source Pulse Respirations SpO2   (!) 137/90 -- -- 98.3 °F (36.8 °C) Axillary 96 (!) 36 (!) 86 %      Height Weight         -- --              Physical Exam  Vitals and nursing note reviewed.   Constitutional:       General: He is in acute distress.      Appearance: Normal appearance. He is well-developed. He is obese. He is diaphoretic. He is not ill-appearing.   HENT:      Head: Normocephalic and atraumatic.      Right Ear: External ear normal.      Left Ear: External ear normal.      Nose: Nose normal.

## 2025-02-03 NOTE — PLAN OF CARE
Problem: Discharge Planning  Goal: Discharge to home or other facility with appropriate resources  Outcome: Not Progressing  Flowsheets (Taken 2/3/2025 0251)  Discharge to home or other facility with appropriate resources:   Arrange for needed discharge resources and transportation as appropriate   Identify discharge learning needs (meds, wound care, etc)     Problem: Chronic Conditions and Co-morbidities  Goal: Patient's chronic conditions and co-morbidity symptoms are monitored and maintained or improved  Outcome: Not Progressing

## 2025-02-03 NOTE — RT PROTOCOL NOTE
RT Inhaler-Nebulizer Bronchodilator Protocol Note    There is a bronchodilator order in the chart from a provider indicating to follow the RT Bronchodilator Protocol and there is an “Initiate RT Inhaler-Nebulizer Bronchodilator Protocol” order as well (see protocol at bottom of note).    CXR Findings:  XR CHEST PORTABLE    Result Date: 2/2/2025  1. Mild cardiomegaly with pulmonary vascular congestion. 2. Right lower lobe airspace disease consistent with underlying atelectasis and/or pneumonia. Correlate clinically. Electronically signed by Rom Hanna MD      The findings from the last RT Protocol Assessment were as follows:   History Pulmonary Disease: Chronic pulmonary disease  Respiratory Pattern: Regular pattern and RR 12-20 bpm  Breath Sounds: Slightly diminished and/or crackles  Cough: Strong, spontaneous, non-productive  Indication for Bronchodilator Therapy: None  Bronchodilator Assessment Score: 4    Aerosolized bronchodilator medication orders have been revised according to the RT Inhaler-Nebulizer Bronchodilator Protocol below.    Respiratory Therapist to perform RT Therapy Protocol Assessment initially then follow the protocol.  Repeat RT Therapy Protocol Assessment PRN for score 0-3 or on second treatment, BID, and PRN for scores above 3.    No Indications - adjust the frequency to every 6 hours PRN wheezing or bronchospasm, if no treatments needed after 48 hours then discontinue using Per Protocol order mode.     If indication present, adjust the RT bronchodilator orders based on the Bronchodilator Assessment Score as indicated below.  Use Inhaler orders unless patient has one or more of the following: on home nebulizer, not able to hold breath for 10 seconds, is not alert and oriented, cannot activate and use MDI correctly, or respiratory rate 25 breaths per minute or more, then use the equivalent nebulizer order(s) with same Frequency and PRN reasons based on the score.  If a patient is on this

## 2025-02-04 LAB
ALBUMIN SERPL-MCNC: 3.7 G/DL (ref 3.4–5)
ALP SERPL-CCNC: 82 U/L (ref 40–129)
ALT SERPL-CCNC: 221 U/L (ref 10–40)
ANION GAP SERPL CALCULATED.3IONS-SCNC: 11 MMOL/L (ref 3–16)
AST SERPL-CCNC: 87 U/L (ref 15–37)
BILIRUB DIRECT SERPL-MCNC: 0.2 MG/DL (ref 0–0.3)
BILIRUB INDIRECT SERPL-MCNC: 0.2 MG/DL (ref 0–1)
BILIRUB SERPL-MCNC: 0.4 MG/DL (ref 0–1)
BUN SERPL-MCNC: 47 MG/DL (ref 7–20)
CALCIUM SERPL-MCNC: 9.4 MG/DL (ref 8.3–10.6)
CHLORIDE SERPL-SCNC: 102 MMOL/L (ref 99–110)
CO2 SERPL-SCNC: 23 MMOL/L (ref 21–32)
CREAT SERPL-MCNC: 1.9 MG/DL (ref 0.8–1.3)
DEPRECATED RDW RBC AUTO: 17.3 % (ref 12.4–15.4)
GFR SERPLBLD CREATININE-BSD FMLA CKD-EPI: 39 ML/MIN/{1.73_M2}
GLUCOSE BLD-MCNC: 139 MG/DL (ref 70–99)
GLUCOSE BLD-MCNC: 153 MG/DL (ref 70–99)
GLUCOSE BLD-MCNC: 155 MG/DL (ref 70–99)
GLUCOSE BLD-MCNC: 156 MG/DL (ref 70–99)
GLUCOSE BLD-MCNC: 208 MG/DL (ref 70–99)
GLUCOSE SERPL-MCNC: 175 MG/DL (ref 70–99)
HCT VFR BLD AUTO: 45.6 % (ref 40.5–52.5)
HGB BLD-MCNC: 15 G/DL (ref 13.5–17.5)
MCH RBC QN AUTO: 28.5 PG (ref 26–34)
MCHC RBC AUTO-ENTMCNC: 33 G/DL (ref 31–36)
MCV RBC AUTO: 86.4 FL (ref 80–100)
NT-PROBNP SERPL-MCNC: 1227 PG/ML (ref 0–124)
PATH INTERP BLD-IMP: NORMAL
PERFORMED ON: ABNORMAL
PLATELET # BLD AUTO: 209 K/UL (ref 135–450)
PMV BLD AUTO: 8 FL (ref 5–10.5)
POTASSIUM SERPL-SCNC: 4.4 MMOL/L (ref 3.5–5.1)
PROT SERPL-MCNC: 6.8 G/DL (ref 6.4–8.2)
RBC # BLD AUTO: 5.28 M/UL (ref 4.2–5.9)
SODIUM SERPL-SCNC: 136 MMOL/L (ref 136–145)
WBC # BLD AUTO: 14.1 K/UL (ref 4–11)

## 2025-02-04 PROCEDURE — 80076 HEPATIC FUNCTION PANEL: CPT

## 2025-02-04 PROCEDURE — 99233 SBSQ HOSP IP/OBS HIGH 50: CPT | Performed by: NURSE PRACTITIONER

## 2025-02-04 PROCEDURE — 6360000002 HC RX W HCPCS: Performed by: NURSE PRACTITIONER

## 2025-02-04 PROCEDURE — 2580000003 HC RX 258: Performed by: NURSE PRACTITIONER

## 2025-02-04 PROCEDURE — 2700000000 HC OXYGEN THERAPY PER DAY

## 2025-02-04 PROCEDURE — 6370000000 HC RX 637 (ALT 250 FOR IP): Performed by: NURSE PRACTITIONER

## 2025-02-04 PROCEDURE — 99223 1ST HOSP IP/OBS HIGH 75: CPT | Performed by: INTERNAL MEDICINE

## 2025-02-04 PROCEDURE — 94660 CPAP INITIATION&MGMT: CPT

## 2025-02-04 PROCEDURE — 2500000003 HC RX 250 WO HCPCS: Performed by: INTERNAL MEDICINE

## 2025-02-04 PROCEDURE — 2060000000 HC ICU INTERMEDIATE R&B

## 2025-02-04 PROCEDURE — 83880 ASSAY OF NATRIURETIC PEPTIDE: CPT

## 2025-02-04 PROCEDURE — 6370000000 HC RX 637 (ALT 250 FOR IP): Performed by: INTERNAL MEDICINE

## 2025-02-04 PROCEDURE — 2500000003 HC RX 250 WO HCPCS: Performed by: NURSE PRACTITIONER

## 2025-02-04 PROCEDURE — 80048 BASIC METABOLIC PNL TOTAL CA: CPT

## 2025-02-04 PROCEDURE — 36415 COLL VENOUS BLD VENIPUNCTURE: CPT

## 2025-02-04 PROCEDURE — 94761 N-INVAS EAR/PLS OXIMETRY MLT: CPT

## 2025-02-04 PROCEDURE — 85027 COMPLETE CBC AUTOMATED: CPT

## 2025-02-04 RX ORDER — SODIUM CHLORIDE 9 MG/ML
INJECTION, SOLUTION INTRAVENOUS
Status: DISCONTINUED
Start: 2025-02-04 | End: 2025-02-04 | Stop reason: SDUPTHER

## 2025-02-04 RX ORDER — BUDESONIDE AND FORMOTEROL FUMARATE DIHYDRATE 160; 4.5 UG/1; UG/1
2 AEROSOL RESPIRATORY (INHALATION)
Status: DISCONTINUED | OUTPATIENT
Start: 2025-02-04 | End: 2025-02-07 | Stop reason: HOSPADM

## 2025-02-04 RX ADMIN — LORAZEPAM 1 MG: 1 TABLET ORAL at 21:06

## 2025-02-04 RX ADMIN — SODIUM CHLORIDE, PRESERVATIVE FREE 10 ML: 5 INJECTION INTRAVENOUS at 09:59

## 2025-02-04 RX ADMIN — AMIODARONE HYDROCHLORIDE 400 MG: 200 TABLET ORAL at 09:55

## 2025-02-04 RX ADMIN — LORAZEPAM 1 MG: 1 TABLET ORAL at 10:02

## 2025-02-04 RX ADMIN — FUROSEMIDE 40 MG: 10 INJECTION, SOLUTION INTRAMUSCULAR; INTRAVENOUS at 17:16

## 2025-02-04 RX ADMIN — MICONAZOLE NITRATE: 2 POWDER TOPICAL at 22:00

## 2025-02-04 RX ADMIN — PAROXETINE HYDROCHLORIDE 20 MG: 20 TABLET, FILM COATED ORAL at 09:55

## 2025-02-04 RX ADMIN — SACUBITRIL AND VALSARTAN 1 TABLET: 49; 51 TABLET, FILM COATED ORAL at 09:55

## 2025-02-04 RX ADMIN — MICONAZOLE NITRATE: 2 POWDER TOPICAL at 09:59

## 2025-02-04 RX ADMIN — AZITHROMYCIN MONOHYDRATE 500 MG: 500 INJECTION, POWDER, LYOPHILIZED, FOR SOLUTION INTRAVENOUS at 01:37

## 2025-02-04 RX ADMIN — WATER 1000 MG: 1 INJECTION INTRAMUSCULAR; INTRAVENOUS; SUBCUTANEOUS at 01:32

## 2025-02-04 RX ADMIN — SACUBITRIL AND VALSARTAN 1 TABLET: 49; 51 TABLET, FILM COATED ORAL at 21:06

## 2025-02-04 RX ADMIN — SODIUM CHLORIDE, PRESERVATIVE FREE 10 ML: 5 INJECTION INTRAVENOUS at 22:00

## 2025-02-04 RX ADMIN — MONTELUKAST SODIUM 10 MG: 10 TABLET, COATED ORAL at 21:05

## 2025-02-04 RX ADMIN — FUROSEMIDE 40 MG: 10 INJECTION, SOLUTION INTRAMUSCULAR; INTRAVENOUS at 09:55

## 2025-02-04 RX ADMIN — METOPROLOL SUCCINATE 50 MG: 50 TABLET, EXTENDED RELEASE ORAL at 09:55

## 2025-02-04 RX ADMIN — AMIODARONE HYDROCHLORIDE 400 MG: 200 TABLET ORAL at 21:05

## 2025-02-04 RX ADMIN — INSULIN LISPRO 2 UNITS: 100 INJECTION, SOLUTION INTRAVENOUS; SUBCUTANEOUS at 17:15

## 2025-02-04 NOTE — PLAN OF CARE
Problem: Discharge Planning  Goal: Discharge to home or other facility with appropriate resources  2/4/2025 0420 by Ban Jack RN  Outcome: Progressing     Problem: Safety - Adult  Goal: Free from fall injury  2/4/2025 0420 by Ban Jack RN  Outcome: Progressing     Problem: ABCDS Injury Assessment  Goal: Absence of physical injury  2/4/2025 0420 by Ban Jack RN  Outcome: Progressing     Problem: Chronic Conditions and Co-morbidities  Goal: Patient's chronic conditions and co-morbidity symptoms are monitored and maintained or improved  Outcome: Progressing

## 2025-02-04 NOTE — CONSULTS
REASON FOR CONSULTATION/CC: Hemoptysis      Consult at request of Ryan Sagastume MD     PCP: Zee Patel MD  Established Pulmonologist: RAJNI Landers    Chief Complaint   Patient presents with    Respiratory Distress     Patient arrives to ED with EMS for sudden onset shortness of breath. Spo2 on room air 72%. Patient given Duoneb and albuterol treatment by EMS. 84% on CPAP with EMS. Patient has hx of COPD and CHF.       HISTORY OF PRESENT ILLNESS: Dinesh Partida is a 59 y.o. year old male with a history of centrilobular emphysema, SINDY on CPAP, who presents with shortness of breath concern for possible acute exacerbations of CHF and COPD.  Patient also developed hemoptysis of small-volume.  Is on Eliquis chronically and is now on hold.      Past Medical History:   Diagnosis Date    Asthma     CAD (coronary artery disease)     CHF (congestive heart failure) (HCC)     Hyperlipidemia     Hypertension     SINDY (obstructive sleep apnea)     wears 2L with cpap, no other oxygen during the day         Past Surgical History:   Procedure Laterality Date    CARDIAC ELECTROPHYSIOLOGY STUDY AND ABLATION      had for afib    PACEMAKER INSERTION Left        Family Hx  family history is not on file.  Family history reviewed with patient, pertinent positive in HPI  Social Hx   reports that he quit smoking about 7 years ago. His smoking use included cigarettes. He started smoking about 42 years ago. He has a 105 pack-year smoking history. He has never used smokeless tobacco.    Scheduled Meds:   [Held by provider] apixaban  5 mg Oral BID    [Held by provider] atorvastatin  40 mg Oral Nightly    sacubitril-valsartan  1 tablet Oral BID    montelukast  10 mg Oral Nightly    furosemide  40 mg IntraVENous BID    cefTRIAXone (ROCEPHIN) IV  1,000 mg IntraVENous Q24H    And    azithromycin  500 mg IntraVENous Q24H    amiodarone  400 mg Oral BID    metoprolol succinate  50 mg Oral Daily    PARoxetine  20 mg Oral Daily    miconazole

## 2025-02-05 ENCOUNTER — APPOINTMENT (OUTPATIENT)
Dept: ULTRASOUND IMAGING | Age: 60
DRG: 871 | End: 2025-02-05
Payer: MEDICARE

## 2025-02-05 LAB
ALBUMIN SERPL-MCNC: 3.7 G/DL (ref 3.4–5)
ALP SERPL-CCNC: 86 U/L (ref 40–129)
ALT SERPL-CCNC: 268 U/L (ref 10–40)
ANION GAP SERPL CALCULATED.3IONS-SCNC: 13 MMOL/L (ref 3–16)
AST SERPL-CCNC: 102 U/L (ref 15–37)
BILIRUB DIRECT SERPL-MCNC: 0.2 MG/DL (ref 0–0.3)
BILIRUB INDIRECT SERPL-MCNC: 0.2 MG/DL (ref 0–1)
BILIRUB SERPL-MCNC: 0.4 MG/DL (ref 0–1)
BUN SERPL-MCNC: 51 MG/DL (ref 7–20)
CALCIUM SERPL-MCNC: 9.3 MG/DL (ref 8.3–10.6)
CHLORIDE SERPL-SCNC: 101 MMOL/L (ref 99–110)
CO2 SERPL-SCNC: 25 MMOL/L (ref 21–32)
CREAT SERPL-MCNC: 1.9 MG/DL (ref 0.8–1.3)
GFR SERPLBLD CREATININE-BSD FMLA CKD-EPI: 39 ML/MIN/{1.73_M2}
GLUCOSE BLD-MCNC: 136 MG/DL (ref 70–99)
GLUCOSE BLD-MCNC: 152 MG/DL (ref 70–99)
GLUCOSE BLD-MCNC: 171 MG/DL (ref 70–99)
GLUCOSE BLD-MCNC: 211 MG/DL (ref 70–99)
GLUCOSE SERPL-MCNC: 176 MG/DL (ref 70–99)
PERFORMED ON: ABNORMAL
POTASSIUM SERPL-SCNC: 4.4 MMOL/L (ref 3.5–5.1)
PROT SERPL-MCNC: 7 G/DL (ref 6.4–8.2)
SODIUM SERPL-SCNC: 139 MMOL/L (ref 136–145)

## 2025-02-05 PROCEDURE — 2500000003 HC RX 250 WO HCPCS: Performed by: NURSE PRACTITIONER

## 2025-02-05 PROCEDURE — 97162 PT EVAL MOD COMPLEX 30 MIN: CPT

## 2025-02-05 PROCEDURE — 97116 GAIT TRAINING THERAPY: CPT

## 2025-02-05 PROCEDURE — 97166 OT EVAL MOD COMPLEX 45 MIN: CPT

## 2025-02-05 PROCEDURE — 99232 SBSQ HOSP IP/OBS MODERATE 35: CPT | Performed by: NURSE PRACTITIONER

## 2025-02-05 PROCEDURE — 94660 CPAP INITIATION&MGMT: CPT

## 2025-02-05 PROCEDURE — 36415 COLL VENOUS BLD VENIPUNCTURE: CPT

## 2025-02-05 PROCEDURE — 2060000000 HC ICU INTERMEDIATE R&B

## 2025-02-05 PROCEDURE — 80076 HEPATIC FUNCTION PANEL: CPT

## 2025-02-05 PROCEDURE — 6360000002 HC RX W HCPCS: Performed by: NURSE PRACTITIONER

## 2025-02-05 PROCEDURE — 94761 N-INVAS EAR/PLS OXIMETRY MLT: CPT

## 2025-02-05 PROCEDURE — 80074 ACUTE HEPATITIS PANEL: CPT

## 2025-02-05 PROCEDURE — 76705 ECHO EXAM OF ABDOMEN: CPT

## 2025-02-05 PROCEDURE — 80048 BASIC METABOLIC PNL TOTAL CA: CPT

## 2025-02-05 PROCEDURE — 2700000000 HC OXYGEN THERAPY PER DAY

## 2025-02-05 PROCEDURE — 97530 THERAPEUTIC ACTIVITIES: CPT

## 2025-02-05 PROCEDURE — 2580000003 HC RX 258: Performed by: NURSE PRACTITIONER

## 2025-02-05 PROCEDURE — 6370000000 HC RX 637 (ALT 250 FOR IP): Performed by: NURSE PRACTITIONER

## 2025-02-05 PROCEDURE — 97535 SELF CARE MNGMENT TRAINING: CPT

## 2025-02-05 RX ORDER — SPIRONOLACTONE 25 MG/1
12.5 TABLET ORAL DAILY
Status: DISCONTINUED | OUTPATIENT
Start: 2025-02-05 | End: 2025-02-07 | Stop reason: HOSPADM

## 2025-02-05 RX ADMIN — MONTELUKAST SODIUM 10 MG: 10 TABLET, COATED ORAL at 20:47

## 2025-02-05 RX ADMIN — SODIUM CHLORIDE, PRESERVATIVE FREE 10 ML: 5 INJECTION INTRAVENOUS at 20:55

## 2025-02-05 RX ADMIN — MICONAZOLE NITRATE: 2 POWDER TOPICAL at 20:50

## 2025-02-05 RX ADMIN — FUROSEMIDE 40 MG: 10 INJECTION, SOLUTION INTRAMUSCULAR; INTRAVENOUS at 10:12

## 2025-02-05 RX ADMIN — SODIUM CHLORIDE, PRESERVATIVE FREE 10 ML: 5 INJECTION INTRAVENOUS at 10:13

## 2025-02-05 RX ADMIN — SACUBITRIL AND VALSARTAN 1 TABLET: 49; 51 TABLET, FILM COATED ORAL at 20:53

## 2025-02-05 RX ADMIN — WATER 1000 MG: 1 INJECTION INTRAMUSCULAR; INTRAVENOUS; SUBCUTANEOUS at 01:10

## 2025-02-05 RX ADMIN — SPIRONOLACTONE 12.5 MG: 25 TABLET ORAL at 13:54

## 2025-02-05 RX ADMIN — AMIODARONE HYDROCHLORIDE 400 MG: 200 TABLET ORAL at 10:12

## 2025-02-05 RX ADMIN — AZITHROMYCIN MONOHYDRATE 500 MG: 500 INJECTION, POWDER, LYOPHILIZED, FOR SOLUTION INTRAVENOUS at 01:19

## 2025-02-05 RX ADMIN — LORAZEPAM 1 MG: 1 TABLET ORAL at 12:12

## 2025-02-05 RX ADMIN — SACUBITRIL AND VALSARTAN 1 TABLET: 49; 51 TABLET, FILM COATED ORAL at 10:12

## 2025-02-05 RX ADMIN — LORAZEPAM 1 MG: 1 TABLET ORAL at 20:46

## 2025-02-05 RX ADMIN — METOPROLOL SUCCINATE 50 MG: 50 TABLET, EXTENDED RELEASE ORAL at 10:12

## 2025-02-05 RX ADMIN — Medication 2 PUFF: at 20:49

## 2025-02-05 RX ADMIN — AMIODARONE HYDROCHLORIDE 400 MG: 200 TABLET ORAL at 20:47

## 2025-02-05 RX ADMIN — MICONAZOLE NITRATE: 2 POWDER TOPICAL at 10:19

## 2025-02-05 RX ADMIN — FUROSEMIDE 40 MG: 10 INJECTION, SOLUTION INTRAMUSCULAR; INTRAVENOUS at 17:40

## 2025-02-05 RX ADMIN — PAROXETINE HYDROCHLORIDE 20 MG: 20 TABLET, FILM COATED ORAL at 10:12

## 2025-02-05 RX ADMIN — ACETAMINOPHEN 650 MG: 325 TABLET ORAL at 20:46

## 2025-02-05 RX ADMIN — INSULIN LISPRO 2 UNITS: 100 INJECTION, SOLUTION INTRAVENOUS; SUBCUTANEOUS at 20:46

## 2025-02-05 RX ADMIN — INSULIN LISPRO 2 UNITS: 100 INJECTION, SOLUTION INTRAVENOUS; SUBCUTANEOUS at 16:14

## 2025-02-05 ASSESSMENT — PAIN SCALES - GENERAL
PAINLEVEL_OUTOF10: 4
PAINLEVEL_OUTOF10: 7

## 2025-02-05 ASSESSMENT — PAIN DESCRIPTION - DESCRIPTORS: DESCRIPTORS: ACHING

## 2025-02-05 ASSESSMENT — PAIN DESCRIPTION - LOCATION: LOCATION: HEAD

## 2025-02-05 NOTE — PLAN OF CARE
Problem: Discharge Planning  Goal: Discharge to home or other facility with appropriate resources  2/5/2025 1245 by Lee Ann Garcia RN  Outcome: Progressing  2/5/2025 0618 by Kimberli aBrry RN  Outcome: Progressing     Problem: Safety - Adult  Goal: Free from fall injury  2/5/2025 1245 by Lee Ann Garcia RN  Outcome: Progressing  2/5/2025 0618 by Kimberli Barry RN  Outcome: Progressing     Problem: ABCDS Injury Assessment  Goal: Absence of physical injury  2/5/2025 1245 by Lee Ann Garcia RN  Outcome: Progressing  2/5/2025 0618 by Kimberli Barry RN  Outcome: Progressing     Problem: Chronic Conditions and Co-morbidities  Goal: Patient's chronic conditions and co-morbidity symptoms are monitored and maintained or improved  2/5/2025 1245 by Lee Ann Garcia RN  Outcome: Progressing  2/5/2025 0618 by Kimberli Barry RN  Outcome: Not Progressing     Problem: Chronic Conditions and Co-morbidities  Goal: Patient's chronic conditions and co-morbidity symptoms are monitored and maintained or improved  2/5/2025 1245 by Lee Ann Garcia RN  Outcome: Progressing  2/5/2025 0618 by Kimberli Barry RN  Outcome: Not Progressing

## 2025-02-05 NOTE — PLAN OF CARE
CHF Care Plan      Patient's EF (Ejection Fraction) is less than 40%    Heart Failure Medications:  Diuretics:: Furosemide    (One of the following REQUIRED for EF </= 40%/SYSTOLIC FAILURE but MAY be used in EF% >40%/DIASTOLIC FAILURE)        ACE:: None        ARB:: None         ARNI:: Sacubitril/Valsartan-Entresto    (Beta Blockers)  NON- Evidenced Based Beta Blocker (for EF% >40%/DIASTOLIC FAILURE): None      Evidenced Based Beta Blocker::(REQUIRED for EF% <40%/SYSTOLIC FAILURE) Metoprolol SUCCinate- Toprol XL  ...................................................................................................................................................    Failed to redirect to the Timeline version of the [employer] SmartLink.      Patient's weights and intake/output reviewed    Daily Weight log at bedside, patient/family participation in use of log: \"yes    Patient's current weight today shows a difference of 3.2 lbs less than last documented weight.      Intake/Output Summary (Last 24 hours) at 2/5/2025 0619  Last data filed at 2/5/2025 0400  Gross per 24 hour   Intake 1890 ml   Output 4575 ml   Net -2685 ml       Education Booklet Provided: yes    Comorbidities Reviewed Yes    Patient has a past medical history of Asthma, CAD (coronary artery disease), CHF (congestive heart failure) (HCC), Hyperlipidemia, Hypertension, and SINDY (obstructive sleep apnea).     >>For CHF and Comorbidity documentation on Education Time and Topics, please see Education Tab      CHF Education    Learners:  Patient  Readineess:   Eager and Acceptance  Method:   Explanation and Demonstration  Response:    Verbalizes Understanding, Demonstrated Understanding, and Needs Reinforcement    Comments: patient has hx of noncompliance, reviewed with him the importance of taking medication as ordered and not skipping a dose.also reviewed the need to follow diet and control blood sugar. Watch fluid intake and record weight every day    Time Spent:

## 2025-02-05 NOTE — PLAN OF CARE
CHF Care Plan      Patient's EF (Ejection Fraction) is less than 40%    Heart Failure Medications:  Diuretics:: Furosemide    (One of the following REQUIRED for EF </= 40%/SYSTOLIC FAILURE but MAY be used in EF% >40%/DIASTOLIC FAILURE)        ACE:: None        ARB:: None         ARNI:: Sacubitril/Valsartan-Entresto    (Beta Blockers)  NON- Evidenced Based Beta Blocker (for EF% >40%/DIASTOLIC FAILURE): None      Evidenced Based Beta Blocker::(REQUIRED for EF% <40%/SYSTOLIC FAILURE) Metoprolol SUCCinate- Toprol XL  ...................................................................................................................................................    Failed to redirect to the Timeline version of the [employer] SmartLink.      Patient's weights and intake/output reviewed    Daily Weight log at bedside, patient/family participation in use of log: \"yes    Patient's current weight today shows a difference of 3.2 lbs less than last documented weight.      Intake/Output Summary (Last 24 hours) at 2/5/2025 1320  Last data filed at 2/5/2025 1246  Gross per 24 hour   Intake 1650 ml   Output 3700 ml   Net -2050 ml       Education Booklet Provided: yes    Comorbidities Reviewed Yes    Patient has a past medical history of Asthma, CAD (coronary artery disease), CHF (congestive heart failure) (HCC), Hyperlipidemia, Hypertension, and SINDY (obstructive sleep apnea).     >>For CHF and Comorbidity documentation on Education Time and Topics, please see Education Tab      CHF Education    Learners:  Patient  Readineess:   Eager and Acceptance  Method:   Explanation and Demonstration  Response:    Verbalizes Understanding, Demonstrated Understanding, and Needs Reinforcement    Comments: Reviewed having a healthy heart and diabetic diet. Along with medication compliance    Time Spent: 5minutes      Pt resting in bed at this time on 4/L NC. Pt denies shortness of breath. Pt without lower extremity edema.     Patient and/or Family's

## 2025-02-06 PROBLEM — N18.31 STAGE 3A CHRONIC KIDNEY DISEASE (HCC): Status: ACTIVE | Noted: 2025-02-06

## 2025-02-06 PROBLEM — I25.10 CORONARY ARTERY DISEASE INVOLVING NATIVE CORONARY ARTERY OF NATIVE HEART WITHOUT ANGINA PECTORIS: Status: ACTIVE | Noted: 2025-02-06

## 2025-02-06 PROBLEM — I25.5 ISCHEMIC CARDIOMYOPATHY: Status: ACTIVE | Noted: 2025-02-06

## 2025-02-06 PROBLEM — J18.9 PNEUMONIA OF BOTH LUNGS DUE TO INFECTIOUS ORGANISM: Status: ACTIVE | Noted: 2025-02-06

## 2025-02-06 PROBLEM — I50.23 ACUTE ON CHRONIC HEART FAILURE WITH REDUCED EJECTION FRACTION (HFREF, <= 40%) (HCC): Status: ACTIVE | Noted: 2025-02-06

## 2025-02-06 PROBLEM — I47.20 VT (VENTRICULAR TACHYCARDIA) (HCC): Status: ACTIVE | Noted: 2025-02-06

## 2025-02-06 LAB
ALBUMIN SERPL-MCNC: 3.8 G/DL (ref 3.4–5)
ALP SERPL-CCNC: 90 U/L (ref 40–129)
ALT SERPL-CCNC: 239 U/L (ref 10–40)
ANION GAP SERPL CALCULATED.3IONS-SCNC: 13 MMOL/L (ref 3–16)
AST SERPL-CCNC: 68 U/L (ref 15–37)
BASOPHILS # BLD: 0.1 K/UL (ref 0–0.2)
BASOPHILS NFR BLD: 1.1 %
BILIRUB DIRECT SERPL-MCNC: 0.2 MG/DL (ref 0–0.3)
BILIRUB INDIRECT SERPL-MCNC: 0.2 MG/DL (ref 0–1)
BILIRUB SERPL-MCNC: 0.4 MG/DL (ref 0–1)
BUN SERPL-MCNC: 48 MG/DL (ref 7–20)
CALCIUM SERPL-MCNC: 9.4 MG/DL (ref 8.3–10.6)
CHLORIDE SERPL-SCNC: 99 MMOL/L (ref 99–110)
CO2 SERPL-SCNC: 25 MMOL/L (ref 21–32)
CREAT SERPL-MCNC: 1.9 MG/DL (ref 0.8–1.3)
DEPRECATED RDW RBC AUTO: 17 % (ref 12.4–15.4)
EOSINOPHIL # BLD: 0.2 K/UL (ref 0–0.6)
EOSINOPHIL NFR BLD: 2.2 %
GFR SERPLBLD CREATININE-BSD FMLA CKD-EPI: 39 ML/MIN/{1.73_M2}
GLUCOSE BLD-MCNC: 158 MG/DL (ref 70–99)
GLUCOSE BLD-MCNC: 160 MG/DL (ref 70–99)
GLUCOSE BLD-MCNC: 163 MG/DL (ref 70–99)
GLUCOSE BLD-MCNC: 196 MG/DL (ref 70–99)
GLUCOSE SERPL-MCNC: 197 MG/DL (ref 70–99)
HAV IGM SERPL QL IA: NORMAL
HBV CORE IGM SERPL QL IA: NORMAL
HBV SURFACE AG SERPL QL IA: NORMAL
HCT VFR BLD AUTO: 49.2 % (ref 40.5–52.5)
HCV AB SERPL QL IA: NORMAL
HGB BLD-MCNC: 16.3 G/DL (ref 13.5–17.5)
LYMPHOCYTES # BLD: 2 K/UL (ref 1–5.1)
LYMPHOCYTES NFR BLD: 28.2 %
MCH RBC QN AUTO: 28.6 PG (ref 26–34)
MCHC RBC AUTO-ENTMCNC: 33.2 G/DL (ref 31–36)
MCV RBC AUTO: 86.2 FL (ref 80–100)
MONOCYTES # BLD: 0.9 K/UL (ref 0–1.3)
MONOCYTES NFR BLD: 13.2 %
NEUTROPHILS # BLD: 4 K/UL (ref 1.7–7.7)
NEUTROPHILS NFR BLD: 55.3 %
NT-PROBNP SERPL-MCNC: 793 PG/ML (ref 0–124)
PERFORMED ON: ABNORMAL
PLATELET # BLD AUTO: 212 K/UL (ref 135–450)
PMV BLD AUTO: 8 FL (ref 5–10.5)
POTASSIUM SERPL-SCNC: 4.3 MMOL/L (ref 3.5–5.1)
PROT SERPL-MCNC: 7.1 G/DL (ref 6.4–8.2)
RBC # BLD AUTO: 5.7 M/UL (ref 4.2–5.9)
SODIUM SERPL-SCNC: 137 MMOL/L (ref 136–145)
WBC # BLD AUTO: 7.2 K/UL (ref 4–11)

## 2025-02-06 PROCEDURE — 2060000000 HC ICU INTERMEDIATE R&B

## 2025-02-06 PROCEDURE — 80076 HEPATIC FUNCTION PANEL: CPT

## 2025-02-06 PROCEDURE — 85025 COMPLETE CBC W/AUTO DIFF WBC: CPT

## 2025-02-06 PROCEDURE — 6370000000 HC RX 637 (ALT 250 FOR IP): Performed by: NURSE PRACTITIONER

## 2025-02-06 PROCEDURE — 83880 ASSAY OF NATRIURETIC PEPTIDE: CPT

## 2025-02-06 PROCEDURE — 94761 N-INVAS EAR/PLS OXIMETRY MLT: CPT

## 2025-02-06 PROCEDURE — 36415 COLL VENOUS BLD VENIPUNCTURE: CPT

## 2025-02-06 PROCEDURE — 2500000003 HC RX 250 WO HCPCS: Performed by: NURSE PRACTITIONER

## 2025-02-06 PROCEDURE — 80048 BASIC METABOLIC PNL TOTAL CA: CPT

## 2025-02-06 PROCEDURE — 94660 CPAP INITIATION&MGMT: CPT

## 2025-02-06 PROCEDURE — 2700000000 HC OXYGEN THERAPY PER DAY

## 2025-02-06 PROCEDURE — 6360000002 HC RX W HCPCS: Performed by: NURSE PRACTITIONER

## 2025-02-06 RX ORDER — FUROSEMIDE 20 MG/1
20 TABLET ORAL DAILY
Status: DISCONTINUED | OUTPATIENT
Start: 2025-02-07 | End: 2025-02-07 | Stop reason: HOSPADM

## 2025-02-06 RX ADMIN — LORAZEPAM 1 MG: 1 TABLET ORAL at 13:36

## 2025-02-06 RX ADMIN — PAROXETINE HYDROCHLORIDE 20 MG: 20 TABLET, FILM COATED ORAL at 08:19

## 2025-02-06 RX ADMIN — METOPROLOL SUCCINATE 50 MG: 50 TABLET, EXTENDED RELEASE ORAL at 08:19

## 2025-02-06 RX ADMIN — LORAZEPAM 1 MG: 1 TABLET ORAL at 20:56

## 2025-02-06 RX ADMIN — MICONAZOLE NITRATE: 2 POWDER TOPICAL at 08:28

## 2025-02-06 RX ADMIN — SACUBITRIL AND VALSARTAN 1 TABLET: 49; 51 TABLET, FILM COATED ORAL at 20:45

## 2025-02-06 RX ADMIN — SPIRONOLACTONE 12.5 MG: 25 TABLET ORAL at 08:19

## 2025-02-06 RX ADMIN — WATER 1000 MG: 1 INJECTION INTRAMUSCULAR; INTRAVENOUS; SUBCUTANEOUS at 00:59

## 2025-02-06 RX ADMIN — AMIODARONE HYDROCHLORIDE 400 MG: 200 TABLET ORAL at 08:19

## 2025-02-06 RX ADMIN — FUROSEMIDE 40 MG: 10 INJECTION, SOLUTION INTRAMUSCULAR; INTRAVENOUS at 08:19

## 2025-02-06 RX ADMIN — SODIUM CHLORIDE, PRESERVATIVE FREE 10 ML: 5 INJECTION INTRAVENOUS at 08:20

## 2025-02-06 RX ADMIN — SACUBITRIL AND VALSARTAN 1 TABLET: 49; 51 TABLET, FILM COATED ORAL at 08:19

## 2025-02-06 RX ADMIN — AMIODARONE HYDROCHLORIDE 400 MG: 200 TABLET ORAL at 20:46

## 2025-02-06 RX ADMIN — INSULIN LISPRO 2 UNITS: 100 INJECTION, SOLUTION INTRAVENOUS; SUBCUTANEOUS at 17:05

## 2025-02-06 RX ADMIN — SODIUM CHLORIDE, PRESERVATIVE FREE 5 ML: 5 INJECTION INTRAVENOUS at 20:47

## 2025-02-06 NOTE — PLAN OF CARE
CHF Care Plan      Patient's EF (Ejection Fraction) is less than 40%    Heart Failure Medications:  Diuretics:: Furosemide and Spironolactone    (One of the following REQUIRED for EF </= 40%/SYSTOLIC FAILURE but MAY be used in EF% >40%/DIASTOLIC FAILURE)        ACE:: None        ARB:: None         ARNI:: Sacubitril/Valsartan-Entresto    (Beta Blockers)  NON- Evidenced Based Beta Blocker (for EF% >40%/DIASTOLIC FAILURE): None    Evidenced Based Beta Blocker::(REQUIRED for EF% <40%/SYSTOLIC FAILURE) Metoprolol SUCCinate- Toprol XL  ...................................................................................................................................................    Failed to redirect to the Timeline version of the [employer] SmartLink.      Patient's weights and intake/output reviewed    Daily Weight log at bedside, patient/family participation in use of log: \"yes    Patient's current weight today shows a difference of 3 lbs less than last documented weight.      Intake/Output Summary (Last 24 hours) at 2/6/2025 0543  Last data filed at 2/6/2025 0400  Gross per 24 hour   Intake 680 ml   Output 3350 ml   Net -2670 ml       Education Booklet Provided: yes    Comorbidities Reviewed Yes    Patient has a past medical history of Asthma, CAD (coronary artery disease), CHF (congestive heart failure) (HCC), Hyperlipidemia, Hypertension, and SINDY (obstructive sleep apnea).     >>For CHF and Comorbidity documentation on Education Time and Topics, please see Education Tab      CHF Education    Learners:  Patient  Readineess:   Eager and Acceptance  Method:   Explanation and Demonstration  Response:    Verbalizes Understanding, Demonstrated Understanding, and Needs Reinforcement    Comments: reviewed with patient to take his medication as ordered. He states that he was taking it only not the right dose. Again reviewed that he needed to take medications as ordered.    Time Spent: 10 minutes      Pt resting in bed at this

## 2025-02-06 NOTE — PLAN OF CARE
CHF Care Plan      Patient's EF (Ejection Fraction) is less than 40%    Heart Failure Medications:  Diuretics:: Furosemide and Spironolactone    (One of the following REQUIRED for EF </= 40%/SYSTOLIC FAILURE but MAY be used in EF% >40%/DIASTOLIC FAILURE)        ACE:: None        ARB:: None         ARNI:: Sacubitril/Valsartan-Entresto    (Beta Blockers)  NON- Evidenced Based Beta Blocker (for EF% >40%/DIASTOLIC FAILURE): None    Evidenced Based Beta Blocker::(REQUIRED for EF% <40%/SYSTOLIC FAILURE) Metoprolol SUCCinate- Toprol XL  ...................................................................................................................................................    Failed to redirect to the Timeline version of the [employer] SmartLink.      Patient's weights and intake/output reviewed    Daily Weight log at bedside, patient/family participation in use of log: \"yes    Patient's current weight today shows a difference of 4 lbs less than last documented weight.      Intake/Output Summary (Last 24 hours) at 2/6/2025 0942  Last data filed at 2/6/2025 0816  Gross per 24 hour   Intake 680 ml   Output 3850 ml   Net -3170 ml       Education Booklet Provided: yes    Comorbidities Reviewed Yes    Patient has a past medical history of Asthma, CAD (coronary artery disease), CHF (congestive heart failure) (HCC), Hyperlipidemia, Hypertension, and SINDY (obstructive sleep apnea).     >>For CHF and Comorbidity documentation on Education Time and Topics, please see Education Tab      CHF Education    Learners:  Patient  Readineess:   Eager  Method:   Explanation  Response:   Verbalizes Understanding  Comments: Educated patient on CHF medications    Time Spent: 15 mins      Pt resting in bed at this time on  2 L O2. Pt denies shortness of breath. Pt with pitting lower extremity edema.     Patient and/or Family's stated Goal of Care this Admission: reduce shortness of breath and increase activity tolerance prior to

## 2025-02-06 NOTE — PLAN OF CARE
Patient doing better. Now getting up in chair.. respritoory  status not in any distress. Continue to diuress, remains AV Paced

## 2025-02-06 NOTE — PLAN OF CARE
Problem: Discharge Planning  Goal: Discharge to home or other facility with appropriate resources  2/6/2025 0902 by Isabel Trejo RN  Outcome: Progressing  Flowsheets (Taken 2/6/2025 0820)  Discharge to home or other facility with appropriate resources:   Identify barriers to discharge with patient and caregiver   Arrange for needed discharge resources and transportation as appropriate  2/6/2025 0536 by Kimberli Barry RN  Outcome: Progressing     Problem: Safety - Adult  Goal: Free from fall injury  2/6/2025 0902 by Isabel Trejo RN  Outcome: Progressing  2/6/2025 0536 by Kimberli Barry RN  Outcome: Progressing     Problem: ABCDS Injury Assessment  Goal: Absence of physical injury  2/6/2025 0902 by Isabel Trejo RN  Outcome: Progressing  2/6/2025 0536 by Kimberli Barry RN  Outcome: Progressing     Problem: Chronic Conditions and Co-morbidities  Goal: Patient's chronic conditions and co-morbidity symptoms are monitored and maintained or improved  2/6/2025 0902 by Isabel Trejo RN  Outcome: Progressing  Flowsheets (Taken 2/6/2025 0820)  Care Plan - Patient's Chronic Conditions and Co-Morbidity Symptoms are Monitored and Maintained or Improved: Monitor and assess patient's chronic conditions and comorbid symptoms for stability, deterioration, or improvement  2/6/2025 0536 by Kimberli Barry RN  Outcome: Progressing

## 2025-02-07 VITALS
RESPIRATION RATE: 16 BRPM | HEIGHT: 70 IN | BODY MASS INDEX: 42.1 KG/M2 | DIASTOLIC BLOOD PRESSURE: 77 MMHG | OXYGEN SATURATION: 92 % | HEART RATE: 71 BPM | WEIGHT: 294.1 LBS | TEMPERATURE: 97.9 F | SYSTOLIC BLOOD PRESSURE: 116 MMHG

## 2025-02-07 LAB
ALBUMIN SERPL-MCNC: 3.7 G/DL (ref 3.4–5)
ALP SERPL-CCNC: 95 U/L (ref 40–129)
ALT SERPL-CCNC: 204 U/L (ref 10–40)
ANION GAP SERPL CALCULATED.3IONS-SCNC: 13 MMOL/L (ref 3–16)
AST SERPL-CCNC: 62 U/L (ref 15–37)
BACTERIA BLD CULT ORG #2: NORMAL
BACTERIA BLD CULT: NORMAL
BILIRUB DIRECT SERPL-MCNC: 0.2 MG/DL (ref 0–0.3)
BILIRUB INDIRECT SERPL-MCNC: 0.1 MG/DL (ref 0–1)
BILIRUB SERPL-MCNC: 0.3 MG/DL (ref 0–1)
BUN SERPL-MCNC: 48 MG/DL (ref 7–20)
CALCIUM SERPL-MCNC: 9.4 MG/DL (ref 8.3–10.6)
CHLORIDE SERPL-SCNC: 100 MMOL/L (ref 99–110)
CO2 SERPL-SCNC: 25 MMOL/L (ref 21–32)
CREAT SERPL-MCNC: 1.8 MG/DL (ref 0.8–1.3)
GFR SERPLBLD CREATININE-BSD FMLA CKD-EPI: 42 ML/MIN/{1.73_M2}
GLUCOSE BLD-MCNC: 143 MG/DL (ref 70–99)
GLUCOSE BLD-MCNC: 171 MG/DL (ref 70–99)
GLUCOSE BLD-MCNC: 230 MG/DL (ref 70–99)
GLUCOSE SERPL-MCNC: 177 MG/DL (ref 70–99)
NT-PROBNP SERPL-MCNC: 755 PG/ML (ref 0–124)
PERFORMED ON: ABNORMAL
POTASSIUM SERPL-SCNC: 4.1 MMOL/L (ref 3.5–5.1)
PROT SERPL-MCNC: 6.9 G/DL (ref 6.4–8.2)
SODIUM SERPL-SCNC: 138 MMOL/L (ref 136–145)

## 2025-02-07 PROCEDURE — 80048 BASIC METABOLIC PNL TOTAL CA: CPT

## 2025-02-07 PROCEDURE — 97530 THERAPEUTIC ACTIVITIES: CPT

## 2025-02-07 PROCEDURE — 6360000002 HC RX W HCPCS: Performed by: NURSE PRACTITIONER

## 2025-02-07 PROCEDURE — 94660 CPAP INITIATION&MGMT: CPT

## 2025-02-07 PROCEDURE — 2700000000 HC OXYGEN THERAPY PER DAY

## 2025-02-07 PROCEDURE — 6370000000 HC RX 637 (ALT 250 FOR IP): Performed by: NURSE PRACTITIONER

## 2025-02-07 PROCEDURE — 94761 N-INVAS EAR/PLS OXIMETRY MLT: CPT

## 2025-02-07 PROCEDURE — 2500000003 HC RX 250 WO HCPCS: Performed by: NURSE PRACTITIONER

## 2025-02-07 PROCEDURE — 36415 COLL VENOUS BLD VENIPUNCTURE: CPT

## 2025-02-07 PROCEDURE — 97110 THERAPEUTIC EXERCISES: CPT

## 2025-02-07 PROCEDURE — 6370000000 HC RX 637 (ALT 250 FOR IP): Performed by: INTERNAL MEDICINE

## 2025-02-07 PROCEDURE — 80076 HEPATIC FUNCTION PANEL: CPT

## 2025-02-07 PROCEDURE — 83880 ASSAY OF NATRIURETIC PEPTIDE: CPT

## 2025-02-07 PROCEDURE — 97116 GAIT TRAINING THERAPY: CPT

## 2025-02-07 RX ORDER — ALBUTEROL SULFATE 90 UG/1
2 INHALANT RESPIRATORY (INHALATION) EVERY 4 HOURS PRN
Qty: 18 G | Refills: 0 | Status: SHIPPED | OUTPATIENT
Start: 2025-02-07

## 2025-02-07 RX ORDER — CEFUROXIME AXETIL 250 MG/1
250 TABLET ORAL 2 TIMES DAILY
Qty: 6 TABLET | Refills: 0 | Status: SHIPPED | OUTPATIENT
Start: 2025-02-07 | End: 2025-02-10

## 2025-02-07 RX ORDER — SPIRONOLACTONE 25 MG/1
12.5 TABLET ORAL DAILY
Qty: 30 TABLET | Refills: 0 | Status: SHIPPED | OUTPATIENT
Start: 2025-02-08

## 2025-02-07 RX ADMIN — SACUBITRIL AND VALSARTAN 1 TABLET: 49; 51 TABLET, FILM COATED ORAL at 08:50

## 2025-02-07 RX ADMIN — MICONAZOLE NITRATE: 2 POWDER TOPICAL at 08:51

## 2025-02-07 RX ADMIN — METOPROLOL SUCCINATE 50 MG: 50 TABLET, EXTENDED RELEASE ORAL at 08:50

## 2025-02-07 RX ADMIN — WATER 1000 MG: 1 INJECTION INTRAMUSCULAR; INTRAVENOUS; SUBCUTANEOUS at 01:00

## 2025-02-07 RX ADMIN — SPIRONOLACTONE 12.5 MG: 25 TABLET ORAL at 08:52

## 2025-02-07 RX ADMIN — SODIUM CHLORIDE, PRESERVATIVE FREE 10 ML: 5 INJECTION INTRAVENOUS at 08:50

## 2025-02-07 RX ADMIN — INSULIN LISPRO 2 UNITS: 100 INJECTION, SOLUTION INTRAVENOUS; SUBCUTANEOUS at 13:43

## 2025-02-07 RX ADMIN — AMIODARONE HYDROCHLORIDE 400 MG: 200 TABLET ORAL at 08:50

## 2025-02-07 RX ADMIN — PAROXETINE HYDROCHLORIDE 20 MG: 20 TABLET, FILM COATED ORAL at 08:50

## 2025-02-07 RX ADMIN — APIXABAN 5 MG: 5 TABLET, FILM COATED ORAL at 08:50

## 2025-02-07 RX ADMIN — FUROSEMIDE 20 MG: 20 TABLET ORAL at 08:50

## 2025-02-07 NOTE — DISCHARGE INSTR - COC
Continuity of Care Form    Patient Name: Dinesh Partida   :  1965  MRN:  [id number]    Admit date:  2025  Discharge date:  ***    Code Status Order: Full Code   Advance Directives:   Advance Care Flowsheet Documentation             Admitting Physician:  No admitting provider for patient encounter.  PCP: Zee Patel MD    Discharging Nurse: ***  Discharging Hospital Unit/Room#: 0218/0218-01  Discharging Unit Phone Number: ***    Emergency Contact:   Extended Emergency Contact Information  Primary Emergency Contact: Julián Partida  Home Phone: [phone]  Mobile Phone: [phone]  Relation: Aunt/Uncle  Secondary Emergency Contact: ELENITA SUAREZ  Home Phone: [phone]  Mobile Phone: [phone]  Relation: Other    Past Surgical History:  Past Surgical History:   Procedure Laterality Date    CARDIAC ELECTROPHYSIOLOGY STUDY AND ABLATION      had for afib    PACEMAKER INSERTION Left        Immunization History:   Immunization History   Administered Date(s) Administered    COVID-19, PFIZER PURPLE top, DILUTE for use, (age 12 y+), 30mcg/0.3mL 2021, 2021       Active Problems:  Patient Active Problem List   Diagnosis Code    Acute hypoxic respiratory failure J96.01    Acute on chronic heart failure with reduced ejection fraction (HFrEF, <= 40%) ([facility]) I50.23    Ischemic cardiomyopathy I25.5    Coronary artery disease involving native coronary artery of native heart without angina pectoris I25.10    VT (ventricular tachycardia) ([facility]) I47.20    Pneumonia of both lungs due to infectious organism J18.9    Stage 3a chronic kidney disease ([facility]) N18.31       Isolation/Infection:   Isolation            No Isolation          Patient Infection Status       None to display                     Nurse Assessment:  Last Vital Signs: BP (!) 152/90   Pulse 70   Temp 97.7 °F (36.5 °C) (Oral)   Resp 18   Ht 1.778 m (5' 10\")   Wt 133.4 kg (294 lb 1.6 oz)   SpO2 97%   BMI 42.20 kg/m²     Last

## 2025-02-07 NOTE — PLAN OF CARE
Problem: Discharge Planning  Goal: Discharge to home or other facility with appropriate resources  Outcome: Progressing     Problem: Safety - Adult  Goal: Free from fall injury  Outcome: Progressing     Problem: ABCDS Injury Assessment  Goal: Absence of physical injury  Outcome: Progressing     Problem: Chronic Conditions and Co-morbidities  Goal: Patient's chronic conditions and co-morbidity symptoms are monitored and maintained or improved  Outcome: Progressing     Problem: Skin/Tissue Integrity  Goal: Skin integrity remains intact  Description: 1.  Monitor for areas of redness and/or skin breakdown  2.  Assess vascular access sites hourly  3.  Every 4-6 hours minimum:  Change oxygen saturation probe site  4.  Every 4-6 hours:  If on nasal continuous positive airway pressure, respiratory therapy assess nares and determine need for appliance change or resting period  Outcome: Progressing

## 2025-02-07 NOTE — DISCHARGE SUMMARY
Hospital Medicine Discharge Summary    Patient: Dinesh Partida   : 1965     Hospital:  [facility]  Admit Date: 2025   Discharge Date:   ***  Disposition:  []Home   []HHC  []SNF  []Acute Rehab  []LTAC  []Hospice  Code status:  []Full  []DNR/CCA  []Limited (DNR/CCA with Do Not Intubate)  []DNRCC  Condition at Discharge: Stable  Primary Care Provider: Zee Patel MD    Admitting Provider: No admitting provider for patient encounter.  Discharge Provider: Ryan Sagastume MD     Discharge Diagnoses:      Active Hospital Problems    Diagnosis     Acute on chronic heart failure with reduced ejection fraction (HFrEF, <= 40%) ([facility]) [I50.23]     Ischemic cardiomyopathy [I25.5]     Coronary artery disease involving native coronary artery of native heart without angina pectoris [I25.10]     VT (ventricular tachycardia) ([facility]) [I47.20]     Pneumonia of both lungs due to infectious organism [J18.9]     Stage 3a chronic kidney disease ([facility]) [N18.31]     Acute hypoxic respiratory failure [J96.01]        Presenting Admission History:      ***     Assessment/Plan:      ***    Physical Exam Performed:      BP (!) 152/90   Pulse 70   Temp 97.7 °F (36.5 °C) (Oral)   Resp 18   Ht 1.778 m (5' 10\")   Wt 133.4 kg (294 lb 1.6 oz)   SpO2 97%   BMI 42.20 kg/m²       General appearance:  No apparent distress, appears stated age and cooperative.  Respiratory:  Normal respiratory effort.   Cardiovascular:  Regular rate and rhythm.  Abdomen:  Soft, non-tender, non-distended.  Musculoskeletal:  No edema  Neurologic:  Non-focal  Psychiatric:  Alert and oriented    Patient Discharge Instructions:      Follow up:    1.  Primary Care Provider Zee Patel MD in the next 1-2 weeks.      The patient was seen and examined on day of discharge and this discharge summary is in conjunction with any daily progress note from day of discharge. Time spent on discharge: 3*** minutes in the examination,

## 2025-02-07 NOTE — PROGRESS NOTES
CGMs are commonly used devices for people with diabetes. CGMs can be damaged by all forms of radiation, and this may lead to inaccurate glucose readings. Glucose Monitor- can be damaged by all forms of radiation, and this may lead to inaccurate glucose readings.  The patient has been provided education by the Radiologic Technologist and the monitor was either removed or shielded during exam.  
     [facility]   Heart Failure Daily Progress Note    Admit Date:  2/2/2025  HPI:    Chief Complaint   Patient presents with    Respiratory Distress     Patient arrives to ED with EMS for sudden onset shortness of breath. Spo2 on room air 72%. Patient given Duoneb and albuterol treatment by EMS. 84% on CPAP with EMS. Patient has hx of COPD and CHF.        Dinesh Partida is being followed for shortness of breath.     PMH: obesity, diabetes mellitus, hypertension, hyperlipidemia, CKD, LBBB, VT, atrial fibrillation, ischemic cardiomyopathy with severe LV dysfunction, status post CRT-D with LV lead upgrade 10/2021 ([employer]) and congestive heart failure with reduced EF; FOllows with [facility] cardiology, SINDY on CPAP     Interval history: CT chest showed no pulmonary emboli, multifocal pneumonia and cardiomegaly noted   Oxygen requirements down to 4l NC    Subjective:  Mr. Partida having hemoptysis this am.   Breathing is improving.     Objective:   /80   Pulse 70   Temp 97.7 °F (36.5 °C) (Axillary)   Resp 20   Ht 1.778 m (5' 10\")   Wt 134.1 kg (295 lb 9.6 oz)   SpO2 96%   BMI 42.41 kg/m²     Intake/Output Summary (Last 24 hours) at 2/4/2025 0906  Last data filed at 2/4/2025 0818  Gross per 24 hour   Intake 845 ml   Output 3800 ml   Net -2955 ml       NYHA: IV    Physical Exam:  General:  Awake, alert, NAD, NC in place   Skin:  Warm and dry  Neck:  JVD unable to assess due to body habitus   Chest:  Clear to auscultation, no wheezes/rhonchi/rales  Cardiovascular:  RRR S1S2, no m/r/g, distant heart sounds   Abdomen:  Soft, nontender, +bowel sounds  Extremities:  no  bilateral lower extremity edema    Medications:    [Held by provider] apixaban  5 mg Oral BID    [Held by provider] atorvastatin  40 mg Oral Nightly    sacubitril-valsartan  1 tablet Oral BID    montelukast  10 mg Oral Nightly    furosemide  40 mg IntraVENous BID    cefTRIAXone (ROCEPHIN) IV  1,000 mg IntraVENous Q24H    And    
   02/02/25 2125   NIV Type   NIV Started/Stopped On   Equipment Type v60   Mode Bilevel   Mask Type Full face mask   Mask Size Large   Assessment   Pulse 85   Respirations (!) 36   BP (!) 174/108   SpO2 (!) 84 %   Comfort Level Good   Using Accessory Muscles No   Mask Compliance Good   Skin Assessment Clean, dry, & intact   Skin Protection for O2 Device N/A   Breath Sounds   Right Upper Lobe Diminished   Right Middle Lobe Diminished   Right Lower Lobe Diminished   Left Upper Lobe Diminished   Left Lower Lobe Diminished   Settings/Measurements   PIP Observed 24 cm H20   IPAP 20 cmH20   CPAP/EPAP 8 cmH2O   Vt (Measured) 1105 mL   Rate Ordered 12   Insp Rise Time (%) 1 %   FiO2  100 %   I Time/ I Time % 0.85 s   Minute Volume (L/min) 39.3 Liters   Mask Leak (lpm) 61 lpm  (pt has beard, WOB at this time)   Patient's Home Machine No   Alarm Settings   Alarms On Y   Low Pressure (cmH2O) 6 cmH2O   High Pressure (cmH2O) 35 cmH2O   Delay Alarm 20 sec(s)   RR Low (bpm) 6   RR High (bpm) 50 br/min       
   02/02/25 2236   NIV Type   NIV Started/Stopped On   Equipment Type v60   Mode Bilevel   Mask Type Full face mask   Assessment   Pulse 74   Respirations 24   BP 95/63   SpO2 93 %   Comfort Level Good   Using Accessory Muscles No   Mask Compliance Good   Skin Assessment Clean, dry, & intact   Settings/Measurements   PIP Observed 13 cm H20   IPAP 16 cmH20   CPAP/EPAP 8 cmH2O   Vt (Measured) 833 mL   Rate Ordered 12   Insp Rise Time (%) 1 %   FiO2  50 %   I Time/ I Time % 0.85 s   Minute Volume (L/min) 18.5 Liters   Mask Leak (lpm) 92 lpm   Patient's Home Machine No   Alarm Settings   Alarms On Y   Low Pressure (cmH2O) 6 cmH2O   High Pressure (cmH2O) 35 cmH2O   Delay Alarm 20 sec(s)   RR Low (bpm) 6   RR High (bpm) 50 br/min       
   02/03/25 0027   NIV Type   $NIV $Daily Charge   Equipment Type v60   Mode Bilevel   Mask Type Full face mask   Mask Size Large   Assessment   Respirations 20   Comfort Level Good   Using Accessory Muscles No   Mask Compliance Good   Skin Assessment Clean, dry, & intact   Skin Protection for O2 Device N/A   Settings/Measurements   PIP Observed 13 cm H20   IPAP 16 cmH20   CPAP/EPAP 8 cmH2O   Vt (Measured) 705 mL   Rate Ordered 12   Insp Rise Time (%) 1 %   FiO2  55 %   I Time/ I Time % 0.85 s   Minute Volume (L/min) 18.8 Liters   Mask Leak (lpm) 50 lpm  (pt has beard)   Patient's Home Machine No   Alarm Settings   Alarms On Y   Low Pressure (cmH2O) 6 cmH2O   High Pressure (cmH2O) 35 cmH2O   Delay Alarm 20 sec(s)   RR Low (bpm) 6   RR High (bpm) 50 br/min       
   02/03/25 0305   NIV Type   Equipment Type v60   Mode Bilevel   Mask Type Full face mask   Mask Size Large   Assessment   Respirations 21   SpO2 94 %   Comfort Level Good   Using Accessory Muscles No   Mask Compliance Good   Skin Assessment Clean, dry, & intact   Skin Protection for O2 Device No  (pt declined)   Settings/Measurements   PIP Observed 16 cm H20   IPAP 16 cmH20   CPAP/EPAP 8 cmH2O   Vt (Measured) 691 mL   Rate Ordered 12   Insp Rise Time (%) 1 %   FiO2  50 %   I Time/ I Time % 0.85 s   Minute Volume (L/min) 17.8 Liters   Mask Leak (lpm) 68 lpm  (facial hair)   Patient's Home Machine No   Alarm Settings   Alarms On Y   Low Pressure (cmH2O) 6 cmH2O   High Pressure (cmH2O) 30 cmH2O   Apnea (secs) 20 secs   RR Low (bpm) 6   RR High (bpm) 45 br/min       
   02/03/25 1937   NIV Type   NIV Started/Stopped On   Equipment Type v60   Mode Bilevel   Mask Type Full face mask   Mask Size Large   Settings/Measurements   PIP Observed 16 cm H20   IPAP 16 cmH20   CPAP/EPAP 8 cmH2O   Vt (Measured) 922 mL   Rate Ordered 12   Insp Rise Time (%) 1 %   FiO2  50 %   I Time/ I Time % 0.85 s   Minute Volume (L/min) 18.6 Liters   Mask Leak (lpm) 88 lpm   Patient's Home Machine No   Alarm Settings   Alarms On Y   Low Pressure (cmH2O) 6 cmH2O   High Pressure (cmH2O) 30 cmH2O   Delay Alarm 20 sec(s)   RR Low (bpm) 6   RR High (bpm) 40 br/min       
   02/03/25 2341   NIV Type   NIV Started/Stopped On   Equipment Type v60   Mode Bilevel   Mask Type Full face mask   Mask Size Large   Settings/Measurements   PIP Observed 16 cm H20   IPAP 16 cmH20   CPAP/EPAP 8 cmH2O   Vt (Measured) 452 mL   Rate Ordered 12   Insp Rise Time (%) 1 %   FiO2  50 %   I Time/ I Time % 0.85 s   Minute Volume (L/min) 10.3 Liters   Mask Leak (lpm) 82 lpm   Patient's Home Machine No   Alarm Settings   Alarms On Y   Low Pressure (cmH2O) 6 cmH2O   High Pressure (cmH2O) 30 cmH2O   Delay Alarm 20 sec(s)   RR Low (bpm) 6   RR High (bpm) 40 br/min       
   02/04/25 0405   NIV Type   $NIV $Daily Charge   NIV Started/Stopped On   Equipment Type v60   Mode Bilevel   Mask Type Full face mask   Mask Size Large   Settings/Measurements   PIP Observed 17 cm H20   IPAP 16 cmH20   CPAP/EPAP 8 cmH2O   Vt (Measured) 689 mL   Rate Ordered 12   Insp Rise Time (%) 1 %   FiO2  50 %   I Time/ I Time % 0.85 s   Minute Volume (L/min) 12.8 Liters   Mask Leak (lpm) 86 lpm   Patient's Home Machine No   Alarm Settings   Alarms On Y   Low Pressure (cmH2O) 6 cmH2O   High Pressure (cmH2O) 30 cmH2O   Delay Alarm 20 sec(s)   RR Low (bpm) 6   RR High (bpm) 40 br/min       
   02/04/25 2017   NIV Type   NIV Started/Stopped On   Equipment Type v60   Mode Bilevel   Mask Type Full face mask   Mask Size Large   Assessment   Respirations 16   SpO2 96 %   Comfort Level Good   Using Accessory Muscles No   Mask Compliance Good   Skin Assessment Clean, dry, & intact   Skin Protection for O2 Device Yes   Orientation Middle   Location Nose   Intervention(s) Skin Barrier   Settings/Measurements   PIP Observed 17 cm H20   IPAP 16 cmH20   CPAP/EPAP 8 cmH2O   Vt (Measured) 892 mL   Rate Ordered 12   Insp Rise Time (%) 1 %   FiO2  50 %   I Time/ I Time % 0.85 s   Minute Volume (L/min) 13.9 Liters   Mask Leak (lpm) 50 lpm   Patient's Home Machine No   Alarm Settings   Alarms On Y   Low Pressure (cmH2O) 6 cmH2O   High Pressure (cmH2O) 30 cmH2O   Apnea (secs) 20 secs   RR Low (bpm) 6   RR High (bpm) 40 br/min       
   02/04/25 2329   NIV Type   NIV Started/Stopped On   Equipment Type v60   Mode Bilevel   Mask Type Full face mask   Mask Size Large   Settings/Measurements   PIP Observed 16 cm H20   IPAP 16 cmH20   CPAP/EPAP 8 cmH2O   Vt (Measured) 787 mL   Rate Ordered 12   Insp Rise Time (%) 1 %   FiO2  50 %   I Time/ I Time % 0.85 s   Minute Volume (L/min) 18.5 Liters   Mask Leak (lpm) 88 lpm   Patient's Home Machine No   Alarm Settings   Alarms On Y   Low Pressure (cmH2O) 6 cmH2O   High Pressure (cmH2O) 30 cmH2O   Delay Alarm 20 sec(s)   RR Low (bpm) 6   RR High (bpm) 40 br/min       
   02/05/25 0341   NIV Type   $NIV $Daily Charge   NIV Started/Stopped On   Equipment Type v60   Mode Bilevel   Mask Type Full face mask   Mask Size Large   Settings/Measurements   PIP Observed 15 cm H20   IPAP 16 cmH20   CPAP/EPAP 8 cmH2O   Vt (Measured) 863 mL   Rate Ordered 12   Insp Rise Time (%) 1 %   FiO2  50 %   I Time/ I Time % 0.85 s   Minute Volume (L/min) 12.2 Liters   Mask Leak (lpm) 86 lpm   Patient's Home Machine No   Alarm Settings   Alarms On Y   Low Pressure (cmH2O) 6 cmH2O   High Pressure (cmH2O) 30 cmH2O   Delay Alarm 20 sec(s)   RR Low (bpm) 6   RR High (bpm) 40 br/min       
   02/05/25 2104   NIV Type   NIV Started/Stopped On   Equipment Type v60   Mode Bilevel   Mask Type Full face mask   Mask Size Large   Assessment   Comfort Level Good   Using Accessory Muscles No   Mask Compliance Good   Skin Assessment Clean, dry, & intact   Skin Protection for O2 Device Yes   Orientation Middle   Location Nose   Intervention(s) Skin Barrier   Settings/Measurements   PIP Observed 12 cm H20   IPAP 16 cmH20   CPAP/EPAP 8 cmH2O   Vt (Measured) 1164 mL   Rate Ordered 12   Insp Rise Time (%) 1 %   FiO2  35 %   I Time/ I Time % 0.85 s   Minute Volume (L/min) 18.9 Liters   Mask Leak (lpm) 85 lpm   Patient's Home Machine No   Alarm Settings   Alarms On Y       
   02/05/25 2426   NIV Type   NIV Started/Stopped On   Equipment Type v60   Mode Bilevel   Mask Type Full face mask   Mask Size Large   Assessment   Comfort Level Good   Using Accessory Muscles No   Mask Compliance Good   Skin Assessment Clean, dry, & intact   Skin Protection for O2 Device Yes   Settings/Measurements   PIP Observed 12 cm H20   IPAP 16 cmH20   CPAP/EPAP 8 cmH2O   Vt (Measured) 708 mL   Rate Ordered 12   Insp Rise Time (%) 1 %   FiO2  35 %   I Time/ I Time % 0.85 s   Minute Volume (L/min) 11.2 Liters   Mask Leak (lpm) 78 lpm   Patient's Home Machine No   Alarm Settings   Alarms On Y       
   02/06/25 0353   NIV Type   $NIV $Daily Charge   NIV Started/Stopped On   Equipment Type 60   Mode Bilevel   Mask Type Full face mask   Mask Size Large   Assessment   Comfort Level Good   Using Accessory Muscles No   Mask Compliance Good   Skin Assessment Clean, dry, & intact   Skin Protection for O2 Device Yes   Settings/Measurements   PIP Observed 12 cm H20   IPAP 16 cmH20   CPAP/EPAP 8 cmH2O   Vt (Measured) 409 mL   Rate Ordered 12   Insp Rise Time (%) 1 %   FiO2  35 %   I Time/ I Time % 0.85 s   Minute Volume (L/min) 12.2 Liters   Mask Leak (lpm) 69 lpm   Patient's Home Machine No   Alarm Settings   Alarms On Y       
   02/06/25 1114   NIV Type   NIV Started/Stopped On   Equipment Type v60   Mode Bilevel   Mask Type Full face mask   Mask Size Large   Settings/Measurements   PIP Observed 15 cm H20   IPAP 16 cmH20   CPAP/EPAP 8 cmH2O   Vt (Measured) 1165 mL   Rate Ordered 12   Insp Rise Time (%) 1 %   FiO2  35 %   I Time/ I Time % 0.85 s   Minute Volume (L/min) 15.8 Liters   Patient's Home Machine No   Alarm Settings   Alarms On Y       
   02/06/25 2058   NIV Type   Equipment Type v60   Mode Bilevel   Mask Type Full face mask   Mask Size Large   Assessment   Respirations 15   SpO2 95 %   Comfort Level Good   Using Accessory Muscles No   Mask Compliance Good   Skin Assessment Clean, dry, & intact   Skin Protection for O2 Device Yes   Orientation Middle   Location Nose   Intervention(s) Skin Barrier   Settings/Measurements   PIP Observed 18 cm H20   IPAP 16 cmH20   CPAP/EPAP 8 cmH2O   Vt (Measured) 962 mL   Rate Ordered 12   FiO2  35 %   I Time/ I Time % 0.85 s   Minute Volume (L/min) 8 Liters   Patient's Home Machine No   Alarm Settings   Alarms On Y   Low Pressure (cmH2O) 6 cmH2O   High Pressure (cmH2O) 30 cmH2O   Apnea (secs) 20 secs   RR Low (bpm) 6   RR High (bpm) 40 br/min       
   02/07/25 0033   NIV Type   $NIV $Daily Charge   Equipment Type v60   Mode Bilevel   Mask Type Full face mask   Mask Size Large   Assessment   Respirations 16   SpO2 97 %   Comfort Level Good   Using Accessory Muscles No   Mask Compliance Good   Skin Protection for O2 Device Yes   Orientation Middle   Location Nose   Intervention(s) Skin Barrier   Settings/Measurements   PIP Observed 17 cm H20   IPAP 16 cmH20   CPAP/EPAP 8 cmH2O   Vt (Measured) 725 mL   Rate Ordered 12   FiO2  35 %   I Time/ I Time % 0.85 s   Minute Volume (L/min) 11 Liters   Mask Leak (lpm) 66 lpm  (facial hair)   Patient's Home Machine No   Alarm Settings   Alarms On Y   Low Pressure (cmH2O) 6 cmH2O   High Pressure (cmH2O) 30 cmH2O   Apnea (secs) 20 secs   RR Low (bpm) 6   RR High (bpm) 40 br/min       
   02/07/25 0345   NIV Type   Equipment Type v60   Mode Bilevel   Mask Type Full face mask   Mask Size Large   Assessment   Comfort Level Good   Using Accessory Muscles No   Mask Compliance Good   Skin Protection for O2 Device Yes   Orientation Middle   Location Nose   Intervention(s) Skin Barrier   Settings/Measurements   PIP Observed 15 cm H20   IPAP 16 cmH20   CPAP/EPAP 8 cmH2O   Vt (Measured) 504 mL   Rate Ordered 12   FiO2  35 %   I Time/ I Time % 0.85 s   Minute Volume (L/min) 8.6 Liters   Mask Leak (lpm) 33 lpm   Patient's Home Machine No   Alarm Settings   Alarms On Y   Low Pressure (cmH2O) 6 cmH2O   High Pressure (cmH2O) 30 cmH2O   Apnea (secs) 20 secs   RR Low (bpm) 6   RR High (bpm) 40 br/min       
  [facility] Medicine Progress Note  V 1.6      Date of Admission: 2/2/2025    Hospital Day: 5      Chief Admission Complaint:   Shortness of breath     Subjective:   sob better on 2.5  l o2 NC, no   hemoptysis    Presenting Admission History:     59 y.o. male, with PMH of obesity, DM, CHF, HTN, HLD atrial fibrillation, who presented to [facility] with shortness of breath. History obtained from the patient and review of EMR.  Patient stated around 8 PM he had a sudden onset of shortness of breath.  He stated at home his SpO2 was 72% on room air.  EMS was called and the patient was found to be 84% on room air and given a DuoNeb treatment as well as albuterol.  The patient was placed on CPAP at that time and brought to the emergency department for further evaluation. In the emergency department a chest x-ray was obtained that revealed mild cardiomegaly and pulmonary vascular congestion.  Right lower lobe airspace disease consistent with underlying atelectasis and/or pneumonia.  Patient's SpO2 was 86% on room air and he was placed on BiPAP.  He was given Solu-Medrol, 60 mg of IV Lasix as well as a DuoNeb treatment.  The patient's proBNP was found to be 2536.  Upon further evaluation, the patient did meet sepsis criteria with a WBC 18.6, lactic 4.9 and respiratory rate of 24.  The patient was not given IV fluids due to fluid overload.  Blood cultures were obtained and the patient was started on azithromycin and ceftriaxone.  The patient was also found to have an elevated troponin of 29 which is likely secondary to CKD.  Patient denies any chest pain at this time.  He was admitted for further evaluation and treatment. The patient denied any other associated symptoms as well as any aggravating and/or alleviating factors. At the time of this assessment, the patient was resting comfortably in bed. He currently denies any chest pain, back pain, abdominal pain, shortness of breath, numbness, tingling, N/V/C/D, fever and/or 
  [facility] Medicine Progress Note  V 1.6      Date of Admission: 2/2/2025    Hospital Day: 2      Chief Admission Complaint:   Shortness of breath     Subjective:   sob on BIPAP, takes CPAP over night at home   Currently on 50 % fio2     Presenting Admission History:     59 y.o. male, with PMH of obesity, DM, CHF, HTN, HLD atrial fibrillation, who presented to [facility] with shortness of breath. History obtained from the patient and review of EMR.  Patient stated around 8 PM he had a sudden onset of shortness of breath.  He stated at home his SpO2 was 72% on room air.  EMS was called and the patient was found to be 84% on room air and given a DuoNeb treatment as well as albuterol.  The patient was placed on CPAP at that time and brought to the emergency department for further evaluation. In the emergency department a chest x-ray was obtained that revealed mild cardiomegaly and pulmonary vascular congestion.  Right lower lobe airspace disease consistent with underlying atelectasis and/or pneumonia.  Patient's SpO2 was 86% on room air and he was placed on BiPAP.  He was given Solu-Medrol, 60 mg of IV Lasix as well as a DuoNeb treatment.  The patient's proBNP was found to be 2536.  Upon further evaluation, the patient did meet sepsis criteria with a WBC 18.6, lactic 4.9 and respiratory rate of 24.  The patient was not given IV fluids due to fluid overload.  Blood cultures were obtained and the patient was started on azithromycin and ceftriaxone.  The patient was also found to have an elevated troponin of 29 which is likely secondary to CKD.  Patient denies any chest pain at this time.  He was admitted for further evaluation and treatment. The patient denied any other associated symptoms as well as any aggravating and/or alleviating factors. At the time of this assessment, the patient was resting comfortably in bed. He currently denies any chest pain, back pain, abdominal pain, shortness of breath, numbness, 
  [facility] Medicine Progress Note  V 1.6      Date of Admission: 2/2/2025    Hospital Day: 3      Chief Admission Complaint:   Shortness of breath     Subjective:   sob better on 6 l o2 NC, hemoptysis    Presenting Admission History:     59 y.o. male, with PMH of obesity, DM, CHF, HTN, HLD atrial fibrillation, who presented to [facility] with shortness of breath. History obtained from the patient and review of EMR.  Patient stated around 8 PM he had a sudden onset of shortness of breath.  He stated at home his SpO2 was 72% on room air.  EMS was called and the patient was found to be 84% on room air and given a DuoNeb treatment as well as albuterol.  The patient was placed on CPAP at that time and brought to the emergency department for further evaluation. In the emergency department a chest x-ray was obtained that revealed mild cardiomegaly and pulmonary vascular congestion.  Right lower lobe airspace disease consistent with underlying atelectasis and/or pneumonia.  Patient's SpO2 was 86% on room air and he was placed on BiPAP.  He was given Solu-Medrol, 60 mg of IV Lasix as well as a DuoNeb treatment.  The patient's proBNP was found to be 2536.  Upon further evaluation, the patient did meet sepsis criteria with a WBC 18.6, lactic 4.9 and respiratory rate of 24.  The patient was not given IV fluids due to fluid overload.  Blood cultures were obtained and the patient was started on azithromycin and ceftriaxone.  The patient was also found to have an elevated troponin of 29 which is likely secondary to CKD.  Patient denies any chest pain at this time.  He was admitted for further evaluation and treatment. The patient denied any other associated symptoms as well as any aggravating and/or alleviating factors. At the time of this assessment, the patient was resting comfortably in bed. He currently denies any chest pain, back pain, abdominal pain, shortness of breath, numbness, tingling, N/V/C/D, fever and/or chills. 
4 Eyes Skin Assessment     NAME:  Dinesh Partida  YOB: 1965  MEDICAL RECORD NUMBER:  [id number]    The patient is being assessed for  Admission    I agree that at least one RN has performed a thorough Head to Toe Skin Assessment on the patient. ALL assessment sites listed below have been assessed.      Areas assessed by both nurses:    Head, Face, Ears, Shoulders, Back, Chest, Arms, Elbows, Hands, Sacrum. Buttock, Coccyx, Ischium, Legs. Feet and Heels, and Under Medical Devices         Does the Patient have a Wound? No noted wound(s)       Thee Prevention initiated by RN: Yes  Wound Care Orders initiated by RN: No    Pressure Injury (Stage 3,4, Unstageable, DTI, NWPT, and Complex wounds) if present, place Wound referral order by RN under : No    New Ostomies, if present place, Ostomy referral order under : No     Nurse 1 eSignature: Electronically signed by Kimberli Barry RN on 2/3/25 at 6:48 AM EST    **SHARE this note so that the co-signing nurse can place an eSignature**    Nurse 2 eSignature: {Esignature:[id number]}   
[facility]   Daily Progress Note    Admit Date:  2/2/2025  HPI:    Chief Complaint   Patient presents with    Respiratory Distress     Patient arrives to ED with EMS for sudden onset shortness of breath. Spo2 on room air 72%. Patient given Duoneb and albuterol treatment by EMS. 84% on CPAP with EMS. Patient has hx of COPD and CHF.      Dinesh Partida presented with shortness of breath    Cardiology consulted for CHF    PMH: obesity, diabetes mellitus, hypertension, hyperlipidemia, CKD, LBBB, VT s/p prior VT ablation 4/2024, atrial fibrillation, ischemic cardiomyopathy with severe LV dysfunction, status post CRT-D with LV lead upgrade 10/2021 ([employer]) and congestive heart failure with reduced EF; follows with [facility] cardiology, SINDY on CPAP    Subjective:  Mr. Partida seen lying flat in bed on Bipap, breathing is improved.  No further hemoptysis overnight or this am.   Still with productive cough of sputum    Objective:   Patient Vitals for the past 24 hrs:   BP Temp Temp src Pulse Resp SpO2 Weight   02/06/25 0816 128/88 97.3 °F (36.3 °C) Axillary 70 16 95 % --   02/06/25 0501 -- -- -- -- -- -- 130.7 kg (288 lb 4 oz)   02/06/25 0400 107/67 97.5 °F (36.4 °C) Axillary 70 18 95 % --   02/05/25 2330 126/75 98.3 °F (36.8 °C) Axillary 70 18 95 % --   02/05/25 2046 -- -- -- -- -- 95 % --   02/05/25 2000 109/77 98.1 °F (36.7 °C) Axillary 70 18 98 % --   02/05/25 1611 120/75 98.4 °F (36.9 °C) Axillary 70 -- 94 % --   02/05/25 1122 134/89 97.8 °F (36.6 °C) Axillary 71 -- 96 % --   02/05/25 0959 -- -- -- -- -- 96 % --       Intake/Output Summary (Last 24 hours) at 2/6/2025 0901  Last data filed at 2/6/2025 0816  Gross per 24 hour   Intake 680 ml   Output 3850 ml   Net -3170 ml     Wt Readings from Last 3 Encounters:   02/06/25 130.7 kg (288 lb 4 oz)   10/02/24 129.5 kg (285 lb 6.4 oz)   01/10/24 126.6 kg (279 lb)         ASSESSMENT:   HFrEF, acute on chronic: weight down 4 lbs for total of 15 lbs, net - 7.8L; 
[facility]   Daily Progress Note    Admit Date:  2/2/2025  HPI:    Chief Complaint   Patient presents with    Respiratory Distress     Patient arrives to ED with EMS for sudden onset shortness of breath. Spo2 on room air 72%. Patient given Duoneb and albuterol treatment by EMS. 84% on CPAP with EMS. Patient has hx of COPD and CHF.      Dinesh Partida presented with shortness of breath    Cardiology consulted for CHF    PMH: obesity, diabetes mellitus, hypertension, hyperlipidemia, CKD, LBBB, VT s/p prior VT ablation 4/2024, atrial fibrillation, ischemic cardiomyopathy with severe LV dysfunction, status post CRT-D with LV lead upgrade 10/2021 ([employer]) and congestive heart failure with reduced EF; follows with [facility] cardiology, SINDY on CPAP    Subjective:  Mr. Partida seen lying flat in bed, significant other at bedside.   Objective:   Patient Vitals for the past 24 hrs:   BP Temp Temp src Pulse Resp SpO2 Weight   02/07/25 0424 132/73 97.5 °F (36.4 °C) Axillary 70 16 96 % 133.4 kg (294 lb 1.6 oz)   02/07/25 0033 -- -- -- -- 16 97 % --   02/06/25 2355 139/62 99.7 °F (37.6 °C) Oral 71 17 95 % --   02/06/25 2058 -- -- -- -- 15 95 % --   02/06/25 2030 129/76 98.1 °F (36.7 °C) Axillary 70 18 94 % --   02/06/25 1532 117/71 98 °F (36.7 °C) Oral 71 18 92 % --   02/06/25 1103 115/77 97.7 °F (36.5 °C) Axillary 70 16 96 % --   02/06/25 0816 128/88 97.3 °F (36.3 °C) Axillary 70 16 95 % --       Intake/Output Summary (Last 24 hours) at 2/7/2025 0807  Last data filed at 2/7/2025 0446  Gross per 24 hour   Intake 1200 ml   Output 2825 ml   Net -1625 ml     Wt Readings from Last 3 Encounters:   02/07/25 133.4 kg (294 lb 1.6 oz)   10/02/24 129.5 kg (285 lb 6.4 oz)   01/10/24 126.6 kg (279 lb)         ASSESSMENT:   HFrEF, acute on chronic: weight down total of 14 lbs, net - 9.5L; euvolemia on exam  Ischemic cardiomyopathy: EF 20-25%, on Toprol, Entresto, spironolactone (new)  CAD with known -RCA, with collaterals: no 
Occupational Therapy  Facility/Department: [facility] A2 CARD TELEMETRY  Occupational Therapy Initial Assessment/Treatment Note    Name: Dinesh Partida  : 1965  MRN: [id number]  Date of Service: 2025    Discharge Recommendations:  Home with assist PRN, Home with Home health OT  OT Equipment Recommendations  Equipment Needed: No       Patient Diagnosis(es): The primary encounter diagnosis was Acute hypoxic respiratory failure. Diagnoses of Acute on chronic congestive heart failure, unspecified heart failure type (HCC) and Congestive heart failure, unspecified HF chronicity, unspecified heart failure type (HCC) were also pertinent to this visit.  Past Medical History:  has a past medical history of Asthma, CAD (coronary artery disease), CHF (congestive heart failure) (HCC), Hyperlipidemia, Hypertension, and SINDY (obstructive sleep apnea).  Past Surgical History:  has a past surgical history that includes Pacemaker insertion (Left) and Cardiac electrophysiology study and ablation.           Assessment  Performance deficits / Impairments: Decreased functional mobility ;Decreased endurance;Decreased ADL status;Decreased ROM;Decreased balance;Decreased strength  Assessment: 58 y/o female presenting to [facility] with acute hypoxic respiratory failure. Pt resides with significant other in one-story home with 1 [facility]. Prior to admission pt completing ADLs and functional mobility independently without AD. At baseline pt wears 2L O2 only at night. Pt on 4L O2 during session and maintained 94% or >O2 saturation throughout. This date pt required min A with UB dressing, max A with LB dressing, max A with bed mobility, and SBA with functional mobility/transfers without AD. Pt required inc assist from significant other for LB dressing and bed mobility. Pt provided with and educated on CHF EC techniques with report good understanding with cues to help choose appropriate zone. Pt would benefit from skilled OT to address strength and 
Patient discharged 2/7/2025, 5:10 PM per MD order. Patient's IV removed bleeding stopped, tele box removed place in dirty bin, VVS. Discharge instructions and medication education given all questions answered, no additional questions. Patient wheeled out by staff with personal belonging and family at side. Patient taken to pharmacy, prescriptions picked up and wheeled to personal vehicle.    
Physical Therapy  Facility/Department: [facility] A2 CARD TELEMETRY  Physical Therapy Initial Assessment/Treatment    Name: Dinesh Partida  : 1965  MRN: [id number]  Date of Service: 2025    Discharge Recommendations:  Home with assist PRN, Home with Home health PT (discussed recommendations with pt, pt declining HHPT this date)   PT Equipment Recommendations  Equipment Needed: No      Patient Diagnosis(es): The primary encounter diagnosis was Acute hypoxic respiratory failure. Diagnoses of Acute on chronic congestive heart failure, unspecified heart failure type (HCC) and Congestive heart failure, unspecified HF chronicity, unspecified heart failure type (HCC) were also pertinent to this visit.  Past Medical History:  has a past medical history of Asthma, CAD (coronary artery disease), CHF (congestive heart failure) (HCC), Hyperlipidemia, Hypertension, and SINDY (obstructive sleep apnea).  Past Surgical History:  has a past surgical history that includes Pacemaker insertion (Left) and Cardiac electrophysiology study and ablation.    Assessment  Body Structures, Functions, Activity Limitations Requiring Skilled Therapeutic Intervention: Decreased functional mobility ;Decreased endurance;Decreased strength  Assessment: Pt is a 59 y.o. male presenting with acute hypoxic respiratory failure. Pt reports being IND at baseline without AD. Pt currently requiring SBA for transfers and gait with no AD, pt wife assisted with supine to sit transfer. SpO2 remained 94% throughout session. CHF packet provided and edu on energy conservation techniques. Recommend home with PRN/HHPT at [place]. Pt would benefit from skilled PT services to address current functional deficits.  Treatment Diagnosis: Impaired functional mobility  Therapy Prognosis: Good  Decision Making: Medium Complexity  Requires PT Follow-Up: Yes  Activity Tolerance  Activity Tolerance: Patient tolerated treatment well;Patient limited by endurance    Plan  Physical 
Physical Therapy  Facility/Department: [facility] A2 CARD TELEMETRY  Daily Treatment Note  NAME: Dinesh Partida  : 1965  MRN: [id number]    Date of Service: 2025    Discharge Recommendations:  Home with assist PRN   PT Equipment Recommendations  Equipment Needed: No    Patient Diagnosis(es): The primary encounter diagnosis was Acute hypoxic respiratory failure. Diagnoses of Acute on chronic congestive heart failure, unspecified heart failure type (HCC) and Congestive heart failure, unspecified HF chronicity, unspecified heart failure type (HCC) were also pertinent to this visit.    Assessment  Assessment: Pt demos all mobility/amb at indep level.  He was able to amb ' on RA w/ O2 SATs at 91%. CHF packet provided and edu on energy conservation techniques. Recommend home with PRN Aat dc. Pt has met PT goals at [facility] level and will be D/C from PT services.  Activity Tolerance: Patient tolerated treatment well  Equipment Needed: No    Plan  Physical Therapy Plan  General Plan: Discharge    Restrictions  Restrictions/Precautions  Restrictions/Precautions: Fall Risk, General Precautions  Activity Level: Up with Assist  Required Braces or Orthoses?: No  Position Activity Restriction  Other Position/Activity Restrictions: IV, tele, 4L O2     Subjective   Subjective  Subjective: Pt agrees to PT session  Pain: no    Objective  Vitals  Pulse: 70  BP: 137/85  MAP (Calculated): 102  SpO2: 94 %  O2 Device: Nasal cannula (1L)  Comment: Pt after 2nd walk in [facility], trialed on RA per RN, pt O2 SATs 91%.  Bed Mobility Training  Bed Mobility Training: Yes  Supine to Sit: Independent  Scooting: Independent  Balance  Sitting: Intact  Standing: Intact  Transfer Training  Transfer Training: Yes  Overall Level of Assistance: Independent  Sit to Stand: Independent  Stand to Sit: Independent  Gait  Gait Training: Yes  Overall Level of Assistance: Modified independent (A for O2 management 1st walk, pt on RA 2nd walk and Indep 
Pt placed on 4L NC at this time per MD. Will monitor to place back on BiPAP if needed. Pt O2 sat 93%. Will notify RT.  
Pt was left on bipap for CT trip, from there  pt was transported to A2 218, pt comfortable and still on bipap at this time     
Spoke with cross cover, chest CT to r/o PE prior to admission.  STAT CT ordered.  Will transfer pt to floor when completed.  
injected and is moderate in size. The vessel exhibits no significant angiographic disease. Ost Cx to Mid Cx lesion is 100% stenosed. The lesion is chronically occluded. Third Obtuse Marginal Branch: 3rd Mrg filled by collaterals from 1st Diag.   Right Coronary Artery: Prox RCA lesion is 100% stenosed. The lesion is chronically occluded. Dist RCA lesion is 90% stenosed. Right Posterior Descending Artery: RPDA filled by collaterals from 2nd Sept.     From [facility] Documentation:  2024 ECG - SR, LBBB, PVC's,  ms   3/27/2024 ECG:  bpm  2023 LHC: 2 V CAD involving the LAD and RCA; LAD stenosis demonstrated with hemodynamic assessment. RCA collateralization from LAD seen. Normal LVEDP.  EK2019 - VT, HR into 210  Echocardiogram: 2022 - technically difficult study. My review suggest EF 25-30% (poor endocardial definition, no contrast used).  Echocardiogram: 2021 - EF 25-30%, mod global HK, trace MR  Echocardiogram: 2019 - EF <20%  LHC: 2019 - LM - ok; LAD - 30%; LCx - ok; RCA - prox 100%, with L to R collaterals.  Event monitor: 10/2019 - SR, runs of VT vs SVT with aberrancy  Echocardiogram: 2019 - EF 30-35%, severe LAE, mild MR, mild AI    VT ablation - 2024, [facility]   Cardioversion - 2023  CCM - 2023  Upgrade to CRT-D with LB lead - 10/2021  D-ICD - 2019, [employer]       25 0518 132.6 kg (292 lb 7 oz) Standing scale      25 0525 134.1 kg (295 lb 9.6 oz) --     25 0503 135.8 kg (299 lb 7 oz) --     25 0231 137.6 kg (303 lb 5.7 oz) Bed scale     25 220 129.7 kg (286 lb) Bed scale     Telemetry independently reviewed and interpreted by me today and shows: normal pacemaker rhythm    Principal Problem:    Acute hypoxic respiratory failure  Resolved Problems:    * No resolved hospital problems. *      Assessment:  Acute on chronic heart failure reduced ejection fraction  Ischemic cardiomyopathy  S/p CRT-D  History of VT/VF on 
Regular; 4 carb choices (60 gm/meal)    DVT Prophylaxis: []PPx LMWH  []SQ Heparin  [x]IPC/SCDs  []Eliquis  []Xarelto  []Coumadin  [] Heparin Drip  []Other -  eliquis on hold     Code status: Full Code    PT/OT Eval Status:   []NOT yet ordered  [x]Ordered and Pending   []Seen with Recommendations for:   []Home independently  []Home w/ assist  []HHC  []SNF  []Acute Rehab    Multi-Disciplinary Rounds with Case Management completed on 2/5/2025 with the following recommendations:  Anticipated Discharge Location: []Home w/ []HHC vs []SNF  []Acute Rehab  []LTAC  []Hospice  []Other -    Anticipated Discharge Day/Date:  3 - days  Barriers to Discharge: chf / resp failure   --------------------------------------------------    MDM (any 2 required for High level billing)    A. Problems (any 1)  [x] Acute/Chronic Illness/injury posing ongoing threat to life and/or bodily function without ongoing treatment    [] Severe exacerbation of chronic illness    --------------------------------------------------  B. Risk of Treatment (any 1)    [x] Drugs/treatments that require intensive monitoring for toxicity    [x] IV ABX (Vancomycin, Aminoglycosides, etc)     [] Post-Cath/Contrast study requiring serial monitoring    [] IV Narcotic analgesia    [x] Aggressive IV diuresis    [] Hypertonic Saline    [x] Critical electrolyte abnormalities requiring IV replacement    [x] Insulin - Scheduled/SSI or Insulin gtt    [] Anticoagulation (Heparin gtt or Coumadin - other anticoagulants in special circumstances)    [x] Cardiac Medications (IV Amiodarone/Diltiazem, Tikosyn, etc)    [] Hemodialysis    [] Other -    [] Change in code status    [] Decision to escalate care    [] Major surgery/procedure with associated risk factors    --------------------------------------------------  C. Data (any 2)    [x] Data Review (any 3)    [x] Consultant notes from yesterday/today    [x] All available current labs reviewed interpreted for clinical significance

## 2025-02-07 NOTE — PLAN OF CARE
CHF Care Plan      Patient's EF (Ejection Fraction) is less than 40%    Heart Failure Medications:  Diuretics:: Furosemide and Spironolactone    (One of the following REQUIRED for EF </= 40%/SYSTOLIC FAILURE but MAY be used in EF% >40%/DIASTOLIC FAILURE)        ACE:: None        ARB:: None         ARNI:: Sacubitril/Valsartan-Entresto    (Beta Blockers)  NON- Evidenced Based Beta Blocker (for EF% >40%/DIASTOLIC FAILURE): None    Evidenced Based Beta Blocker::(REQUIRED for EF% <40%/SYSTOLIC FAILURE) Metoprolol SUCCinate- Toprol XL  ...................................................................................................................................................    Failed to redirect to the Timeline version of the [employer] SmartLink.      Patient's weights and intake/output reviewed    Daily Weight log at bedside, patient/family participation in use of log: \"yes    Patient's current weight today shows a difference of 6 lbs more than last documented weight.      Intake/Output Summary (Last 24 hours) at 2/7/2025 1707  Last data filed at 2/7/2025 0446  Gross per 24 hour   Intake 600 ml   Output 1350 ml   Net -750 ml       Education Booklet Provided: yes    Comorbidities Reviewed Yes    Patient has a past medical history of Asthma, CAD (coronary artery disease), CHF (congestive heart failure) (HCC), Hyperlipidemia, Hypertension, and SINDY (obstructive sleep apnea).     >>For CHF and Comorbidity documentation on Education Time and Topics, please see Education Tab      CHF Education    Learners:  Patient and Family  Readineess:   Acceptance  Method:   Explanation  Response:   Verbalizes Understanding  Comments: None    Time Spent: 5 min      Pt resting in bed at this time on room air. Pt with complaints of shortness of breath. Pt with nonpitting lower extremity edema.     Patient and/or Family's stated Goal of Care this Admission: reduce shortness of breath, increase activity tolerance, better understand heart

## 2025-02-10 ENCOUNTER — FOLLOWUP TELEPHONE ENCOUNTER (OUTPATIENT)
Dept: TELEMETRY | Age: 60
End: 2025-02-10

## 2025-02-10 NOTE — TELEPHONE ENCOUNTER
2nd Attempt; No Answer- Left HIPAA compliant voicemail with Non-Urgent Heart Failure Resource Line number for call back.     Iris Musa RN

## 2025-02-10 NOTE — TELEPHONE ENCOUNTER
3rd Attempt; No Answer- Left HIPAA compliant voicemail with Non-Urgent Heart Failure Resource Line number for call back.     Iris Musa RN

## 2025-02-10 NOTE — TELEPHONE ENCOUNTER
1st Attempt; No Answer- Left HIPAA compliant voicemail with Non-Urgent Heart Failure Resource Line number for call back.     Iris Musa RN

## 2025-03-07 ENCOUNTER — HOSPITAL ENCOUNTER (INPATIENT)
Age: 60
LOS: 5 days | Discharge: HOME | DRG: 189 | End: 2025-03-13
Attending: EMERGENCY MEDICINE | Admitting: HOSPITALIST
Payer: MEDICARE

## 2025-03-07 ENCOUNTER — APPOINTMENT (OUTPATIENT)
Dept: CT IMAGING | Age: 60
DRG: 189 | End: 2025-03-07
Payer: MEDICARE

## 2025-03-07 ENCOUNTER — APPOINTMENT (OUTPATIENT)
Dept: GENERAL RADIOLOGY | Age: 60
DRG: 189 | End: 2025-03-07
Payer: MEDICARE

## 2025-03-07 DIAGNOSIS — R09.02 HYPOXIA: Primary | ICD-10-CM

## 2025-03-07 DIAGNOSIS — I50.9 ACUTE ON CHRONIC CONGESTIVE HEART FAILURE, UNSPECIFIED HEART FAILURE TYPE (HCC): ICD-10-CM

## 2025-03-07 DIAGNOSIS — I50.9 CONGESTIVE HEART FAILURE, UNSPECIFIED HF CHRONICITY, UNSPECIFIED HEART FAILURE TYPE (HCC): ICD-10-CM

## 2025-03-07 LAB
ALBUMIN SERPL-MCNC: 4.2 G/DL (ref 3.4–5)
ALBUMIN/GLOB SERPL: 1.2 {RATIO} (ref 1.1–2.2)
ALP SERPL-CCNC: 100 U/L (ref 40–129)
ALT SERPL-CCNC: 160 U/L (ref 10–40)
ANION GAP SERPL CALCULATED.3IONS-SCNC: 14 MMOL/L (ref 3–16)
AST SERPL-CCNC: 57 U/L (ref 15–37)
BASE EXCESS BLDV CALC-SCNC: -4.2 MMOL/L (ref -3–3)
BASOPHILS # BLD: 0.1 K/UL (ref 0–0.2)
BASOPHILS NFR BLD: 0.8 %
BILIRUB SERPL-MCNC: 0.4 MG/DL (ref 0–1)
BUN SERPL-MCNC: 30 MG/DL (ref 7–20)
CALCIUM SERPL-MCNC: 9.8 MG/DL (ref 8.3–10.6)
CHLORIDE SERPL-SCNC: 100 MMOL/L (ref 99–110)
CO2 BLDV-SCNC: 24 MMOL/L
CO2 SERPL-SCNC: 23 MMOL/L (ref 21–32)
COHGB MFR BLDV: 2.3 % (ref 0–1.5)
CREAT SERPL-MCNC: 1.8 MG/DL (ref 0.8–1.3)
D-DIMER QUANTITATIVE: 0.35 UG/ML FEU (ref 0–0.6)
DEPRECATED RDW RBC AUTO: 17.4 % (ref 12.4–15.4)
EOSINOPHIL # BLD: 0.1 K/UL (ref 0–0.6)
EOSINOPHIL NFR BLD: 0.7 %
FLUAV RNA RESP QL NAA+PROBE: NOT DETECTED
FLUBV RNA RESP QL NAA+PROBE: NOT DETECTED
GFR SERPLBLD CREATININE-BSD FMLA CKD-EPI: 42 ML/MIN/{1.73_M2}
GLUCOSE SERPL-MCNC: 157 MG/DL (ref 70–99)
HCO3 BLDV-SCNC: 22.4 MMOL/L (ref 23–29)
HCT VFR BLD AUTO: 52.7 % (ref 40.5–52.5)
HGB BLD-MCNC: 17.5 G/DL (ref 13.5–17.5)
LYMPHOCYTES # BLD: 1.1 K/UL (ref 1–5.1)
LYMPHOCYTES NFR BLD: 14.2 %
MCH RBC QN AUTO: 29.1 PG (ref 26–34)
MCHC RBC AUTO-ENTMCNC: 33.1 G/DL (ref 31–36)
MCV RBC AUTO: 87.8 FL (ref 80–100)
METHGB MFR BLDV: 0.3 %
MONOCYTES # BLD: 0.6 K/UL (ref 0–1.3)
MONOCYTES NFR BLD: 8.2 %
NEUTROPHILS # BLD: 5.7 K/UL (ref 1.7–7.7)
NEUTROPHILS NFR BLD: 76.1 %
NT-PROBNP SERPL-MCNC: 650 PG/ML (ref 0–124)
O2 THERAPY: ABNORMAL
PCO2 BLDV: 46.1 MMHG (ref 40–50)
PH BLDV: 7.3 [PH] (ref 7.35–7.45)
PLATELET # BLD AUTO: 216 K/UL (ref 135–450)
PMV BLD AUTO: 7.8 FL (ref 5–10.5)
PO2 BLDV: 34.1 MMHG (ref 25–40)
POTASSIUM SERPL-SCNC: 4.5 MMOL/L (ref 3.5–5.1)
PROT SERPL-MCNC: 7.6 G/DL (ref 6.4–8.2)
RBC # BLD AUTO: 6 M/UL (ref 4.2–5.9)
SAO2 % BLDV: 61 %
SARS-COV-2 RNA RESP QL NAA+PROBE: NOT DETECTED
SODIUM SERPL-SCNC: 137 MMOL/L (ref 136–145)
TROPONIN, HIGH SENSITIVITY: 20 NG/L (ref 0–22)
WBC # BLD AUTO: 7.6 K/UL (ref 4–11)

## 2025-03-07 PROCEDURE — 6360000002 HC RX W HCPCS: Performed by: EMERGENCY MEDICINE

## 2025-03-07 PROCEDURE — 6360000004 HC RX CONTRAST MEDICATION: Performed by: EMERGENCY MEDICINE

## 2025-03-07 PROCEDURE — 80053 COMPREHEN METABOLIC PANEL: CPT

## 2025-03-07 PROCEDURE — 85025 COMPLETE CBC W/AUTO DIFF WBC: CPT

## 2025-03-07 PROCEDURE — 94640 AIRWAY INHALATION TREATMENT: CPT

## 2025-03-07 PROCEDURE — 36415 COLL VENOUS BLD VENIPUNCTURE: CPT

## 2025-03-07 PROCEDURE — 2700000000 HC OXYGEN THERAPY PER DAY

## 2025-03-07 PROCEDURE — 82803 BLOOD GASES ANY COMBINATION: CPT

## 2025-03-07 PROCEDURE — 99285 EMERGENCY DEPT VISIT HI MDM: CPT

## 2025-03-07 PROCEDURE — 6370000000 HC RX 637 (ALT 250 FOR IP): Performed by: EMERGENCY MEDICINE

## 2025-03-07 PROCEDURE — 71260 CT THORAX DX C+: CPT

## 2025-03-07 PROCEDURE — 94761 N-INVAS EAR/PLS OXIMETRY MLT: CPT

## 2025-03-07 PROCEDURE — 84484 ASSAY OF TROPONIN QUANT: CPT

## 2025-03-07 PROCEDURE — 87636 SARSCOV2 & INF A&B AMP PRB: CPT

## 2025-03-07 PROCEDURE — 85379 FIBRIN DEGRADATION QUANT: CPT

## 2025-03-07 PROCEDURE — 83880 ASSAY OF NATRIURETIC PEPTIDE: CPT

## 2025-03-07 PROCEDURE — 2500000003 HC RX 250 WO HCPCS: Performed by: EMERGENCY MEDICINE

## 2025-03-07 PROCEDURE — 71045 X-RAY EXAM CHEST 1 VIEW: CPT

## 2025-03-07 PROCEDURE — 93005 ELECTROCARDIOGRAM TRACING: CPT | Performed by: EMERGENCY MEDICINE

## 2025-03-07 PROCEDURE — 96374 THER/PROPH/DIAG INJ IV PUSH: CPT

## 2025-03-07 RX ORDER — IPRATROPIUM BROMIDE AND ALBUTEROL SULFATE 2.5; .5 MG/3ML; MG/3ML
1 SOLUTION RESPIRATORY (INHALATION) ONCE
Status: COMPLETED | OUTPATIENT
Start: 2025-03-07 | End: 2025-03-07

## 2025-03-07 RX ORDER — IOPAMIDOL 755 MG/ML
75 INJECTION, SOLUTION INTRAVASCULAR
Status: COMPLETED | OUTPATIENT
Start: 2025-03-07 | End: 2025-03-07

## 2025-03-07 RX ADMIN — IPRATROPIUM BROMIDE AND ALBUTEROL SULFATE 1 DOSE: 2.5; .5 SOLUTION RESPIRATORY (INHALATION) at 23:17

## 2025-03-07 RX ADMIN — IOPAMIDOL 75 ML: 755 INJECTION, SOLUTION INTRAVENOUS at 23:02

## 2025-03-07 RX ADMIN — METHYLPREDNISOLONE SODIUM SUCCINATE 125 MG: 125 INJECTION INTRAMUSCULAR; INTRAVENOUS at 21:54

## 2025-03-07 ASSESSMENT — PAIN SCALES - GENERAL: PAINLEVEL_OUTOF10: 6

## 2025-03-07 ASSESSMENT — PAIN DESCRIPTION - LOCATION: LOCATION: NECK

## 2025-03-07 ASSESSMENT — PAIN - FUNCTIONAL ASSESSMENT: PAIN_FUNCTIONAL_ASSESSMENT: 0-10

## 2025-03-08 ENCOUNTER — APPOINTMENT (OUTPATIENT)
Dept: ULTRASOUND IMAGING | Age: 60
DRG: 189 | End: 2025-03-08
Payer: MEDICARE

## 2025-03-08 LAB
ANION GAP SERPL CALCULATED.3IONS-SCNC: 19 MMOL/L (ref 3–16)
BUN SERPL-MCNC: 33 MG/DL (ref 7–20)
CALCIUM SERPL-MCNC: 9.8 MG/DL (ref 8.3–10.6)
CHLORIDE SERPL-SCNC: 100 MMOL/L (ref 99–110)
CHLORIDE UR-SCNC: 33 MMOL/L
CO2 SERPL-SCNC: 17 MMOL/L (ref 21–32)
CREAT SERPL-MCNC: 2 MG/DL (ref 0.8–1.3)
CREAT UR-MCNC: 58.1 MG/DL (ref 39–259)
EKG ATRIAL RATE: 74 BPM
EKG DIAGNOSIS: NORMAL
EKG P AXIS: 25 DEGREES
EKG P-R INTERVAL: 242 MS
EKG Q-T INTERVAL: 462 MS
EKG QRS DURATION: 172 MS
EKG QTC CALCULATION (BAZETT): 512 MS
EKG R AXIS: 74 DEGREES
EKG T AXIS: -42 DEGREES
EKG VENTRICULAR RATE: 74 BPM
GFR SERPLBLD CREATININE-BSD FMLA CKD-EPI: 37 ML/MIN/{1.73_M2}
GLUCOSE BLD-MCNC: 209 MG/DL (ref 70–99)
GLUCOSE BLD-MCNC: 227 MG/DL (ref 70–99)
GLUCOSE BLD-MCNC: 236 MG/DL (ref 70–99)
GLUCOSE BLD-MCNC: 245 MG/DL (ref 70–99)
GLUCOSE SERPL-MCNC: 254 MG/DL (ref 70–99)
MAGNESIUM SERPL-MCNC: 2.57 MG/DL (ref 1.8–2.4)
PERFORMED ON: ABNORMAL
POTASSIUM SERPL-SCNC: 4.8 MMOL/L (ref 3.5–5.1)
POTASSIUM UR-SCNC: 41.1 MMOL/L
SODIUM SERPL-SCNC: 136 MMOL/L (ref 136–145)
SODIUM UR-SCNC: 35 MMOL/L
TROPONIN, HIGH SENSITIVITY: 16 NG/L (ref 0–22)

## 2025-03-08 PROCEDURE — 6360000002 HC RX W HCPCS: Performed by: EMERGENCY MEDICINE

## 2025-03-08 PROCEDURE — 2700000000 HC OXYGEN THERAPY PER DAY

## 2025-03-08 PROCEDURE — 2580000003 HC RX 258: Performed by: EMERGENCY MEDICINE

## 2025-03-08 PROCEDURE — 6360000002 HC RX W HCPCS: Performed by: INTERNAL MEDICINE

## 2025-03-08 PROCEDURE — 6370000000 HC RX 637 (ALT 250 FOR IP): Performed by: NURSE PRACTITIONER

## 2025-03-08 PROCEDURE — 6370000000 HC RX 637 (ALT 250 FOR IP)

## 2025-03-08 PROCEDURE — 93010 ELECTROCARDIOGRAM REPORT: CPT | Performed by: INTERNAL MEDICINE

## 2025-03-08 PROCEDURE — 36415 COLL VENOUS BLD VENIPUNCTURE: CPT

## 2025-03-08 PROCEDURE — 6370000000 HC RX 637 (ALT 250 FOR IP): Performed by: INTERNAL MEDICINE

## 2025-03-08 PROCEDURE — 84540 ASSAY OF URINE/UREA-N: CPT

## 2025-03-08 PROCEDURE — 2580000003 HC RX 258: Performed by: INTERNAL MEDICINE

## 2025-03-08 PROCEDURE — 84133 ASSAY OF URINE POTASSIUM: CPT

## 2025-03-08 PROCEDURE — 94761 N-INVAS EAR/PLS OXIMETRY MLT: CPT

## 2025-03-08 PROCEDURE — 2500000003 HC RX 250 WO HCPCS: Performed by: NURSE PRACTITIONER

## 2025-03-08 PROCEDURE — 82436 ASSAY OF URINE CHLORIDE: CPT

## 2025-03-08 PROCEDURE — 82570 ASSAY OF URINE CREATININE: CPT

## 2025-03-08 PROCEDURE — 80048 BASIC METABOLIC PNL TOTAL CA: CPT

## 2025-03-08 PROCEDURE — 1200000000 HC SEMI PRIVATE

## 2025-03-08 PROCEDURE — 6370000000 HC RX 637 (ALT 250 FOR IP): Performed by: EMERGENCY MEDICINE

## 2025-03-08 PROCEDURE — 2500000003 HC RX 250 WO HCPCS: Performed by: INTERNAL MEDICINE

## 2025-03-08 PROCEDURE — 94660 CPAP INITIATION&MGMT: CPT

## 2025-03-08 PROCEDURE — 99223 1ST HOSP IP/OBS HIGH 75: CPT | Performed by: INTERNAL MEDICINE

## 2025-03-08 PROCEDURE — 83735 ASSAY OF MAGNESIUM: CPT

## 2025-03-08 PROCEDURE — 84300 ASSAY OF URINE SODIUM: CPT

## 2025-03-08 PROCEDURE — 76770 US EXAM ABDO BACK WALL COMP: CPT

## 2025-03-08 PROCEDURE — 5A09457 ASSISTANCE WITH RESPIRATORY VENTILATION, 24-96 CONSECUTIVE HOURS, CONTINUOUS POSITIVE AIRWAY PRESSURE: ICD-10-PCS

## 2025-03-08 PROCEDURE — 94640 AIRWAY INHALATION TREATMENT: CPT

## 2025-03-08 RX ORDER — ALBUTEROL SULFATE 5 MG/ML
2.5 SOLUTION RESPIRATORY (INHALATION) EVERY 6 HOURS PRN
Status: DISCONTINUED | OUTPATIENT
Start: 2025-03-08 | End: 2025-03-14 | Stop reason: HOSPADM

## 2025-03-08 RX ORDER — SODIUM CHLORIDE 0.9 % (FLUSH) 0.9 %
5-40 SYRINGE (ML) INJECTION EVERY 12 HOURS SCHEDULED
Status: DISCONTINUED | OUTPATIENT
Start: 2025-03-08 | End: 2025-03-14 | Stop reason: HOSPADM

## 2025-03-08 RX ORDER — ACETAMINOPHEN 325 MG/1
650 TABLET ORAL EVERY 6 HOURS PRN
Status: DISCONTINUED | OUTPATIENT
Start: 2025-03-08 | End: 2025-03-14 | Stop reason: HOSPADM

## 2025-03-08 RX ORDER — INSULIN LISPRO 100 [IU]/ML
0-16 INJECTION, SOLUTION INTRAVENOUS; SUBCUTANEOUS
Status: DISCONTINUED | OUTPATIENT
Start: 2025-03-08 | End: 2025-03-14 | Stop reason: HOSPADM

## 2025-03-08 RX ORDER — AZELASTINE 1 MG/ML
1 SPRAY, METERED NASAL 2 TIMES DAILY
Status: DISCONTINUED | OUTPATIENT
Start: 2025-03-08 | End: 2025-03-14 | Stop reason: HOSPADM

## 2025-03-08 RX ORDER — BUSPIRONE HYDROCHLORIDE 5 MG/1
5 TABLET ORAL 3 TIMES DAILY
Status: DISCONTINUED | OUTPATIENT
Start: 2025-03-08 | End: 2025-03-08

## 2025-03-08 RX ORDER — FUROSEMIDE 10 MG/ML
40 INJECTION INTRAMUSCULAR; INTRAVENOUS ONCE
Status: COMPLETED | OUTPATIENT
Start: 2025-03-08 | End: 2025-03-08

## 2025-03-08 RX ORDER — SODIUM CHLORIDE 9 MG/ML
INJECTION, SOLUTION INTRAVENOUS PRN
Status: DISCONTINUED | OUTPATIENT
Start: 2025-03-08 | End: 2025-03-14 | Stop reason: HOSPADM

## 2025-03-08 RX ORDER — AMIODARONE HYDROCHLORIDE 200 MG/1
400 TABLET ORAL 2 TIMES DAILY
Status: DISCONTINUED | OUTPATIENT
Start: 2025-03-08 | End: 2025-03-10

## 2025-03-08 RX ORDER — ALBUTEROL SULFATE 90 UG/1
2 INHALANT RESPIRATORY (INHALATION) EVERY 4 HOURS PRN
Status: DISCONTINUED | OUTPATIENT
Start: 2025-03-08 | End: 2025-03-08

## 2025-03-08 RX ORDER — VANCOMYCIN HYDROCHLORIDE 1.5 G/300ML
1500 INJECTION, SOLUTION INTRAVITREAL ONCE
Status: DISCONTINUED | OUTPATIENT
Start: 2025-03-08 | End: 2025-03-08

## 2025-03-08 RX ORDER — DEXTROSE MONOHYDRATE 100 MG/ML
INJECTION, SOLUTION INTRAVENOUS CONTINUOUS PRN
Status: DISCONTINUED | OUTPATIENT
Start: 2025-03-08 | End: 2025-03-14 | Stop reason: HOSPADM

## 2025-03-08 RX ORDER — MONTELUKAST SODIUM 10 MG/1
10 TABLET ORAL NIGHTLY
Status: DISCONTINUED | OUTPATIENT
Start: 2025-03-08 | End: 2025-03-14 | Stop reason: HOSPADM

## 2025-03-08 RX ORDER — BUDESONIDE 0.5 MG/2ML
0.5 INHALANT ORAL
Status: DISCONTINUED | OUTPATIENT
Start: 2025-03-08 | End: 2025-03-14 | Stop reason: HOSPADM

## 2025-03-08 RX ORDER — DOXYCYCLINE HYCLATE 100 MG
100 TABLET ORAL EVERY 12 HOURS SCHEDULED
Status: COMPLETED | OUTPATIENT
Start: 2025-03-08 | End: 2025-03-12

## 2025-03-08 RX ORDER — INSULIN LISPRO 100 [IU]/ML
0-4 INJECTION, SOLUTION INTRAVENOUS; SUBCUTANEOUS
Status: DISCONTINUED | OUTPATIENT
Start: 2025-03-08 | End: 2025-03-08

## 2025-03-08 RX ORDER — GLUCAGON 1 MG/ML
1 KIT INJECTION PRN
Status: DISCONTINUED | OUTPATIENT
Start: 2025-03-08 | End: 2025-03-14 | Stop reason: HOSPADM

## 2025-03-08 RX ORDER — IPRATROPIUM BROMIDE AND ALBUTEROL SULFATE 2.5; .5 MG/3ML; MG/3ML
1 SOLUTION RESPIRATORY (INHALATION)
Status: DISCONTINUED | OUTPATIENT
Start: 2025-03-08 | End: 2025-03-08

## 2025-03-08 RX ORDER — POLYETHYLENE GLYCOL 3350 17 G/17G
17 POWDER, FOR SOLUTION ORAL DAILY PRN
Status: DISCONTINUED | OUTPATIENT
Start: 2025-03-08 | End: 2025-03-14 | Stop reason: HOSPADM

## 2025-03-08 RX ORDER — FLUTICASONE PROPIONATE 50 MCG
1 SPRAY, SUSPENSION (ML) NASAL DAILY PRN
Status: DISCONTINUED | OUTPATIENT
Start: 2025-03-08 | End: 2025-03-14 | Stop reason: HOSPADM

## 2025-03-08 RX ORDER — TRANEXAMIC ACID 100 MG/ML
500 INJECTION, SOLUTION INTRAVENOUS EVERY 8 HOURS
Status: DISCONTINUED | OUTPATIENT
Start: 2025-03-08 | End: 2025-03-09 | Stop reason: SDUPTHER

## 2025-03-08 RX ORDER — SPIRONOLACTONE 25 MG/1
12.5 TABLET ORAL DAILY
Status: DISCONTINUED | OUTPATIENT
Start: 2025-03-08 | End: 2025-03-08

## 2025-03-08 RX ORDER — ACETAMINOPHEN 650 MG/1
650 SUPPOSITORY RECTAL EVERY 6 HOURS PRN
Status: DISCONTINUED | OUTPATIENT
Start: 2025-03-08 | End: 2025-03-14 | Stop reason: HOSPADM

## 2025-03-08 RX ORDER — PREDNISONE 20 MG/1
60 TABLET ORAL ONCE
Status: COMPLETED | OUTPATIENT
Start: 2025-03-08 | End: 2025-03-08

## 2025-03-08 RX ORDER — IPRATROPIUM BROMIDE 21 UG/1
2 SPRAY, METERED NASAL 2 TIMES DAILY
Status: DISCONTINUED | OUTPATIENT
Start: 2025-03-08 | End: 2025-03-14 | Stop reason: HOSPADM

## 2025-03-08 RX ORDER — VANCOMYCIN 1.5 G/300ML
1500 INJECTION, SOLUTION INTRAVENOUS ONCE
Status: COMPLETED | OUTPATIENT
Start: 2025-03-08 | End: 2025-03-08

## 2025-03-08 RX ORDER — PAROXETINE 20 MG/1
20 TABLET, FILM COATED ORAL DAILY
Status: DISCONTINUED | OUTPATIENT
Start: 2025-03-08 | End: 2025-03-14 | Stop reason: HOSPADM

## 2025-03-08 RX ORDER — PREDNISONE 20 MG/1
40 TABLET ORAL DAILY
Status: DISCONTINUED | OUTPATIENT
Start: 2025-03-09 | End: 2025-03-10

## 2025-03-08 RX ORDER — PROCHLORPERAZINE EDISYLATE 5 MG/ML
10 INJECTION INTRAMUSCULAR; INTRAVENOUS EVERY 6 HOURS PRN
Status: DISCONTINUED | OUTPATIENT
Start: 2025-03-08 | End: 2025-03-14 | Stop reason: HOSPADM

## 2025-03-08 RX ORDER — SODIUM CHLORIDE 0.9 % (FLUSH) 0.9 %
5-40 SYRINGE (ML) INJECTION PRN
Status: DISCONTINUED | OUTPATIENT
Start: 2025-03-08 | End: 2025-03-14 | Stop reason: HOSPADM

## 2025-03-08 RX ORDER — LORAZEPAM 1 MG/1
1 TABLET ORAL 3 TIMES DAILY PRN
Status: DISCONTINUED | OUTPATIENT
Start: 2025-03-08 | End: 2025-03-14 | Stop reason: HOSPADM

## 2025-03-08 RX ORDER — FUROSEMIDE 20 MG/1
20 TABLET ORAL DAILY
Status: DISCONTINUED | OUTPATIENT
Start: 2025-03-08 | End: 2025-03-08

## 2025-03-08 RX ORDER — METOPROLOL SUCCINATE 50 MG/1
50 TABLET, EXTENDED RELEASE ORAL DAILY
Status: DISCONTINUED | OUTPATIENT
Start: 2025-03-08 | End: 2025-03-14 | Stop reason: HOSPADM

## 2025-03-08 RX ADMIN — PREDNISONE 60 MG: 20 TABLET ORAL at 01:59

## 2025-03-08 RX ADMIN — IPRATROPIUM BROMIDE 2 SPRAY: 21 SPRAY NASAL at 21:12

## 2025-03-08 RX ADMIN — LORAZEPAM 1 MG: 1 TABLET ORAL at 04:05

## 2025-03-08 RX ADMIN — ACETAMINOPHEN 650 MG: 325 TABLET ORAL at 08:44

## 2025-03-08 RX ADMIN — EMPAGLIFLOZIN 10 MG: 10 TABLET, FILM COATED ORAL at 08:44

## 2025-03-08 RX ADMIN — SODIUM BICARBONATE: 84 INJECTION, SOLUTION INTRAVENOUS at 12:39

## 2025-03-08 RX ADMIN — PAROXETINE HYDROCHLORIDE 20 MG: 20 TABLET, FILM COATED ORAL at 08:44

## 2025-03-08 RX ADMIN — INSULIN LISPRO 4 UNITS: 100 INJECTION, SOLUTION INTRAVENOUS; SUBCUTANEOUS at 21:12

## 2025-03-08 RX ADMIN — FUROSEMIDE 40 MG: 10 INJECTION, SOLUTION INTRAMUSCULAR; INTRAVENOUS at 02:00

## 2025-03-08 RX ADMIN — CEFEPIME 2000 MG: 2 INJECTION, POWDER, FOR SOLUTION INTRAVENOUS at 02:06

## 2025-03-08 RX ADMIN — SODIUM CHLORIDE, PRESERVATIVE FREE 10 ML: 5 INJECTION INTRAVENOUS at 21:12

## 2025-03-08 RX ADMIN — LORAZEPAM 1 MG: 1 TABLET ORAL at 12:46

## 2025-03-08 RX ADMIN — FUROSEMIDE 20 MG: 20 TABLET ORAL at 08:44

## 2025-03-08 RX ADMIN — VANCOMYCIN 1500 MG: 1.5 INJECTION, SOLUTION INTRAVENOUS at 03:39

## 2025-03-08 RX ADMIN — MONTELUKAST 10 MG: 10 TABLET, FILM COATED ORAL at 20:43

## 2025-03-08 RX ADMIN — DOXYCYCLINE HYCLATE 100 MG: 100 TABLET, FILM COATED ORAL at 08:44

## 2025-03-08 RX ADMIN — APIXABAN 5 MG: 5 TABLET, FILM COATED ORAL at 08:44

## 2025-03-08 RX ADMIN — AMIODARONE HYDROCHLORIDE 400 MG: 200 TABLET ORAL at 08:44

## 2025-03-08 RX ADMIN — AMIODARONE HYDROCHLORIDE 400 MG: 200 TABLET ORAL at 20:43

## 2025-03-08 RX ADMIN — INSULIN LISPRO 4 UNITS: 100 INJECTION, SOLUTION INTRAVENOUS; SUBCUTANEOUS at 09:17

## 2025-03-08 RX ADMIN — DOXYCYCLINE HYCLATE 100 MG: 100 TABLET, FILM COATED ORAL at 20:43

## 2025-03-08 RX ADMIN — SODIUM CHLORIDE, PRESERVATIVE FREE 10 ML: 5 INJECTION INTRAVENOUS at 08:45

## 2025-03-08 RX ADMIN — Medication 10 ML: at 08:45

## 2025-03-08 RX ADMIN — IPRATROPIUM BROMIDE AND ALBUTEROL SULFATE 1 DOSE: 2.5; .5 SOLUTION RESPIRATORY (INHALATION) at 12:10

## 2025-03-08 RX ADMIN — BUDESONIDE 500 MCG: 0.5 SUSPENSION RESPIRATORY (INHALATION) at 20:45

## 2025-03-08 RX ADMIN — METOPROLOL SUCCINATE 50 MG: 50 TABLET, FILM COATED, EXTENDED RELEASE ORAL at 08:44

## 2025-03-08 RX ADMIN — INSULIN LISPRO 4 UNITS: 100 INJECTION, SOLUTION INTRAVENOUS; SUBCUTANEOUS at 18:01

## 2025-03-08 ASSESSMENT — PAIN SCALES - GENERAL
PAINLEVEL_OUTOF10: 6
PAINLEVEL_OUTOF10: 0

## 2025-03-08 ASSESSMENT — PAIN DESCRIPTION - LOCATION: LOCATION: BACK;NECK

## 2025-03-08 NOTE — ED NOTES
Dinesh Partida is a 59 y.o. male admitted for  Principal Problem:    Acute hypoxic respiratory failure (HCC)  Resolved Problems:    * No resolved hospital problems. *  .   Patient Home via EMS transportation with   Chief Complaint   Patient presents with    Shortness of Breath     PNA 1 month ago. C/o SOB w/ pink tinged blood w/ cough. 88% on RA.  Hx COPD, afib, and defibrillator. T 100 axillary per EMT. . EKG SR at 78.    .  Patient is alert and Person, Place, Time, and Situation  Patient's baseline mobility: Baseline Mobility: Independent   Code Status: Prior   Cardiac Rhythm:    O2 Flow Rate (L/min): 2 L/min  Is patient on baseline Oxygen: no how many Liters: na   Abnormal Assessment Findings: Pt is on RA at baseline, but does report that he uses a CPAP at night. This patient is currently on 2L NC for this visit.     Isolation: None      NIH Score:    C-SSRS: Risk of Suicide: No Risk  Bedside swallow:        Active LDA's:   Peripheral IV 03/07/25 Distal;Left Cephalic (Active)   Site Assessment Clean, dry & intact 03/07/25 2115   Line Status Normal saline locked 03/07/25 2115   Phlebitis Assessment No symptoms 03/07/25 2115   Infiltration Assessment 0 03/07/25 2115   Alcohol Cap Used Yes 03/07/25 2115   Dressing Status Clean, dry & intact 03/07/25 2115   Dressing Type Transparent 03/07/25 2115     Patient admitted with a dorsey: no If the dorsey is chronic was it exchanged:NA  Reason for dorsey: NA  Patient admitted with Central Line:  NA . PICC line placement confirmed: YES OR NO:[id number]}   Reason for Central line: NA  Was central line Inserted from an outside facility: no       Family/Caregiver Present NO Any Concerns: no   Restraints no  Sitter no         Vitals:      Vitals:    03/07/25 2227 03/07/25 2320 03/08/25 0135 03/08/25 0200   BP: 123/79   137/72   Pulse: 77 70 70    Resp: 17      Temp:       TempSrc:       SpO2: 93% 93% 92%    Weight:       Height:           Last documented pain score (0-10

## 2025-03-08 NOTE — CONSULTS
PULMONARY AND CRITICAL CARE INPATIENT NOTE        Dinesh Partida   : 1965  MRN: [id number]     Admitting Physician: Leighton Abdul MD  Attending Physician: Sergio Macias MD  PCP: Zee Patel MD    Date of Service: 3/8/2025  Admission date:3/7/2025   LOS: Hospital Day: 2   Code:Full Code      ASSESSMENT & PLAN       59 y.o. male patient was admitted on 3/7/2025 with  Chief Complaint   Patient presents with    Shortness of Breath     PNA 1 month ago. C/o SOB w/ pink tinged blood w/ cough. 88% on RA.  Hx COPD, afib, and defibrillator. T 100 axillary per EMT. . EKG SR at 78.        Recurrent mild hemoptysis  Recent pneumonia.  Improving follow-up imaging  Persistent sinus congestion with postnasal drip  A-fib on anticoagulation  Severe SINDY on CPAP with pressure of 16  COPD/emphysema    Obesity class III      Plan:              Hold apixaban  Respiratory medical panel  Ipratropium nasal spray  TXA nebulizer 3 times daily  Continue antibiotics pending micro results  Continue steroids  Pulmicort nebulizer  No plan for bronchoscopy currently but will consider if patient continues to worsen despite current measures  Will use hospital V60 to replace home CPAP while inpatient  Pulmonary toilet  Aspiration precautions      Subjective/Objective           CC/Reason for Consult: h/o CHF, CAD, here with acute hypoxia       HPI: 2025  59-year-old male patient with PMH of SNIDY on CPAP, COPD/emphysema with recent admission in early February with CHF/AECOPD/mild hemoptysis while on Eliquis for A-fib who was treated conservatively with empiric antibiotics and holding Eliquis who comes in with worsening shortness of breath and persistent blood-tinged phlegm on .  Patient was afebrile and hemodynamically stable in presentation requiring 2 L of oxygen  Chemistry showed elevated creatinine at 2.  Blood gas showed mild hypercapnic respiratory failure EKG showed sinus rhythm with  Extremities: No clubbing.  No signs of acute ischemia.  Neurological: No focal deficit.      ________________________________________________________  Electronically signed by:  Smita Levi MD,FACP    3/8/2025    7:16 AM.     [facility]  [address], [place] [address]   Phone (office): [phone]

## 2025-03-08 NOTE — CONSULTS
Patient seen and examined, consult note dictated.    Assessment and Plan:    1- CKD: The patient has chronic kidney disease with a baseline creatinine of 1.8 to 2 mg/dl. His urinary output is well maintained and his serum creatinine is currently at baseline.  - Hold all diuretics.  - Avoid all nephrotoxic agents at this time.  - Maintain systolic blood pressure > 120 mmHg.    2- No significant electrolytes disorders noted.    3- HTN: Blood pressure within acceptable range.    4- COPD exacerbation: Management per primary team.

## 2025-03-08 NOTE — PLAN OF CARE
CHF Care Plan      Patient's EF (Ejection Fraction) is  40% - no ECHO in chart    Heart Failure Medications:  Diuretics:: Furosemide    (One of the following REQUIRED for EF </= 40%/SYSTOLIC FAILURE but MAY be used in EF% >40%/DIASTOLIC FAILURE)        ACE:: None        ARB:: None         ARNI:: Sacubitril/Valsartan-Entresto    (Beta Blockers)  NON- Evidenced Based Beta Blocker (for EF% >40%/DIASTOLIC FAILURE): None    Evidenced Based Beta Blocker::(REQUIRED for EF% <40%/SYSTOLIC FAILURE) Metoprolol SUCCinate- Toprol XL  ...................................................................................................................................................    Failed to redirect to the Timeline version of the [employer] master SmartLink.      Patient's weights and intake/output reviewed    Daily Weight log at bedside, patient/family participation in use of log: \"yes    Patient's current weight today shows a difference of 7.4 lbs more than last documented weight.      Intake/Output Summary (Last 24 hours) at 3/8/2025 0537  Last data filed at 3/8/2025 0345  Gross per 24 hour   Intake 300 ml   Output 800 ml   Net -500 ml       Education Booklet Provided: yes    Comorbidities Reviewed Yes    Patient has a past medical history of Asthma, CAD (coronary artery disease), CHF (congestive heart failure) ([facility]), Hyperlipidemia, Hypertension, SINDY (obstructive sleep apnea), PAF (paroxysmal atrial fibrillation) (HCC), and Type 2 diabetes mellitus (HCC).     >>For CHF and Comorbidity documentation on Education Time and Topics, please see Education Tab      CHF Education    Learners:  Patient  Readineess:   Acceptance  Method:   Explanation  Response:   Verbalizes Understanding  Comments:     Time Spent: 10 mins      Pt resting in bed at this time on CPAP. Pt with complaints of shortness of breath. Pt  with trace  lower extremity edema.     Patient and/or Family's stated Goal of Care this Admission: reduce shortness of breath, increase  activity tolerance, better understand heart failure and disease management, be more comfortable, and reduce lower extremity edema prior to discharge        :

## 2025-03-08 NOTE — RT PROTOCOL NOTE
RT Inhaler-Nebulizer Bronchodilator Protocol Note    There is a bronchodilator order in the chart from a provider indicating to follow the RT Bronchodilator Protocol and there is an “Initiate RT Inhaler-Nebulizer Bronchodilator Protocol” order as well (see protocol at bottom of note).    CXR Findings:  XR CHEST PORTABLE  Result Date: 3/7/2025  Persistent or recurrent right lower lobe infiltrate. Electronically signed by Federico Haddad      The findings from the last RT Protocol Assessment were as follows:   History Pulmonary Disease: Chronic pulmonary disease  Respiratory Pattern: Dyspnea on exertion or RR 21-25 bpm  Breath Sounds: Slightly diminished and/or crackles  Cough: Strong, productive  Indication for Bronchodilator Therapy: On home bronchodilators  Bronchodilator Assessment Score: 7 PT request PRN    Aerosolized bronchodilator medication orders have been revised according to the RT Inhaler-Nebulizer Bronchodilator Protocol below.    Respiratory Therapist to perform RT Therapy Protocol Assessment initially then follow the protocol.  Repeat RT Therapy Protocol Assessment PRN for score 0-3 or on second treatment, BID, and PRN for scores above 3.    No Indications - adjust the frequency to every 6 hours PRN wheezing or bronchospasm, if no treatments needed after 48 hours then discontinue using Per Protocol order mode.     If indication present, adjust the RT bronchodilator orders based on the Bronchodilator Assessment Score as indicated below.  Use Inhaler orders unless patient has one or more of the following: on home nebulizer, not able to hold breath for 10 seconds, is not alert and oriented, cannot activate and use MDI correctly, or respiratory rate 25 breaths per minute or more, then use the equivalent nebulizer order(s) with same Frequency and PRN reasons based on the score.  If a patient is on this medication at home then do not decrease Frequency below that used at home.    0-3 - enter or revise RT  bronchodilator order(s) to equivalent RT Bronchodilator order with Frequency of every 4 hours PRN for wheezing or increased work of breathing using Per Protocol order mode.        4-6 - enter or revise RT Bronchodilator order(s) to two equivalent RT bronchodilator orders with one order with BID Frequency and one order with Frequency of every 4 hours PRN wheezing or increased work of breathing using Per Protocol order mode.        7-10 - enter or revise RT Bronchodilator order(s) to two equivalent RT bronchodilator orders with one order with TID Frequency and one order with Frequency of every 4 hours PRN wheezing or increased work of breathing using Per Protocol order mode.       11-13 - enter or revise RT Bronchodilator order(s) to one equivalent RT bronchodilator order with QID Frequency and an Albuterol order with Frequency of every 4 hours PRN wheezing or increased work of breathing using Per Protocol order mode.      Greater than 13 - enter or revise RT Bronchodilator order(s) to one equivalent RT bronchodilator order with every 4 hours Frequency and an Albuterol order with Frequency of every 2 hours PRN wheezing or increased work of breathing using Per Protocol order mode.     RT to enter RT Home Evaluation for COPD & MDI Assessment order using Per Protocol order mode.    Electronically signed by Elizabeth Hernandez RCP on 3/8/2025 at 3:19 AM

## 2025-03-08 NOTE — CONSULTS
Consult Placed     Who: Gurmeet  Date: 3/8/2025  Time: 1218     Electronically signed by Ele Lares RN on 3/8/2025 at 12:18 PM

## 2025-03-08 NOTE — PROGRESS NOTES
4 Eyes Skin Assessment and Patient belongings     The patient is being assess for  Admission    I agree that 2 Nurses have performed a thorough Head to Toe Skin Assessment on the patient. ALL assessment sites listed below have been assessed.       Areas assessed by both nurses:   [x]   Head, Face, and Ears   [x]   Shoulders, Back, and Chest  [x]   Arms, Elbows, and Hands   [x]   Coccyx, Sacrum, and IschIum  [x]   Legs, Feet, and Heels        Does the Patient have Skin Breakdown?  No         Thee Prevention initiated:  No   Wound Care Orders initiated:  No      [facility] nurse consulted for Pressure Injury (Stage 3,4, Unstageable, DTI, NWPT, and Complex wounds), New and Established Ostomies:  No      I agree that 2 Nurses have reviewed patient belongings with the patient/family and documented in the flowsheet upon admission or transfer to the unit.     Belongings  Dental Appliances: Uppers, Lowers  Vision - Corrective Lenses: Eyeglasses (READERS)  Hearing Aid: None  Clothing: Jacket/Coat, Shirt, Pants, Socks, Footwear  Jewelry: Bracelet  Electronic Devices: Cell Phone,   Weapons (Notify Protective Services/Security): None  Home Medications: None  Valuables Given To: Patient  Provide Name(s) of Who Valuable(s) Were Given To: NICHOLE ROSA       Nurse 1 eSignature: Electronically signed by Jackie Hernández RN on 3/8/25 at 3:59 AM EST    **SHARE this note so that the co-signing nurse is able to place an eSignature**    Nurse 2 eSignature: Electronically signed by Leah Matthews RN on 3/8/25 at 5:05 AM EST

## 2025-03-08 NOTE — H&P
Hospital Medicine History & Physical        Date of Service: 03/08/2025    Time of Service: 0150    Disposition:    [x]Admitted to inpatient status with expected LOS greater than two midnights due to medical therapy.  []Placed in observation status.    Historian: Information was obtained from patient, ED documentation, and use of Epic's chart review and Care Everywhere tabs    Chief Admission Complaint: Shortness of breath for 24 hours    Presenting Admission History:      Dinesh Partida is a/an 59 y.o. male with a significant past medical history of hypertension, hyperlipidemia, CAD, PAF, HFrEF, SINDY, and type 2 diabetes who presents to [facility]'s emergency department with complaint of 24 hours of shortness of breath that began sometime yesterday afternoon.  He notes that he has intermittent dyspnea, does not require supplemental O2 at home except during sleep with his CPAP. He was hypoxic on arrival with an SpO2 of 85% on RA, requiring 2LNC in the ED. Denies any particular symptoms aside from dyspnea, no palpitations, no chest pain, no cough no chest congestion, no weight gain, no BLE edema.  He admits this happened about a month ago after having dyspnea for a week he was admitted.  However this time, denies any other symptoms.  The dyspnea is noted mostly with exertion, but sometimes feels it at rest as well.  His evaluation here included laboratory studies, EKG, chest x-ray, and CT PE protocol.  CT of the chest did not show any acute PE, there are persistent but improving infiltrates bilaterally, cardiomegaly and emphysema noted.  Laboratory studies reviewed and pertinent for creatinine 1.8, glucose 1 7, proBNP 650, troponin 20 with repeat of 16, ALT 1 6, AST 57, WBC 7.6.  Flu and COVID testing were negative.  VBG showed pH 7.30, pCO2 46, pO2 34, HCO3 22.  Patient started on vancomycin and cefepime in ED along with a DuoNeb and Solu-Medrol 125mg IVP.  Hospital team consulted to

## 2025-03-08 NOTE — ED PROVIDER NOTES
60 mg  ONCE         Last MAR action: Given - by YANN SHIELDS on 03/08/25 at 0159 CHAITANYA KATZ    03/07/25 2241 03/07/25 2242  iopamidol (ISOVUE-370) 76 % injection 75 mL  IMG ONCE PRN         Last MAR action: Given - by DAMIAN RUTLEDGE on 03/07/25 at 2302 CHAITANYA KATZ    03/07/25 2200 03/07/25 2145  ipratropium 0.5 mg-albuterol 2.5 mg (DUONEB) nebulizer solution 1 Dose  ONCE        Question:  Initiate RT Bronchodilator Protocol  Answer:  No    Last MAR action: Given - by SAMANTA LEGER on 03/07/25 at 2317 CHAITANYA KATZ    03/07/25 2200 03/07/25 2145  methylPREDNISolone sodium succ (SOLU-MEDROL) 125 mg in sterile water 2 mL injection  ONCE         Last MAR action: Given - by ELMO CROWDER on 03/07/25 at 2154 CHAITANYA KATZ            EKG  Rhythm is sinus rhythm first-degree AV block  Rate is 75  Axis is normal  Conduction Abnormalities nonspecific intraventricular conduction delay  No STEMI criteria  Qtc is 512      Radiology  US RENAL COMPLETE  Result Date: 3/8/2025  EXAM: US RENAL COMPLETE INDICATION: Acute on chronic kidney injury COMPARISON: None TECHNIQUE: Grayscale and color Doppler imaging acquisition was performed for evaluation of the kidneys and urinary bladder. FINDINGS: The right kidney measures 11.8 x 4.9 x 5.6 cm in size. The left kidney measures 11.9 x 5.3 x 7 cm in size. The kidneys demonstrate normal echogenicity. There is no hydronephrosis. No perinephric fluid is present. 3.3 cm simple right superior pole renal cyst not requiring further follow-up.. Evaluation of the bladder is limited, but is grossly unremarkable. Bilateral ureteral jets are noted.     Normal ultrasound of the kidneys and urinary tract. Electronically signed by Irvin Merino MD      Bedside Ultrasound  No results found.       Labs  Results for orders placed or performed during the hospital encounter of 03/07/25   COVID-19 & Influenza Combo    Specimen: Nasopharyngeal Swab   Result Value Ref Range     MD  03/09/25 0655

## 2025-03-08 NOTE — PROGRESS NOTES
Patient admitted to room 358 from ED.  Patient oriented to room, call light, bed rails, phone, lights and bathroom.  Patient instructed about the schedule of the day including: vital sign frequency, lab draws, possible tests, frequency of MD and staff rounds, including RN/MD rounding together at bedside, daily weights, and I &O's.  Patient instructed about prescribed diet, how to use 8MENU, and television.  With bed alarm in place, patient aware of placement and reason.  With Telemetry box  in place, patient aware of placement and reason.  Bed locked, in lowest position, side rails up 2/4, call light within reach.  Will continue to monitor.

## 2025-03-08 NOTE — PROGRESS NOTES
03/08/25 0502   NIV Type   $NIV $Daily Charge   NIV Started/Stopped On   Equipment Type V60   Mode CPAP   Mask Type Full face mask   Mask Size Large   Assessment   Respirations 26   SpO2 94 %   Comfort Level Good   Using Accessory Muscles No   Mask Compliance Good   Skin Assessment Clean, dry, & intact   Skin Protection for O2 Device Yes   Orientation Middle   Location Nose   Intervention(s) Skin Barrier   Settings/Measurements   CPAP/EPAP 16 cmH2O   Vt (Measured) 555 mL   FiO2  40 %   Minute Volume (L/min) 13.7 Liters   Mask Leak (lpm) 33 lpm   Patient's Home Machine No   Alarm Settings   Alarms On Y   Low Pressure (cmH2O) 6 cmH2O   High Pressure (cmH2O) 30 cmH2O   Apnea (secs) 20 secs   RR Low (bpm) 6   RR High (bpm) 40 br/min

## 2025-03-08 NOTE — PROGRESS NOTES
[facility] Medicine Progress Note  V 1.6      Date of Admission: 3/7/2025    Hospital Day: 2      Chief Admission Complaint:  SOB    Subjective:  Patient seen at bedside this morning. Patient no acute concerns. Patient no fevers, chills, chest pain, sob, hypoxic events. Patient tolerated CPAP overnight.    Presenting Admission History:       Dinesh Partida is a/an 59 y.o. male with a significant past medical history of hypertension, hyperlipidemia, CAD, PAF, HFrEF, SINDY, and type 2 diabetes who presents to [facility]'s emergency department with complaint of 24 hours of shortness of breath that began sometime yesterday afternoon.  He notes that he has intermittent dyspnea, does not require supplemental O2 at home except during sleep with his CPAP. He was hypoxic on arrival with an SpO2 of 85% on RA, requiring 2LNC in the ED. Denies any particular symptoms aside from dyspnea, no palpitations, no chest pain, no cough no chest congestion, no weight gain, no BLE edema.  He admits this happened about a month ago after having dyspnea for a week he was admitted.  However this time, denies any other symptoms.  The dyspnea is noted mostly with exertion, but sometimes feels it at rest as well.  His evaluation here included laboratory studies, EKG, chest x-ray, and CT PE protocol.  CT of the chest did not show any acute PE, there are persistent but improving infiltrates bilaterally, cardiomegaly and emphysema noted.  Laboratory studies reviewed and pertinent for creatinine 1.8, glucose 1 7, proBNP 650, troponin 20 with repeat of 16, ALT 1 6, AST 57, WBC 7.6.  Flu and COVID testing were negative.  VBG showed pH 7.30, pCO2 46, pO2 34, HCO3 22.  Patient started on vancomycin and cefepime in ED along with a DuoNeb and Solu-Medrol 125mg IVP.  Hospital team consulted to admit.     Assessment/Plan:      Current Principal Problem:  Acute hypoxic respiratory failure (HCC)    Acute Hypoxic Respiratory Failure  COPD  - Admit  Daily    sodium chloride flush  5-40 mL IntraVENous 2 times per day    doxycycline hyclate  100 mg Oral 2 times per day    ipratropium 0.5 mg-albuterol 2.5 mg  1 Dose Inhalation Q4H WA RT    [START ON 3/9/2025] predniSONE  40 mg Oral Daily    insulin lispro  0-16 Units SubCUTAneous 4x Daily AC & HS     PRN Meds: albuterol sulfate HFA, fluticasone, LORazepam, glucose, dextrose bolus **OR** dextrose bolus, glucagon (rDNA), dextrose, sodium chloride flush, sodium chloride, polyethylene glycol, acetaminophen **OR** acetaminophen, prochlorperazine      Physical Exam Performed:      General appearance:  No apparent distress  Respiratory:  Normal respiratory effort.   Cardiovascular:  Regular rate and rhythm.  Abdomen:  Soft, non-tender, non-distended.  Musculoskeletal:  No edema  Neurologic:  Non-focal  Psychiatric:  Alert and oriented    BP (!) 111/51   Pulse 74   Temp 98.1 °F (36.7 °C) (Oral)   Resp 22   Ht 1.778 m (5' 10\")   Wt (!) 137.2 kg (302 lb 8 oz)   SpO2 93%   BMI 43.40 kg/m²     Telemetry:      Personally reviewed and interpreted telemetry (Rhythm Strip) on 3/8/2025 with the following findings: Unremarkable    Diet: ADULT DIET; Regular; 4 carb choices (60 gm/meal); Low Fat/Low Chol/High Fiber/CHANNING; 1500 ml    DVT Prophylaxis: []PPx LMWH  []SQ Heparin  []IPC/SCDs  [x]Eliquis  []Xarelto  []Coumadin  [] Heparin Drip  []Other -      Code status: Full Code    PT/OT Eval Status:   []NOT yet ordered  [x]Ordered and Pending   []Seen with Recommendations for:   []Home independently  []Home w/ assist  []HHC  []SNF  []Acute Rehab    Multi-Disciplinary Rounds with Case Management completed on 3/8/2025 with the following recommendations:  Anticipated Discharge Location: [x]Home w/ [x]HHC vs []SNF  []Acute Rehab  []LTAC  []Hospice  []Other -    Anticipated Discharge Day/Date:  3/10/25  [facility] Dis[facility]  --------------------------------------------------    MDM (any 2 required for High level

## 2025-03-08 NOTE — CONSULTS
Consult Placed     Who: Neli  Date: 3/8/2025  Time: 1218     Electronically signed by Ele Lares RN on 3/8/2025 at 12:19 PM

## 2025-03-08 NOTE — CONSULTS
[address] [address]                              CONSULTATION      PATIENT NAME: NICHOLE ROSA            : 1965  MED REC NO: [id number]                      ROOM: 0358  ACCOUNT NO: [id number]                       ADMIT DATE: 2025  PROVIDER: Juni Quinteros MD      CONSULT DATE: 2025    REASON FOR CONSULTATION:  Chronic kidney disease management.    HISTORY OF PRESENT ILLNESS:  The patient is a 59-year-old  male patient with a past medical history significant for chronic kidney disease and a baseline creatinine ranging between 1.8 and 2 mg/dL.  The patient presented to [facility] complaining of worsening shortness of breath of 24-hour period.  Upon presentation, he was diagnosed with COPD exacerbation and was admitted for further management.  Nephrology was consulted for further management of his chronic kidney disease.    PAST MEDICAL HISTORY:    1. Chronic kidney disease.  2. Coronary artery disease.  3. Diastolic heart failure.  4. Hypertension.  5. Hyperlipidemia.  6. Obstructive sleep apnea.  7. Paroxysmal atrial fibrillation.    PAST SURGICAL HISTORY:  Pacemaker placement.    ALLERGIES:  PATIENT ALLERGIC TO ROSUVASTATIN.      SOCIAL HISTORY:  The patient does not smoke or drink alcohol.    FAMILY HISTORY:  Negative for kidney disease.    REVIEW OF SYSTEMS:  The patient denied any nausea, vomiting, or abdominal pain.  Otherwise, a 10-point review of systems was relatively unremarkable.    PHYSICAL EXAMINATION:  VITAL SIGNS:  Blood pressure 111/51, heart rate 74, respirations 22, temperature 98.1 Fahrenheit.  The patient is saturating 90% on 2 L nasal cannula.  GENERAL APPEARANCE:  The patient is alert, oriented x3, not in acute distress.  EYES:  Reveal normal conjunctivae, reactive pupils.    NECK:  Reveals midline trachea.  Nonpalpable thyroid.  LUNGS:  Clear to anterior

## 2025-03-09 LAB
ALBUMIN SERPL-MCNC: 3.9 G/DL (ref 3.4–5)
ALP SERPL-CCNC: 84 U/L (ref 40–129)
ALT SERPL-CCNC: 137 U/L (ref 10–40)
ANION GAP SERPL CALCULATED.3IONS-SCNC: 14 MMOL/L (ref 3–16)
AST SERPL-CCNC: 59 U/L (ref 15–37)
BASOPHILS # BLD: 0 K/UL (ref 0–0.2)
BASOPHILS NFR BLD: 0.1 %
BILIRUB DIRECT SERPL-MCNC: 0.2 MG/DL (ref 0–0.3)
BILIRUB INDIRECT SERPL-MCNC: 0.1 MG/DL (ref 0–1)
BILIRUB SERPL-MCNC: 0.3 MG/DL (ref 0–1)
BUN SERPL-MCNC: 39 MG/DL (ref 7–20)
CALCIUM SERPL-MCNC: 9.8 MG/DL (ref 8.3–10.6)
CHLORIDE SERPL-SCNC: 102 MMOL/L (ref 99–110)
CO2 SERPL-SCNC: 23 MMOL/L (ref 21–32)
CREAT SERPL-MCNC: 1.8 MG/DL (ref 0.8–1.3)
DEPRECATED RDW RBC AUTO: 17.5 % (ref 12.4–15.4)
EOSINOPHIL # BLD: 0 K/UL (ref 0–0.6)
EOSINOPHIL NFR BLD: 0.1 %
GFR SERPLBLD CREATININE-BSD FMLA CKD-EPI: 42 ML/MIN/{1.73_M2}
GLUCOSE BLD-MCNC: 190 MG/DL (ref 70–99)
GLUCOSE BLD-MCNC: 192 MG/DL (ref 70–99)
GLUCOSE BLD-MCNC: 211 MG/DL (ref 70–99)
GLUCOSE BLD-MCNC: 243 MG/DL (ref 70–99)
GLUCOSE SERPL-MCNC: 201 MG/DL (ref 70–99)
HCT VFR BLD AUTO: 50.8 % (ref 40.5–52.5)
HGB BLD-MCNC: 16.7 G/DL (ref 13.5–17.5)
LYMPHOCYTES # BLD: 1.3 K/UL (ref 1–5.1)
LYMPHOCYTES NFR BLD: 8.5 %
MAGNESIUM SERPL-MCNC: 2.64 MG/DL (ref 1.8–2.4)
MCH RBC QN AUTO: 29.1 PG (ref 26–34)
MCHC RBC AUTO-ENTMCNC: 32.8 G/DL (ref 31–36)
MCV RBC AUTO: 88.6 FL (ref 80–100)
MONOCYTES # BLD: 1.3 K/UL (ref 0–1.3)
MONOCYTES NFR BLD: 8.6 %
NEUTROPHILS # BLD: 12.3 K/UL (ref 1.7–7.7)
NEUTROPHILS NFR BLD: 82.7 %
PERFORMED ON: ABNORMAL
PHOSPHATE SERPL-MCNC: 4.6 MG/DL (ref 2.5–4.9)
PLATELET # BLD AUTO: 225 K/UL (ref 135–450)
PMV BLD AUTO: 8.1 FL (ref 5–10.5)
POTASSIUM SERPL-SCNC: 4.5 MMOL/L (ref 3.5–5.1)
PROT SERPL-MCNC: 7.2 G/DL (ref 6.4–8.2)
RBC # BLD AUTO: 5.74 M/UL (ref 4.2–5.9)
SODIUM SERPL-SCNC: 139 MMOL/L (ref 136–145)
TROPONIN, HIGH SENSITIVITY: 15 NG/L (ref 0–22)
WBC # BLD AUTO: 14.9 K/UL (ref 4–11)

## 2025-03-09 PROCEDURE — 80076 HEPATIC FUNCTION PANEL: CPT

## 2025-03-09 PROCEDURE — 6370000000 HC RX 637 (ALT 250 FOR IP): Performed by: INTERNAL MEDICINE

## 2025-03-09 PROCEDURE — 84100 ASSAY OF PHOSPHORUS: CPT

## 2025-03-09 PROCEDURE — 94761 N-INVAS EAR/PLS OXIMETRY MLT: CPT

## 2025-03-09 PROCEDURE — 97165 OT EVAL LOW COMPLEX 30 MIN: CPT

## 2025-03-09 PROCEDURE — 6360000002 HC RX W HCPCS: Performed by: INTERNAL MEDICINE

## 2025-03-09 PROCEDURE — 2700000000 HC OXYGEN THERAPY PER DAY

## 2025-03-09 PROCEDURE — 84484 ASSAY OF TROPONIN QUANT: CPT

## 2025-03-09 PROCEDURE — 83735 ASSAY OF MAGNESIUM: CPT

## 2025-03-09 PROCEDURE — 97535 SELF CARE MNGMENT TRAINING: CPT

## 2025-03-09 PROCEDURE — 85025 COMPLETE CBC W/AUTO DIFF WBC: CPT

## 2025-03-09 PROCEDURE — 6370000000 HC RX 637 (ALT 250 FOR IP)

## 2025-03-09 PROCEDURE — 94660 CPAP INITIATION&MGMT: CPT

## 2025-03-09 PROCEDURE — 94640 AIRWAY INHALATION TREATMENT: CPT

## 2025-03-09 PROCEDURE — 80048 BASIC METABOLIC PNL TOTAL CA: CPT

## 2025-03-09 PROCEDURE — 93005 ELECTROCARDIOGRAM TRACING: CPT

## 2025-03-09 PROCEDURE — 36415 COLL VENOUS BLD VENIPUNCTURE: CPT

## 2025-03-09 PROCEDURE — 2500000003 HC RX 250 WO HCPCS: Performed by: NURSE PRACTITIONER

## 2025-03-09 PROCEDURE — 6370000000 HC RX 637 (ALT 250 FOR IP): Performed by: NURSE PRACTITIONER

## 2025-03-09 PROCEDURE — 97530 THERAPEUTIC ACTIVITIES: CPT

## 2025-03-09 PROCEDURE — 99233 SBSQ HOSP IP/OBS HIGH 50: CPT | Performed by: INTERNAL MEDICINE

## 2025-03-09 PROCEDURE — 97161 PT EVAL LOW COMPLEX 20 MIN: CPT

## 2025-03-09 PROCEDURE — 1200000000 HC SEMI PRIVATE

## 2025-03-09 RX ORDER — TRANEXAMIC ACID 100 MG/ML
500 INJECTION, SOLUTION INTRAVENOUS EVERY 8 HOURS
Status: DISPENSED | OUTPATIENT
Start: 2025-03-09 | End: 2025-03-10

## 2025-03-09 RX ORDER — FUROSEMIDE 20 MG/1
20 TABLET ORAL DAILY
Status: DISCONTINUED | OUTPATIENT
Start: 2025-03-09 | End: 2025-03-14 | Stop reason: HOSPADM

## 2025-03-09 RX ADMIN — AMIODARONE HYDROCHLORIDE 400 MG: 200 TABLET ORAL at 21:42

## 2025-03-09 RX ADMIN — DOXYCYCLINE HYCLATE 100 MG: 100 TABLET, FILM COATED ORAL at 21:43

## 2025-03-09 RX ADMIN — LORAZEPAM 1 MG: 1 TABLET ORAL at 08:00

## 2025-03-09 RX ADMIN — METOPROLOL SUCCINATE 50 MG: 50 TABLET, FILM COATED, EXTENDED RELEASE ORAL at 08:00

## 2025-03-09 RX ADMIN — INSULIN LISPRO 4 UNITS: 100 INJECTION, SOLUTION INTRAVENOUS; SUBCUTANEOUS at 21:51

## 2025-03-09 RX ADMIN — FUROSEMIDE 20 MG: 20 TABLET ORAL at 15:01

## 2025-03-09 RX ADMIN — BUDESONIDE 500 MCG: 0.5 SUSPENSION RESPIRATORY (INHALATION) at 11:03

## 2025-03-09 RX ADMIN — SODIUM CHLORIDE, PRESERVATIVE FREE 10 ML: 5 INJECTION INTRAVENOUS at 21:43

## 2025-03-09 RX ADMIN — IPRATROPIUM BROMIDE 2 SPRAY: 21 SPRAY NASAL at 21:44

## 2025-03-09 RX ADMIN — MONTELUKAST 10 MG: 10 TABLET, FILM COATED ORAL at 21:42

## 2025-03-09 RX ADMIN — BUDESONIDE 500 MCG: 0.5 SUSPENSION RESPIRATORY (INHALATION) at 19:46

## 2025-03-09 RX ADMIN — INSULIN LISPRO 4 UNITS: 100 INJECTION, SOLUTION INTRAVENOUS; SUBCUTANEOUS at 08:00

## 2025-03-09 RX ADMIN — PAROXETINE HYDROCHLORIDE 20 MG: 20 TABLET, FILM COATED ORAL at 08:00

## 2025-03-09 RX ADMIN — DOXYCYCLINE HYCLATE 100 MG: 100 TABLET, FILM COATED ORAL at 08:00

## 2025-03-09 RX ADMIN — PREDNISONE 40 MG: 20 TABLET ORAL at 08:00

## 2025-03-09 RX ADMIN — AMIODARONE HYDROCHLORIDE 400 MG: 200 TABLET ORAL at 08:00

## 2025-03-09 ASSESSMENT — PAIN SCALES - GENERAL
PAINLEVEL_OUTOF10: 8
PAINLEVEL_OUTOF10: 3
PAINLEVEL_OUTOF10: 6

## 2025-03-09 ASSESSMENT — PAIN DESCRIPTION - LOCATION
LOCATION: CHEST
LOCATION: CHEST
LOCATION: BACK

## 2025-03-09 NOTE — PLAN OF CARE
CHF Care Plan      Patient's EF (Ejection Fraction) is pending    Heart Failure Medications:  Diuretics:: Furosemide and Torsemide     (One of the following REQUIRED for EF </= 40%/SYSTOLIC FAILURE but MAY be used in EF% >40%/DIASTOLIC FAILURE)        ACE:: None        ARB:: None         ARNI:: Sacubitril/Valsartan-Entresto (Held)    (Beta Blockers)  NON- Evidenced Based Beta Blocker (for EF% >40%/DIASTOLIC FAILURE): None    Evidenced Based Beta Blocker::(REQUIRED for EF% <40%/SYSTOLIC FAILURE) Metoprolol SUCCinate- Toprol XL  ...................................................................................................................................................    Failed to redirect to the Timeline version of the [employer] SmartLink.      Patient's weights and intake/output reviewed    Daily Weight log at bedside, patient/family participation in use of log: \"yes    Patient's current weight today shows initial weight.      Intake/Output Summary (Last 24 hours) at 3/9/2025 1707  Last data filed at 3/9/2025 1503  Gross per 24 hour   Intake 1560 ml   Output 3025 ml   Net -1465 ml       Education Booklet Provided: yes    Comorbidities Reviewed Yes    Patient has a past medical history of Asthma, CAD (coronary artery disease), CHF (congestive heart failure) (HCC), Hyperlipidemia, Hypertension, SINDY (obstructive sleep apnea), PAF (paroxysmal atrial fibrillation) (HCC), and Type 2 diabetes mellitus (HCC).     >>For CHF and Comorbidity documentation on Education Time and Topics, please see Education Tab      CHF Education    Learners:  Patient  Readineess:   Acceptance  Method:   Explanation and Handout  Response:   Verbalizes Understanding  Comments: I/O, Urinal, Weight    Time Spent: 10 mins      Pt resting in bed at this time on PAP Pt denies shortness of breath. Pt with pitting lower extremity edema.     Patient and/or Family's stated Goal of Care this Admission: reduce shortness of breath and better understand  heart failure and disease management prior to discharge        :

## 2025-03-09 NOTE — CONSULTS
Consult Placed     Who: [facility]  Date: 3/9/2025  Time: 1324     Electronically signed by Ele Lares RN on 3/9/2025 at 1:24 PM      3/9/2025 1454 Per Jay Jay, Cards will see patient in AM.

## 2025-03-09 NOTE — PROGRESS NOTES
PULMONARY AND CRITICAL CARE INPATIENT NOTE        Dinesh Partida   : 1965  MRN: [id number]     Admitting Physician: Leighton Abdul MD  Attending Physician: Sergio Macias MD  PCP: Zee Patel MD    Date of Service: 3/9/2025  Admission date:3/7/2025   LOS: Hospital Day: 3   Code:Full Code      ASSESSMENT & PLAN       59 y.o. male patient was admitted on 3/7/2025 with  Chief Complaint   Patient presents with    Shortness of Breath     PNA 1 month ago. C/o SOB w/ pink tinged blood w/ cough. 88% on RA.  Hx COPD, afib, and defibrillator. T 100 axillary per EMT. . EKG SR at 78.        Recurrent mild hemoptysis  Recent pneumonia.  Improving follow-up imaging  Persistent sinus congestion with postnasal drip  A-fib on anticoagulation  Severe SINDY on CPAP with pressure of 16  COPD/emphysema    Obesity class III      Plan:              Keep holding apixaban  Respiratory molecular panel  Ipratropium nasal spray  Continue TXA nebulizers due to persistent hemoptysis  Continue antibiotics pending micro results  Continue steroids  Pulmicort nebulizer  No plan for bronchoscopy currently but will consider if patient continues to worsen despite current measures  Will use hospital V60 to replace home CPAP while inpatient  Pulmonary toilet  Aspiration precautions      Subjective/Objective     Interval History: Encounter 3/9/2025  Patient made afebrile and hemodynamically stable on 2 L alternating with BiPAP at bedtime and naps during the day  I's and O's -1.5 L  He remained on amiodarone, Pulmicort nebulizer, doxycycline, DuoNebs, Singulair, prednisone, TXA nebs and apixaban on hold  No new positive micro results  No new labs this morning yet        CC/Reason for Consult: h/o CHF, CAD, here with acute hypoxia       HPI: 2025  59-year-old male patient with PMH of SINDY on CPAP, COPD/emphysema with recent admission in early February with CHF/AECOPD/mild hemoptysis while on Eliquis for A-fib who    *   BILITOT 0.4   ALKPHOS 100     PT/INR: No results for input(s): \"PROTIME\", \"INR\" in the last 72 hours.       Vitals:    03/09/25 0445   BP: 136/81   Pulse: 70   Resp: 16   Temp: 97.7 °F (36.5 °C)   SpO2: 99%       Physical Exam:  General appearance: In no apparent distress.  HEENT: Anicteric.  Cardiac: No loud murmur.  Lungs: Decreased air entry over the lung bases.  Abdomen: Soft.  Back & Extremities: No clubbing.  No signs of acute ischemia.  Neurological: No focal deficit.      ________________________________________________________  Electronically signed by:  Smita Levi MD,FACP    3/9/2025    6:28 AM.     [facility]  [address], [place] [address]   Phone (office): [phone]

## 2025-03-09 NOTE — PLAN OF CARE
CHF Care Plan      Patient's EF (Ejection Fraction): Echo ordered    Heart Failure Medications:  Diuretics:: Furosemide (Aldactone Held)    (One of the following REQUIRED for EF </= 40%/SYSTOLIC FAILURE but MAY be used in EF% >40%/DIASTOLIC FAILURE)        ACE:: None        ARB:: None         ARNI:: Sacubitril/Valsartan-Entresto    (Beta Blockers)  NON- Evidenced Based Beta Blocker (for EF% >40%/DIASTOLIC FAILURE): None    Evidenced Based Beta Blocker::(REQUIRED for EF% <40%/SYSTOLIC FAILURE) Metoprolol SUCCinate- Toprol XL  ...................................................................................................................................................    Failed to redirect to the Timeline version of the [employer] SmartLink.      Patient's weights and intake/output reviewed    Daily Weight log at bedside, patient/family participation in use of log: \"yes-Updated    Patient's current weight today shows a difference of 1 lbs more than last documented weight.      Intake/Output Summary (Last 24 hours) at 3/9/2025 1703  Last data filed at 3/9/2025 1503  Gross per 24 hour   Intake 1920 ml   Output 3875 ml   Net -1955 ml       Education Booklet Provided: yes    Comorbidities Reviewed Yes    Patient has a past medical history of Asthma, CAD (coronary artery disease), CHF (congestive heart failure) (HCC), Hyperlipidemia, Hypertension, SINDY (obstructive sleep apnea), PAF (paroxysmal atrial fibrillation) (HCC), and Type 2 diabetes mellitus (HCC).     >>For CHF and Comorbidity documentation on Education Time and Topics, please see Education Tab      CHF Education    Learners:  Patient  Readineess:   Acceptance  Method:   Explanation and Handout  Response:   Verbalizes Understanding and Needs Reinforcement  Comments: I/O, Red Yellow Green Zones, Meds, Weight    Time Spent: 25 mins      Pt up in chair at this time on  2 L O2. Pt denies shortness of breath. Pt with pitting lower extremity edema.     Patient and/or

## 2025-03-09 NOTE — PROGRESS NOTES
Occupational Therapy  Facility/Department: [place] [id number] - MED SURG  Occupational Therapy Initial Assessment/Discharge    Name: Dinesh Partida  : 1965  MRN: [id number]  Date of Service: 3/9/2025    Discharge Recommendations:  Home with assist PRN  OT Equipment Recommendations  Equipment Needed: No       Patient Diagnosis(es): The primary encounter diagnosis was Hypoxia. Diagnoses of Acute on chronic congestive heart failure, unspecified heart failure type (HCC) and Congestive heart failure, unspecified HF chronicity, unspecified heart failure type (HCC) were also pertinent to this visit.  Past Medical History:  has a past medical history of Asthma, CAD (coronary artery disease), CHF (congestive heart failure) (HCC), Hyperlipidemia, Hypertension, SINDY (obstructive sleep apnea), PAF (paroxysmal atrial fibrillation) (HCC), and Type 2 diabetes mellitus (HCC).  Past Surgical History:  has a past surgical history that includes Pacemaker insertion (Left) and Cardiac electrophysiology study and ablation.           Assessment  Assessment: Patient tolerated initial evaluation/treatment well. Patient presented to [facility] with acute hypoxic respiratory failure. Typically patient completes ADL tasks with minimal assistance and IADLs with minimal assistance from spouse. Today pt required moderate assistance to don socks and supervision for ambulation. Patient VSS and appears to be functioning at his baseline. No acute OT needs at this time and patient is safe to discharge from OT services. Please re refer if new needs arise.     Prognosis: Good  Decision Making: Low Complexity  No Skilled OT: At baseline function;Safe to return home  REQUIRES OT FOLLOW-UP: No  Activity Tolerance  Activity Tolerance: Patient Tolerated treatment well     Plan  Occupational Therapy Plan  Times Per Week: DC from OT    Restrictions  Restrictions/Precautions  Restrictions/Precautions: General Precautions  Position Activity Restriction  Other  Independent  Bed to Chair: Supervision  Gait  Gait Training: Yes  Overall Level of Assistance: Supervision  Distance (ft): 45 Feet  Assistive Device: Gait belt  Speed/Tiana: Slow  Step Length: Left shortened;Right shortened  Gait Abnormalities: Altered arm swing;Decreased step clearance     AROM: Within functional limits  PROM: Within functional limits  Strength: Within functional limits  Coordination: Within functional limits  Tone: Normal  Sensation: Intact  ADL  Grooming: Setup  Putting On/Taking Off Footwear: Moderate assistance  Putting On/Taking Off Footwear Skilled Clinical Factors: OT initiated socks over pts toes and pt pulled socks up - this is pts baseline; educated on AE and positioning options  Functional Mobility: Supervision  Functional Mobility Skilled Clinical Factors: to ambulate in room with therapist managing IV pole; mild SOB noted however SpO2 >94%     Activity Tolerance  Activity Tolerance: Patient tolerated evaluation without incident        Vision  Vision: Impaired  Vision Exceptions: Wears glasses for reading  Hearing  Hearing: Within functional limits  Cognition  Overall Cognitive Status: WNL  Orientation  Overall Orientation Status: Within Normal Limits  Orientation Level: Oriented X4                  Education Given To: Patient  Education Provided: Role of Therapy;Transfer Training;Plan of Care;Equipment;Precautions;Orientation;Mobility Training  Education Provided Comments: Pt educated on importance of OOB mobility, prevention of complications of bedrest, and general safety during hospitalization  Education Method: Demonstration;Verbal  Barriers to Learning: None  Education Outcome: Verbalized understanding;Demonstrated understanding       AM-PAC - ADL  AM-PAC Daily Activity - Inpatient   How much help is needed for putting on and taking off regular lower body clothing?: A Lot  How much help is needed for bathing (which includes washing, rinsing, drying)?: None  How much help is

## 2025-03-09 NOTE — PROGRESS NOTES
[facility] Medicine Progress Note  V 1.6      Date of Admission: 3/7/2025    Hospital Day: 3      Chief Admission Complaint:  SOB    Subjective:  Patient seen at bedside this morning. Patient no acute concerns. Patient no fevers, chills, chest pain, sob, hypoxic events. Patient tolerated CPAP overnight.    Presenting Admission History:       Dinesh Partida is a/an 59 y.o. male with a significant past medical history of hypertension, hyperlipidemia, CAD, PAF, HFrEF, SINDY, and type 2 diabetes who presents to [facility]'s emergency department with complaint of 24 hours of shortness of breath that began sometime yesterday afternoon.  He notes that he has intermittent dyspnea, does not require supplemental O2 at home except during sleep with his CPAP. He was hypoxic on arrival with an SpO2 of 85% on RA, requiring 2LNC in the ED. Denies any particular symptoms aside from dyspnea, no palpitations, no chest pain, no cough no chest congestion, no weight gain, no BLE edema.  He admits this happened about a month ago after having dyspnea for a week he was admitted.  However this time, denies any other symptoms.  The dyspnea is noted mostly with exertion, but sometimes feels it at rest as well.  His evaluation here included laboratory studies, EKG, chest x-ray, and CT PE protocol.  CT of the chest did not show any acute PE, there are persistent but improving infiltrates bilaterally, cardiomegaly and emphysema noted.  Laboratory studies reviewed and pertinent for creatinine 1.8, glucose 1 7, proBNP 650, troponin 20 with repeat of 16, ALT 1 6, AST 57, WBC 7.6.  Flu and COVID testing were negative.  VBG showed pH 7.30, pCO2 46, pO2 34, HCO3 22.  Patient started on vancomycin and cefepime in ED along with a DuoNeb and Solu-Medrol 125mg IVP.  Hospital team consulted to admit.     Assessment/Plan:      Current Principal Problem:  Acute hypoxic respiratory failure (HCC)    Acute Hypoxic Respiratory Failure  COPD  - Admit

## 2025-03-09 NOTE — PROGRESS NOTES
Physical Therapy  Facility/Department: [place] [address] - MED SURG  Physical Therapy Initial Assessment & Discharge Summary    Name: Dinehs Partida  : 1965  MRN: [id number]  Date of Service: 3/9/2025    Discharge Recommendations:  Home with assist PRN, No therapy recommended at discharge   PT Equipment Recommendations  Equipment Needed: No      Patient Diagnosis(es): The primary encounter diagnosis was Hypoxia. Diagnoses of Acute on chronic congestive heart failure, unspecified heart failure type (HCC) and Congestive heart failure, unspecified HF chronicity, unspecified heart failure type (HCC) were also pertinent to this visit.  Past Medical History:  has a past medical history of Asthma, CAD (coronary artery disease), CHF (congestive heart failure) (HCC), Hyperlipidemia, Hypertension, SINDY (obstructive sleep apnea), PAF (paroxysmal atrial fibrillation) (HCC), and Type 2 diabetes mellitus (HCC).  Past Surgical History:  has a past surgical history that includes Pacemaker insertion (Left) and Cardiac electrophysiology study and ablation.    Assessment  Assessment: Pt is awake and agreeable to therapy session. She is admitted to [facility] w/ tianna. He is on 2L O2 throughout therapy and is typically on room air, but vitals remain stable throughout evaluation. He requires supervision for lines to ambulate in room without AD and is independent for transfers and bed mobility. Due to no acute needs, he is discharged from acute PT at this time.  Therapy Prognosis: Excellent  Decision Making: Low Complexity  Requires PT Follow-Up: Yes  Activity Tolerance  Activity Tolerance: Patient tolerated evaluation without incident       Plan  Physical Therapy Plan  General Plan: Discharge with evaluation only  Safety Devices  Type of Devices: All fall risk precautions in place, Patient at risk for falls, Chair alarm in place, Left in chair, Call light within reach, Nurse notified  Restraints  Restraints Initially in Place:  Training: Yes  Sit to Stand: Independent  Stand to Sit: Independent  Bed to Chair: Supervision  Gait  Gait Training: Yes  Overall Level of Assistance: Supervision  Distance (ft): 45 Feet  Assistive Device: Gait belt  Speed/Tiana: Slow  Step Length: Left shortened;Right shortened  Gait Abnormalities: Altered arm swing;Decreased step clearance                         OutComes Score                                                  AM-PAC - Mobility    AM-PAC Basic Mobility - Inpatient   How much help is needed turning from your back to your side while in a flat bed without using bedrails?: None  How much help is needed moving from lying on your back to sitting on the side of a flat bed without using bedrails?: None  How much help is needed moving to and from a bed to a chair?: None  How much help is needed standing up from a chair using your arms?: None  How much help is needed walking in hospital room?: A Little  How much help is needed climbing 3-5 steps with a railing?: A Little  [facility] : 22  AM-PAC Inpatient T-Scale Score : 53.28  Mobility Inpatient CMS 0-100% Score: 20.91  Mobility Inpatient CMS G-Code Modifier : CJ         Tinneti Score       Goals   No acute goals identified       Education  Patient Education  Education Given To: Patient  Education Provided: Role of Therapy;Plan of Care;Transfer Training  Education Provided Comments: Disease Specific Education: Pt educated on importance of OOB mobility, prevention of complications of bedrest, and general safety during hospitalization.  Education Method: Verbal  Barriers to Learning: None  Education Outcome: Verbalized understanding      Therapy Time   Individual Concurrent Group Co-treatment   Time In 1029         Time Out 1050         Minutes 21         Timed Code Treatment Minutes: 11 Minutes + 10 minute ALICE Mckeon, PT

## 2025-03-09 NOTE — PLAN OF CARE
Problem: Chronic Conditions and Co-morbidities  Goal: Patient's chronic conditions and co-morbidity symptoms are monitored and maintained or improved  Outcome: Progressing  Flowsheets  Taken 3/8/2025 2045 by Veronica Kirk RN  Care Plan - Patient's Chronic Conditions and Co-Morbidity Symptoms are Monitored and Maintained or Improved: Monitor and assess patient's chronic conditions and comorbid symptoms for stability, deterioration, or improvement     Problem: Discharge Planning  Goal: Discharge to home or other facility with appropriate resources  Outcome: Progressing  Flowsheets  Taken 3/8/2025 2045 by Veronica Kirk RN  Discharge to home or other facility with appropriate resources: Identify barriers to discharge with patient and caregiver     Problem: Pain  Goal: Verbalizes/displays adequate comfort level or baseline comfort level  Outcome: Progressing       Problem: Safety - Adult  Goal: Free from fall injury  Outcome: Progressing     Problem: ABCDS Injury Assessment  Goal: Absence of physical injury  Outcome: Progressing     Problem: Respiratory - Adult  Goal: Achieves optimal ventilation and oxygenation  Outcome: Progressing     Problem: Cardiovascular - Adult  Goal: Maintains optimal cardiac output and hemodynamic stability  Outcome: Progressing  Goal: Absence of cardiac dysrhythmias or at baseline  Outcome: Progressing     Problem: Skin/Tissue Integrity - Adult  Goal: Skin integrity remains intact  Outcome: Progressing  Flowsheets  Taken 3/8/2025 2145 by Veronica Kirk RN  Skin Integrity Remains Intact: Monitor for areas of redness and/or skin breakdown  Taken 3/8/2025 2045 by Veronica Kirk RN  Skin Integrity Remains Intact: Monitor for areas of redness and/or skin breakdown    Goal: Incisions, wounds, or drain sites healing without S/S of infection  Outcome: Progressing  Goal: Oral mucous membranes remain intact  Outcome: Progressing     Problem: Musculoskeletal - Adult  Goal: Return mobility to  safest level of function  Outcome: Progressing  Goal: Maintain proper alignment of affected body part  Outcome: Progressing  Goal: Return ADL status to a safe level of function  Outcome: Progressing     Problem: Gastrointestinal - Adult  Goal: Minimal or absence of nausea and vomiting  Outcome: Progressing  Goal: Maintains or returns to baseline bowel function  Outcome: Progressing  Goal: Maintains adequate nutritional intake  Outcome: Progressing  Goal: Establish and maintain optimal ostomy function  Outcome: Progressing     Problem: Metabolic/Fluid and Electrolytes - Adult  Goal: Electrolytes maintained within normal limits  Outcome: Progressing  Goal: Hemodynamic stability and optimal renal function maintained  Outcome: Progressing  Goal: Glucose maintained within prescribed range  Outcome: Progressing     Problem: Anxiety  Goal: Will report anxiety at manageable levels  Description: INTERVENTIONS:  1. Administer medication as ordered  2. Teach and rehearse alternative coping skills  3. Provide emotional support with 1:1 interaction with staff  Outcome: Progressing

## 2025-03-09 NOTE — PROGRESS NOTES
03/09/25 0338   NIV Type   Equipment Type v60   Mode CPAP   Mask Type Full face mask   Mask Size Large   Assessment   Respirations 16   SpO2 97 %   Comfort Level Good   Using Accessory Muscles No   Mask Compliance Good   Skin Protection for O2 Device Yes   Orientation Middle   Location Nose   Intervention(s) Skin Barrier   Settings/Measurements   PIP Observed 16 cm H20   CPAP/EPAP 16 cmH2O   Vt (Measured) 654 mL   FiO2  40 %   Minute Volume (L/min) 10.7 Liters   Mask Leak (lpm) 28 lpm   Patient's Home Machine No   Alarm Settings   Alarms On Y   Low Pressure (cmH2O) 6 cmH2O   High Pressure (cmH2O) 30 cmH2O   Apnea (secs) 20 secs   RR Low (bpm) 6   RR High (bpm) 40 br/min

## 2025-03-09 NOTE — PLAN OF CARE
activity tolerance, better understand heart failure and disease management, be more comfortable, and reduce lower extremity edema prior to discharge        :

## 2025-03-09 NOTE — PROGRESS NOTES
Department of Internal Medicine  Nephrology Progress Note        SUBJECTIVE:    We are following this patient for CKD management.  The patient was seen and examined; he feels well today with no CP, SOB, nausea or vomiting.    ROS: No fever or chills.  Social: No family at bedside.    Physical Exam:    VITALS:  /68   Pulse 70   Temp 98.8 °F (37.1 °C) (Oral)   Resp 20   Ht 1.778 m (5' 10\")   Wt (!) 137.5 kg (303 lb 3.2 oz)   SpO2 95%   BMI 43.50 kg/m²     General appearance: Seems comfortable, no acute distress.  Neck: Trachea midline, thyroid normal.   Lungs:  Non labored breathing, CTA to anterior auscultation.  Heart:  S1S2 normal, rub or gallop. No peripheral edema.  Abdomen: Soft, non-tender, no organomegaly.   Skin: No lesions or rashes, warm to touch.     DATA:    CBC with Differential:    Lab Results   Component Value Date/Time    WBC 14.9 03/09/2025 06:49 AM    RBC 5.74 03/09/2025 06:49 AM    HGB 16.7 03/09/2025 06:49 AM    HCT 50.8 03/09/2025 06:49 AM     03/09/2025 06:49 AM    MCV 88.6 03/09/2025 06:49 AM    MCH 29.1 03/09/2025 06:49 AM    MCHC 32.8 03/09/2025 06:49 AM    RDW 17.5 03/09/2025 06:49 AM    LYMPHOPCT 8.5 03/09/2025 06:49 AM    MONOPCT 8.6 03/09/2025 06:49 AM    MYELOPCT 2 02/02/2025 09:53 PM    EOSPCT 0.1 03/09/2025 06:49 AM    BASOPCT 0.1 03/09/2025 06:49 AM    MONOSABS 1.3 03/09/2025 06:49 AM    LYMPHSABS 1.3 03/09/2025 06:49 AM    EOSABS 0.0 03/09/2025 06:49 AM    BASOSABS 0.0 03/09/2025 06:49 AM     BMP:    Lab Results   Component Value Date/Time     03/09/2025 06:49 AM    K 4.5 03/09/2025 06:49 AM    K 4.5 03/07/2025 09:41 PM     03/09/2025 06:49 AM    CO2 23 03/09/2025 06:49 AM    BUN 39 03/09/2025 06:49 AM    CREATININE 1.8 03/09/2025 06:49 AM    CALCIUM 9.8 03/09/2025 06:49 AM    LABGLOM 42 03/09/2025 06:49 AM    GLUCOSE 201 03/09/2025 06:49 AM       IMPRESSION/RECOMMENDATIONS:      1- CKD: The patient has chronic kidney disease with a baseline creatinine  of 1.8 to 2 mg/dl. His urinary output is well maintained and his serum creatinine is currently at baseline, monitor.     2- No significant electrolytes disorders noted.     3- HTN: Blood pressure within acceptable range.     4- COPD exacerbation: Management per primary team.

## 2025-03-09 NOTE — PROGRESS NOTES
03/09/25 0004   NIV Type   $NIV $Daily Charge   Equipment Type v60   Mode CPAP   Mask Type Full face mask   Mask Size Large   Assessment   Respirations 16   SpO2 96 %   Comfort Level Good   Using Accessory Muscles No   Mask Compliance Good   Skin Protection for O2 Device Yes   Orientation Middle   Location Nose   Intervention(s) Skin Barrier   Settings/Measurements   PIP Observed 18 cm H20   CPAP/EPAP 16 cmH2O   Vt (Measured) 840 mL   FiO2  40 %   Minute Volume (L/min) 13 Liters   Mask Leak (lpm) 23 lpm   Patient's Home Machine No   Alarm Settings   Alarms On Y   Low Pressure (cmH2O) 6 cmH2O   High Pressure (cmH2O) 30 cmH2O   Apnea (secs) 20 secs   RR Low (bpm) 6   RR High (bpm) 40 br/min

## 2025-03-09 NOTE — PROGRESS NOTES
03/08/25 2116   NIV Type   NIV Started/Stopped On   Equipment Type v60   Mode CPAP   Mask Type Full face mask   Mask Size Large   Assessment   Respirations 14   SpO2 97 %   Comfort Level Good   Using Accessory Muscles No   Mask Compliance Good   Skin Assessment Clean, dry, & intact   Skin Protection for O2 Device Yes   Orientation Middle   Location Nose   Intervention(s) Skin Barrier   Settings/Measurements   PIP Observed 17 cm H20   CPAP/EPAP 16 cmH2O   Vt (Measured) 1089 mL   FiO2  40 %   Minute Volume (L/min) 14.4 Liters   Mask Leak (lpm) 27 lpm   Patient's Home Machine No   Alarm Settings   Alarms On Y   Low Pressure (cmH2O) 6 cmH2O   High Pressure (cmH2O) 30 cmH2O   Apnea (secs) 20 secs   RR Low (bpm) 6   RR High (bpm) 40 br/min

## 2025-03-10 ENCOUNTER — APPOINTMENT (OUTPATIENT)
Age: 60
DRG: 189 | End: 2025-03-10
Payer: MEDICARE

## 2025-03-10 PROBLEM — R09.02 HYPOXIA: Status: ACTIVE | Noted: 2025-03-10

## 2025-03-10 LAB
ANION GAP SERPL CALCULATED.3IONS-SCNC: 12 MMOL/L (ref 3–16)
BASOPHILS # BLD: 0 K/UL (ref 0–0.2)
BASOPHILS NFR BLD: 0.2 %
BUN SERPL-MCNC: 44 MG/DL (ref 7–20)
CALCIUM SERPL-MCNC: 9.6 MG/DL (ref 8.3–10.6)
CHLORIDE SERPL-SCNC: 103 MMOL/L (ref 99–110)
CO2 SERPL-SCNC: 23 MMOL/L (ref 21–32)
CREAT SERPL-MCNC: 1.5 MG/DL (ref 0.8–1.3)
DEPRECATED RDW RBC AUTO: 17.7 % (ref 12.4–15.4)
ECHO AO ASC DIAM: 3.4 CM
ECHO AO ASCENDING AORTA INDEX: 1.37 CM/M2
ECHO AO ROOT DIAM: 3.5 CM
ECHO AO ROOT INDEX: 1.41 CM/M2
ECHO AV AREA PEAK VELOCITY: 3.6 CM2
ECHO AV AREA VTI: 3.4 CM2
ECHO AV AREA/BSA PEAK VELOCITY: 1.4 CM2/M2
ECHO AV AREA/BSA VTI: 1.4 CM2/M2
ECHO AV MEAN GRADIENT: 4 MMHG
ECHO AV MEAN VELOCITY: 0.9 M/S
ECHO AV PEAK GRADIENT: 8 MMHG
ECHO AV PEAK VELOCITY: 1.4 M/S
ECHO AV VELOCITY RATIO: 0.79
ECHO AV VTI: 26.7 CM
ECHO BSA: 2.57 M2
ECHO EST RA PRESSURE: 8 MMHG
ECHO LA AREA 2C: 25.7 CM2
ECHO LA AREA 4C: 22 CM2
ECHO LA DIAMETER INDEX: 2.57 CM/M2
ECHO LA DIAMETER: 6.4 CM
ECHO LA MAJOR AXIS: 6.5 CM
ECHO LA MINOR AXIS: 6.4 CM
ECHO LA TO AORTIC ROOT RATIO: 1.83
ECHO LA VOL BP: 70 ML (ref 18–58)
ECHO LA VOL MOD A2C: 81 ML (ref 18–58)
ECHO LA VOL MOD A4C: 60 ML (ref 18–58)
ECHO LA VOL/BSA BIPLANE: 28 ML/M2 (ref 16–34)
ECHO LA VOLUME INDEX MOD A2C: 33 ML/M2 (ref 16–34)
ECHO LA VOLUME INDEX MOD A4C: 24 ML/M2 (ref 16–34)
ECHO LV E' LATERAL VELOCITY: 8.16 CM/S
ECHO LV E' SEPTAL VELOCITY: 7.51 CM/S
ECHO LV EDV A2C: 271 ML
ECHO LV EDV A4C: 224 ML
ECHO LV EDV INDEX A4C: 90 ML/M2
ECHO LV EDV NDEX A2C: 109 ML/M2
ECHO LV EF PHYSICIAN: 28 %
ECHO LV EJECTION FRACTION A2C: 26 %
ECHO LV EJECTION FRACTION A4C: 28 %
ECHO LV EJECTION FRACTION BIPLANE: 27 % (ref 55–100)
ECHO LV ESV A2C: 202 ML
ECHO LV ESV A4C: 161 ML
ECHO LV ESV INDEX A2C: 81 ML/M2
ECHO LV ESV INDEX A4C: 65 ML/M2
ECHO LV FRACTIONAL SHORTENING: 8 % (ref 28–44)
ECHO LV INTERNAL DIMENSION DIASTOLE INDEX: 2.85 CM/M2
ECHO LV INTERNAL DIMENSION DIASTOLIC: 7.1 CM (ref 4.2–5.9)
ECHO LV INTERNAL DIMENSION SYSTOLIC INDEX: 2.61 CM/M2
ECHO LV INTERNAL DIMENSION SYSTOLIC: 6.5 CM
ECHO LV ISOVOLUMETRIC RELAXATION TIME (IVRT): 70 MS
ECHO LV IVSD: 1.2 CM (ref 0.6–1)
ECHO LV MASS 2D: 393.9 G (ref 88–224)
ECHO LV MASS INDEX 2D: 158.2 G/M2 (ref 49–115)
ECHO LV POSTERIOR WALL DIASTOLIC: 1.1 CM (ref 0.6–1)
ECHO LV RELATIVE WALL THICKNESS RATIO: 0.31
ECHO LVOT AREA: 4.5 CM2
ECHO LVOT AV VTI INDEX: 0.75
ECHO LVOT DIAM: 2.4 CM
ECHO LVOT MEAN GRADIENT: 2 MMHG
ECHO LVOT PEAK GRADIENT: 5 MMHG
ECHO LVOT PEAK VELOCITY: 1.1 M/S
ECHO LVOT STROKE VOLUME INDEX: 36.3 ML/M2
ECHO LVOT SV: 90.4 ML
ECHO LVOT VTI: 20 CM
ECHO MV A VELOCITY: 1.1 M/S
ECHO MV AREA VTI: 3.6 CM2
ECHO MV E DECELERATION TIME (DT): 224 MS
ECHO MV E VELOCITY: 1.29 M/S
ECHO MV E/A RATIO: 1.17
ECHO MV E/E' LATERAL: 15.81
ECHO MV E/E' RATIO (AVERAGED): 16.49
ECHO MV E/E' SEPTAL: 17.18
ECHO MV LVOT VTI INDEX: 1.26
ECHO MV MAX VELOCITY: 1.4 M/S
ECHO MV MEAN GRADIENT: 4 MMHG
ECHO MV MEAN VELOCITY: 0.9 M/S
ECHO MV PEAK GRADIENT: 7 MMHG
ECHO MV REGURGITANT PEAK GRADIENT: 81 MMHG
ECHO MV REGURGITANT PEAK VELOCITY: 4.5 M/S
ECHO MV REGURGITANT VTIA: 157 CM
ECHO MV VTI: 25.2 CM
ECHO RA AREA 4C: 22.2 CM2
ECHO RA END SYSTOLIC VOLUME APICAL 4 CHAMBER INDEX BSA: 27 ML/M2
ECHO RA VOLUME: 68 ML
ECHO RV BASAL DIMENSION: 3.7 CM
ECHO RV FREE WALL PEAK S': 11.5 CM/S
ECHO RV LONGITUDINAL DIMENSION: 9.5 CM
ECHO RV MID DIMENSION: 3 CM
ECHO RV TAPSE: 2.3 CM (ref 1.7–?)
EKG ATRIAL RATE: 74 BPM
EKG DIAGNOSIS: NORMAL
EKG P AXIS: 33 DEGREES
EKG P-R INTERVAL: 296 MS
EKG Q-T INTERVAL: 466 MS
EKG QRS DURATION: 180 MS
EKG QTC CALCULATION (BAZETT): 517 MS
EKG R AXIS: 96 DEGREES
EKG T AXIS: -47 DEGREES
EKG VENTRICULAR RATE: 74 BPM
EOSINOPHIL # BLD: 0 K/UL (ref 0–0.6)
EOSINOPHIL NFR BLD: 0 %
GFR SERPLBLD CREATININE-BSD FMLA CKD-EPI: 52 ML/MIN/{1.73_M2}
GLUCOSE BLD-MCNC: 191 MG/DL (ref 70–99)
GLUCOSE BLD-MCNC: 200 MG/DL (ref 70–99)
GLUCOSE BLD-MCNC: 204 MG/DL (ref 70–99)
GLUCOSE BLD-MCNC: 219 MG/DL (ref 70–99)
GLUCOSE SERPL-MCNC: 205 MG/DL (ref 70–99)
HCT VFR BLD AUTO: 49.4 % (ref 40.5–52.5)
HGB BLD-MCNC: 16.3 G/DL (ref 13.5–17.5)
LYMPHOCYTES # BLD: 1.4 K/UL (ref 1–5.1)
LYMPHOCYTES NFR BLD: 11.9 %
MAGNESIUM SERPL-MCNC: 2.52 MG/DL (ref 1.8–2.4)
MCH RBC QN AUTO: 29.4 PG (ref 26–34)
MCHC RBC AUTO-ENTMCNC: 33.1 G/DL (ref 31–36)
MCV RBC AUTO: 88.9 FL (ref 80–100)
MONOCYTES # BLD: 0.9 K/UL (ref 0–1.3)
MONOCYTES NFR BLD: 7.6 %
NEUTROPHILS # BLD: 9.7 K/UL (ref 1.7–7.7)
NEUTROPHILS NFR BLD: 80.3 %
PERFORMED ON: ABNORMAL
PHOSPHATE SERPL-MCNC: 4.1 MG/DL (ref 2.5–4.9)
PLATELET # BLD AUTO: 221 K/UL (ref 135–450)
PMV BLD AUTO: 8 FL (ref 5–10.5)
POTASSIUM SERPL-SCNC: 5 MMOL/L (ref 3.5–5.1)
RBC # BLD AUTO: 5.55 M/UL (ref 4.2–5.9)
SODIUM SERPL-SCNC: 138 MMOL/L (ref 136–145)
WBC # BLD AUTO: 12.1 K/UL (ref 4–11)

## 2025-03-10 PROCEDURE — 94660 CPAP INITIATION&MGMT: CPT

## 2025-03-10 PROCEDURE — 93306 TTE W/DOPPLER COMPLETE: CPT | Performed by: INTERNAL MEDICINE

## 2025-03-10 PROCEDURE — 83735 ASSAY OF MAGNESIUM: CPT

## 2025-03-10 PROCEDURE — 6370000000 HC RX 637 (ALT 250 FOR IP)

## 2025-03-10 PROCEDURE — 86038 ANTINUCLEAR ANTIBODIES: CPT

## 2025-03-10 PROCEDURE — 2700000000 HC OXYGEN THERAPY PER DAY

## 2025-03-10 PROCEDURE — 2500000003 HC RX 250 WO HCPCS: Performed by: INTERNAL MEDICINE

## 2025-03-10 PROCEDURE — 99232 SBSQ HOSP IP/OBS MODERATE 35: CPT | Performed by: STUDENT IN AN ORGANIZED HEALTH CARE EDUCATION/TRAINING PROGRAM

## 2025-03-10 PROCEDURE — 83516 IMMUNOASSAY NONANTIBODY: CPT

## 2025-03-10 PROCEDURE — 6360000004 HC RX CONTRAST MEDICATION

## 2025-03-10 PROCEDURE — 6370000000 HC RX 637 (ALT 250 FOR IP): Performed by: INTERNAL MEDICINE

## 2025-03-10 PROCEDURE — 2500000003 HC RX 250 WO HCPCS: Performed by: NURSE PRACTITIONER

## 2025-03-10 PROCEDURE — 94761 N-INVAS EAR/PLS OXIMETRY MLT: CPT

## 2025-03-10 PROCEDURE — 6370000000 HC RX 637 (ALT 250 FOR IP): Performed by: NURSE PRACTITIONER

## 2025-03-10 PROCEDURE — 99222 1ST HOSP IP/OBS MODERATE 55: CPT | Performed by: INTERNAL MEDICINE

## 2025-03-10 PROCEDURE — 93306 TTE W/DOPPLER COMPLETE: CPT

## 2025-03-10 PROCEDURE — 6360000002 HC RX W HCPCS: Performed by: INTERNAL MEDICINE

## 2025-03-10 PROCEDURE — 36415 COLL VENOUS BLD VENIPUNCTURE: CPT

## 2025-03-10 PROCEDURE — 93010 ELECTROCARDIOGRAM REPORT: CPT | Performed by: INTERNAL MEDICINE

## 2025-03-10 PROCEDURE — 1200000000 HC SEMI PRIVATE

## 2025-03-10 PROCEDURE — 80048 BASIC METABOLIC PNL TOTAL CA: CPT

## 2025-03-10 PROCEDURE — 85025 COMPLETE CBC W/AUTO DIFF WBC: CPT

## 2025-03-10 PROCEDURE — 94640 AIRWAY INHALATION TREATMENT: CPT

## 2025-03-10 PROCEDURE — 84100 ASSAY OF PHOSPHORUS: CPT

## 2025-03-10 RX ORDER — AMIODARONE HYDROCHLORIDE 200 MG/1
200 TABLET ORAL 2 TIMES DAILY
Status: DISCONTINUED | OUTPATIENT
Start: 2025-03-10 | End: 2025-03-14 | Stop reason: HOSPADM

## 2025-03-10 RX ORDER — RANOLAZINE 500 MG/1
500 TABLET, EXTENDED RELEASE ORAL 2 TIMES DAILY
Status: DISCONTINUED | OUTPATIENT
Start: 2025-03-10 | End: 2025-03-14 | Stop reason: HOSPADM

## 2025-03-10 RX ORDER — NITROGLYCERIN 0.4 MG/1
0.4 TABLET SUBLINGUAL EVERY 5 MIN PRN
Status: DISCONTINUED | OUTPATIENT
Start: 2025-03-10 | End: 2025-03-14 | Stop reason: HOSPADM

## 2025-03-10 RX ORDER — PREDNISONE 20 MG/1
40 TABLET ORAL DAILY
Status: DISCONTINUED | OUTPATIENT
Start: 2025-03-11 | End: 2025-03-13

## 2025-03-10 RX ADMIN — TRANEXAMIC ACID 500 MG: 100 INJECTION, SOLUTION INTRAVENOUS at 16:01

## 2025-03-10 RX ADMIN — METOPROLOL SUCCINATE 50 MG: 50 TABLET, FILM COATED, EXTENDED RELEASE ORAL at 08:59

## 2025-03-10 RX ADMIN — IPRATROPIUM BROMIDE 2 SPRAY: 21 SPRAY NASAL at 08:59

## 2025-03-10 RX ADMIN — AMIODARONE HYDROCHLORIDE 200 MG: 200 TABLET ORAL at 20:16

## 2025-03-10 RX ADMIN — PREDNISONE 40 MG: 20 TABLET ORAL at 08:59

## 2025-03-10 RX ADMIN — INSULIN LISPRO 4 UNITS: 100 INJECTION, SOLUTION INTRAVENOUS; SUBCUTANEOUS at 12:34

## 2025-03-10 RX ADMIN — BUDESONIDE 500 MCG: 0.5 SUSPENSION RESPIRATORY (INHALATION) at 08:59

## 2025-03-10 RX ADMIN — DOXYCYCLINE HYCLATE 100 MG: 100 TABLET, FILM COATED ORAL at 08:59

## 2025-03-10 RX ADMIN — PAROXETINE HYDROCHLORIDE 20 MG: 20 TABLET, FILM COATED ORAL at 08:59

## 2025-03-10 RX ADMIN — BUDESONIDE 500 MCG: 0.5 SUSPENSION RESPIRATORY (INHALATION) at 19:09

## 2025-03-10 RX ADMIN — SULFUR HEXAFLUORIDE 2 ML: KIT at 11:51

## 2025-03-10 RX ADMIN — RANOLAZINE 500 MG: 500 TABLET, FILM COATED, EXTENDED RELEASE ORAL at 20:17

## 2025-03-10 RX ADMIN — AMIODARONE HYDROCHLORIDE 400 MG: 200 TABLET ORAL at 08:59

## 2025-03-10 RX ADMIN — IPRATROPIUM BROMIDE 2 SPRAY: 21 SPRAY NASAL at 20:23

## 2025-03-10 RX ADMIN — RANOLAZINE 500 MG: 500 TABLET, FILM COATED, EXTENDED RELEASE ORAL at 12:34

## 2025-03-10 RX ADMIN — INSULIN LISPRO 4 UNITS: 100 INJECTION, SOLUTION INTRAVENOUS; SUBCUTANEOUS at 08:59

## 2025-03-10 RX ADMIN — DOXYCYCLINE HYCLATE 100 MG: 100 TABLET, FILM COATED ORAL at 20:17

## 2025-03-10 RX ADMIN — SODIUM CHLORIDE, PRESERVATIVE FREE 10 ML: 5 INJECTION INTRAVENOUS at 09:00

## 2025-03-10 RX ADMIN — MONTELUKAST 10 MG: 10 TABLET, FILM COATED ORAL at 20:16

## 2025-03-10 RX ADMIN — INSULIN LISPRO 4 UNITS: 100 INJECTION, SOLUTION INTRAVENOUS; SUBCUTANEOUS at 20:17

## 2025-03-10 RX ADMIN — INSULIN LISPRO 4 UNITS: 100 INJECTION, SOLUTION INTRAVENOUS; SUBCUTANEOUS at 18:32

## 2025-03-10 RX ADMIN — FUROSEMIDE 20 MG: 20 TABLET ORAL at 08:59

## 2025-03-10 RX ADMIN — SODIUM CHLORIDE, PRESERVATIVE FREE 10 ML: 5 INJECTION INTRAVENOUS at 20:18

## 2025-03-10 ASSESSMENT — PAIN DESCRIPTION - LOCATION
LOCATION: BACK
LOCATION: BACK

## 2025-03-10 ASSESSMENT — PAIN SCALES - GENERAL
PAINLEVEL_OUTOF10: 6
PAINLEVEL_OUTOF10: 0
PAINLEVEL_OUTOF10: 6

## 2025-03-10 NOTE — ACP (ADVANCE CARE PLANNING)
Advance Care Planning     Advance Care Planning Inpatient Note  [facility] Department    Today's Date: 3/10/2025  Unit: [place] [facility] - MED SURG    Received request from patient and Other: significant other .  Upon review of chart and communication with care team, patient's decision making abilities are not in question.. Patient and significant other  was/were present in the room during visit.    Goals of ACP Conversation:  Discuss advance care planning documents    Health Care Decision Makers:       Primary Decision Maker: ELENITA SUAREZ - Other - [phone]    Secondary Decision Maker: Julián Partida - Other Relative - [phone]  Summary:  Completed New Documents  Updated Healthcare Decision Maker    Advance Care Planning Documents (Patient Wishes):  Healthcare Power of /Advance Directive Appointment of Health Care Agent  Living Will/Advance Directive     Assessment:  Pt not wanting to be on life support if no chance of recovery, but wanting to wait \"a few days\" to make sure he will not wake up or recover.     Interventions:  Provided education on documents for clarity and greater understanding  Discussed and provided education on state decision maker hierarchy  Assisted in the completion of documents according to patient's wishes at this time    Care Preferences Communicated:   No    Outcomes/Plan:  ACP Discussion: Completed  New advance directive completed.  Returned original document(s) to patient, as well as copies for distribution to appointed agents  Copy of advance directive given to staff to scan into medical record.    Electronically signed by Chaplain Zeinab on 3/10/2025 at 3:33 PM

## 2025-03-10 NOTE — ACP (ADVANCE CARE PLANNING)
Met with visitor for Advance Directives consult. Pt busy at this time. Left copy of AD forms with visitor and assured her we would check back later today to help complete forms.    Ortiz Tong

## 2025-03-10 NOTE — PLAN OF CARE
CHF Care Plan      Patient's EF (Ejection Fraction) is- ECHO ordered    Heart Failure Medications:  Diuretics:: Furosemide    (One of the following REQUIRED for EF </= 40%/SYSTOLIC FAILURE but MAY be used in EF% >40%/DIASTOLIC FAILURE)        ACE:: None        ARB:: None         ARNI:: None    (Beta Blockers)  NON- Evidenced Based Beta Blocker (for EF% >40%/DIASTOLIC FAILURE): None    Evidenced Based Beta Blocker::(REQUIRED for EF% <40%/SYSTOLIC FAILURE) Metoprolol SUCCinate- Toprol XL  ...................................................................................................................................................    Failed to redirect to the Timeline version of the [employer] SmartLink.      Patient's weights and intake/output reviewed    Daily Weight log at bedside, patient/family participation in use of log: \"yes    Patient's current weight today shows a difference of 0 lbs than last documented weight.      Intake/Output Summary (Last 24 hours) at 3/10/2025 1033  Last data filed at 3/10/2025 0909  Gross per 24 hour   Intake 1080 ml   Output 2650 ml   Net -1570 ml       Education Booklet Provided: yes    Comorbidities Reviewed Yes    Patient has a past medical history of Asthma, CAD (coronary artery disease), CHF (congestive heart failure) ([facility]), Hyperlipidemia, Hypertension, SINDY (obstructive sleep apnea), PAF (paroxysmal atrial fibrillation) ([facility]), and Type 2 diabetes mellitus ([facility]).     >>For CHF and Comorbidity documentation on Education Time and Topics, please see Education Tab      CHF Education    Learners:  Patient  Readineess:   Acceptance  Method:   Explanation  Response:   Verbalizes Understanding  Comments: none    Time Spent: 5 min      Pt resting in bed at this time on  2 L O2. Pt denies shortness of breath. Pt with nonpitting lower extremity edema.     Patient and/or Family's stated Goal of Care this Admission: increase activity tolerance, better understand heart failure and disease  management, be more comfortable, and reduce lower extremity edema prior to discharge        :

## 2025-03-10 NOTE — CONSULTS
CARDIOLOGY CONSULTATION        Patient Name: Dinesh Partida  Date of admission: 3/7/2025  7:49 PM  Admission Dx: Hypoxia [R09.02]  Acute on chronic congestive heart failure, unspecified heart failure type (HCC) [I50.9]  Acute hypoxic respiratory failure (HCC) [J96.01]  Requesting Physician: Leighton Abdul MD  Primary Care physician: Zee Patel MD    Reason for Consultation/Chief Complaint: SOB, hemoptysis    History of Present Illness:     Dinesh Partida is a 59 y.o. man with severe two vessel CAD, AFIB, ICD (CRT-D), severe chronic HFrEF (EF 20-25%), COPD, SINDY, CKD, HTN, and HLP admitted for SOB.  Per chart notes, patient presented hypoxic in the 80s and reports hemoptysis.  Of note, he was recently admitted for multifocal PNA three weeks ago.  Patient is followed by cardiology providers (Dr Melgar and Dr Singh)e at [facility] with last clinic visit 01/07/2025 for device check.  Patient's wife is at bedside.  He reports chest pain episodes yesterday while sitting in a chair in his room.  He is still having mild hemoptysis when coughing.  He states his SOB is mildly improved and denies palpitations and LE edema.  Last LHC in 09/2023 at [facility] showed occluded stents with severe CAD with collaterals, so no further intervention was completed.  Labs in ED showed elevated BNP, KERRY at 1.5, and elevated liver enzymes.  Cardiology consulted to assist with management.    Past Medical History:   has a past medical history of Asthma, CAD (coronary artery disease), CHF (congestive heart failure) (HCC), Hyperlipidemia, Hypertension, SINDY (obstructive sleep apnea), PAF (paroxysmal atrial fibrillation) (HCC), and Type 2 diabetes mellitus (HCC).    Surgical History:   has a past surgical history that includes Pacemaker insertion (Left) and Cardiac electrophysiology study and ablation.     Social History:   reports that he quit smoking about 7 years ago. His smoking use included cigarettes. He started  03/10/2025 05:43 AM    RDW 17.7 03/10/2025 05:43 AM     03/10/2025 05:43 AM     CMP:  Lab Results   Component Value Date/Time     03/10/2025 05:43 AM    K 5.0 03/10/2025 05:43 AM    K 4.5 03/07/2025 09:41 PM     03/10/2025 05:43 AM    CO2 23 03/10/2025 05:43 AM    BUN 44 03/10/2025 05:43 AM    CREATININE 1.5 03/10/2025 05:43 AM    AGRATIO 1.2 03/07/2025 09:41 PM    LABGLOM 52 03/10/2025 05:43 AM    GLUCOSE 205 03/10/2025 05:43 AM    CALCIUM 9.6 03/10/2025 05:43 AM    BILITOT 0.3 03/09/2025 06:49 AM    ALKPHOS 84 03/09/2025 06:49 AM    AST 59 03/09/2025 06:49 AM     03/09/2025 06:49 AM     PT/INR:  No results found for: \"PTINR\"  HgBA1c:  Lab Results   Component Value Date    LABA1C 7.2 02/03/2025     No results found for: \"CKTOTAL\", \"CKMB\", \"CKMBINDEX\", \"TROPONINI\"    No results found for: \"CHOL\"  No results found for: \"TRIG\"  No results found for: \"HDL\"  No components found for: \"LDLCHOLESTEROL\", \"LDLCALC\"  No results found for: \"VLDL\"  No results found for: \"CHOLHDLRATIO\"     Cardiac Data:     Telemetry personally reviewed:    K+ 5.0  Creatinine 1.5  NT ProBNP 650    AST 59  WBC 12.1    EKG 03/08/2025: AV paced @ 74 bpm    Echo 10/03/2024 @ [facility]: Summary  Left ventricle is dilated.   Overall left ventricular ejection fraction is estimated to be 20-25%.   Left ventricular function is severely reduced.   Pacemaker lead present in the right ventricle.   Septal motion is consistent with conduction abnormality.   No significant valvular abnormalities identified.     Stress Test:    Cardiac catheterization 09/18/2023 @ [facility]: IMPRESSION:   2v CAD involving the LCx and RCA with collateralization; LAD evaluated   with hemodynamic assessment and probably non-flow limiting stenosis as   supplying two vessels.      Normal systemic pressure   Normal LVEDP   Successful hemostasis of the right radial artery     Additional studies:   CT Chest 03/07/2025: IMPRESSION:  1.  No evidence of

## 2025-03-10 NOTE — PLAN OF CARE
CHF Care Plan      Patient's EF (Ejection Fraction) is= echo ordered    Heart Failure Medications:  Diuretics:: Furosemide held    (One of the following REQUIRED for EF </= 40%/SYSTOLIC FAILURE but MAY be used in EF% >40%/DIASTOLIC FAILURE)        ACE:: None        ARB:: None         ARNI:: Sacubitril/Valsartan-Entresto    (Beta Blockers)  NON- Evidenced Based Beta Blocker (for EF% >40%/DIASTOLIC FAILURE): None    Evidenced Based Beta Blocker::(REQUIRED for EF% <40%/SYSTOLIC FAILURE) Metoprolol SUCCinate- Toprol XL  ...................................................................................................................................................    Failed to redirect to the Timeline version of the [employer] SmartLink.      Patient's weights and intake/output reviewed    Daily Weight log at bedside, patient/family participation in use of log: \"yes    Patient's current weight today shows a difference of 1 lbs less than last documented weight.      Intake/Output Summary (Last 24 hours) at 3/10/2025 0607  Last data filed at 3/10/2025 0326  Gross per 24 hour   Intake 1440 ml   Output 2450 ml   Net -1010 ml       Education Booklet Provided: yes    Comorbidities Reviewed Yes    Patient has a past medical history of Asthma, CAD (coronary artery disease), CHF (congestive heart failure) (HCC), Hyperlipidemia, Hypertension, SINDY (obstructive sleep apnea), PAF (paroxysmal atrial fibrillation) (HCC), and Type 2 diabetes mellitus (HCC).     >>For CHF and Comorbidity documentation on Education Time and Topics, please see Education Tab      CHF Education    Learners:  Patient  Readineess:   Acceptance  Method:   Explanation  Response:   Verbalizes Understanding  Comments:     Time Spent: 5      Pt resting in bed at this time on CPAP. Pt denies shortness of breath. Pt with pitting lower extremity edema.     Patient and/or Family's stated Goal of Care this Admission: reduce shortness of breath, increase activity tolerance,

## 2025-03-10 NOTE — PROGRESS NOTES
03/10/25 0031   NIV Type   $NIV $Daily Charge   Equipment Type v60   Mode CPAP   Mask Type Full face mask   Mask Size Large   Assessment   Respirations 18   SpO2 98 %   Comfort Level Good   Using Accessory Muscles No   Mask Compliance Good   Settings/Measurements   PIP Observed 16 cm H20   CPAP/EPAP 16 cmH2O   Vt (Measured) 756 mL   FiO2  40 %   Minute Volume (L/min) 13.2 Liters   Mask Leak (lpm) 0 lpm   Patient's Home Machine No   Alarm Settings   Alarms On Y   Low Pressure (cmH2O) 6 cmH2O   High Pressure (cmH2O) 30 cmH2O   Apnea (secs) 20 secs   RR Low (bpm) 6   RR High (bpm) 40 br/min

## 2025-03-10 NOTE — CARE COORDINATION
Case Management Assessment  Initial Evaluation    Date/Time of Evaluation: 3/10/2025 2:58 PM  Assessment Completed by: Andressa Mendez RN    If patient is discharged prior to next notation, then this note serves as note for discharge by case management.    Patient Name: Dinesh Partida                   YOB: 1965  Diagnosis: Hypoxia [R09.02]  Acute on chronic congestive heart failure, unspecified heart failure type (HCC) [I50.9]  Acute hypoxic respiratory failure (HCC) [J96.01]                   Date / Time: 3/7/2025  7:49 PM    Patient Admission Status: Inpatient   Readmission Risk (Low < 19, Mod (19-27), High > 27): Readmission Risk Score: 19.1    Current PCP: Zee Patel MD  PCP verified by CM? Yes    Chart Reviewed: Yes      History Provided by: Patient  Patient Orientation: Alert and Oriented, Person, Place, Situation, Self    Patient Cognition: Alert    Hospitalization in the last 30 days (Readmission):  No    If yes, Readmission Assessment in  Navigator will be completed.    Advance Directives:      Code Status: Full Code   Patient's Primary Decision Maker is: Legal Next of Kin      Discharge Planning:    Patient lives with: Spouse/Significant Other Type of Home: House  Primary Care Giver: Self  Patient Support Systems include: Spouse/Significant Other   Current Financial resources: Medicare  Current community resources: None  Current services prior to admission: Oxygen Therapy (bought O2 Privately)            Current DME:              Type of Home Care services:  None    ADLS  Prior functional level: Independent in ADLs/IADLs  Current functional level: Independent in ADLs/IADLs    PT AM-PAC: 22 /24  OT AM-PAC: 22 /24    Family can provide assistance at DC: Yes  Would you like Case Management to discuss the discharge plan with any other family members/significant others, and if so, who? Yes (Chloe)  Plans to Return to Present Housing: Yes  Other Identified Issues/Barriers to RETURNING to  Patient and/or Patient Representative Agree with the Discharge Plan? Yes    Andressa Mendez RN  Case Management Department  Ph: [phone]

## 2025-03-10 NOTE — PLAN OF CARE
Problem: Chronic Conditions and Co-morbidities  Goal: Patient's chronic conditions and co-morbidity symptoms are monitored and maintained or improved  3/10/2025 0944 by Bernarda Wood RN  Outcome: Progressing  3/9/2025 2257 by Radha Escobar RN  Outcome: Progressing  Flowsheets (Taken 3/9/2025 2055)  Care Plan - Patient's Chronic Conditions and Co-Morbidity Symptoms are Monitored and Maintained or Improved: Monitor and assess patient's chronic conditions and comorbid symptoms for stability, deterioration, or improvement     Problem: Discharge Planning  Goal: Discharge to home or other facility with appropriate resources  3/10/2025 0944 by Bernarda Wood RN  Outcome: Progressing  3/9/2025 2257 by Radha Escobar RN  Outcome: Progressing     Problem: Pain  Goal: Verbalizes/displays adequate comfort level or baseline comfort level  3/10/2025 0944 by Bernarda Wood RN  Outcome: Progressing  3/9/2025 2257 by Radha Escobar RN  Outcome: Progressing     Problem: Safety - Adult  Goal: Free from fall injury  3/10/2025 0944 by Bernarda Wood RN  Outcome: Progressing  3/9/2025 2257 by Radha Escobar RN  Outcome: Progressing     Problem: ABCDS Injury Assessment  Goal: Absence of physical injury  3/10/2025 0944 by Bernarda Wood RN  Outcome: Progressing  3/9/2025 2257 by Radha Escobar RN  Outcome: Progressing     Problem: Respiratory - Adult  Goal: Achieves optimal ventilation and oxygenation  3/10/2025 0944 by Bernarda Wood RN  Outcome: Progressing  3/9/2025 2257 by Radha Escobar RN  Outcome: Progressing     Problem: Cardiovascular - Adult  Goal: Maintains optimal cardiac output and hemodynamic stability  3/10/2025 0944 by Bernarda Wood RN  Outcome: Progressing  3/9/2025 2257 by Radha Escobar RN  Outcome: Progressing  Flowsheets (Taken 3/9/2025 2055)  Maintains optimal cardiac output and hemodynamic stability: Monitor blood pressure and heart rate  Goal: Absence of cardiac dysrhythmias or at baseline  3/10/2025 0944 by

## 2025-03-10 NOTE — PROGRESS NOTES
03/10/25 0415   NIV Type   Equipment Type v60.   Mode CPAP   Mask Type Full face mask   Mask Size Large   Assessment   Respirations 19   SpO2 98 %   Comfort Level Good   Using Accessory Muscles No   Mask Compliance Good   Settings/Measurements   PIP Observed 16 cm H20   CPAP/EPAP 16 cmH2O   Vt (Measured) 518 mL   FiO2  40 %   Minute Volume (L/min) 9 Liters   Mask Leak (lpm) 19 lpm   Patient's Home Machine No   Alarm Settings   Alarms On Y   Low Pressure (cmH2O) 6 cmH2O   High Pressure (cmH2O) 30 cmH2O   Apnea (secs) 20 secs   RR Low (bpm) 6   RR High (bpm) 40 br/min

## 2025-03-10 NOTE — PROGRESS NOTES
03/10/25 1916   NIV Type   NIV Started/Stopped On   Equipment Type V60   Mode CPAP   Mask Type Full face mask   Mask Size Large   Assessment   Respirations 17   SpO2 95 %   Comfort Level Good   Using Accessory Muscles No   Mask Compliance Good   Skin Assessment Clean, dry, & intact   Skin Protection for O2 Device No  (Patient refused)   Breath Sounds   Respiratory Pattern Regular   Settings/Measurements   CPAP/EPAP 16 cmH2O   Vt (Measured) 578 mL   FiO2  40 %   Minute Volume (L/min) 9.9 Liters   Mask Leak (lpm) 18 lpm   Patient's Home Machine No   Alarm Settings   Alarms On Y

## 2025-03-10 NOTE — PLAN OF CARE
Problem: Chronic Conditions and Co-morbidities  Goal: Patient's chronic conditions and co-morbidity symptoms are monitored and maintained or improved  Outcome: Progressing     Problem: Discharge Planning  Goal: Discharge to home or other facility with appropriate resources  Outcome: Progressing     Problem: Pain  Goal: Verbalizes/displays adequate comfort level or baseline comfort level  Outcome: Progressing     Problem: Safety - Adult  Goal: Free from fall injury  Outcome: Progressing     Problem: ABCDS Injury Assessment  Goal: Absence of physical injury  Outcome: Progressing     Problem: Respiratory - Adult  Goal: Achieves optimal ventilation and oxygenation  Outcome: Progressing     Problem: Cardiovascular - Adult  Goal: Maintains optimal cardiac output and hemodynamic stability  Outcome: Progressing  Goal: Absence of cardiac dysrhythmias or at baseline  Outcome: Progressing     Problem: Skin/Tissue Integrity - Adult  Goal: Skin integrity remains intact  Outcome: Progressing  Goal: Incisions, wounds, or drain sites healing without S/S of infection  Outcome: Progressing  Goal: Oral mucous membranes remain intact  Outcome: Progressing     Problem: Musculoskeletal - Adult  Goal: Return mobility to safest level of function  Outcome: Progressing  Goal: Maintain proper alignment of affected body part  Outcome: Progressing  Goal: Return ADL status to a safe level of function  Outcome: Progressing     Problem: Gastrointestinal - Adult  Goal: Minimal or absence of nausea and vomiting  Outcome: Progressing  Goal: Maintains or returns to baseline bowel function  Outcome: Progressing  Goal: Maintains adequate nutritional intake  Outcome: Progressing  Goal: Establish and maintain optimal ostomy function  Outcome: Progressing     Problem: Metabolic/Fluid and Electrolytes - Adult  Goal: Electrolytes maintained within normal limits  Outcome: Progressing  Goal: Hemodynamic stability and optimal renal function  maintained  Outcome: Progressing  Goal: Glucose maintained within prescribed range  Outcome: Progressing     Problem: Anxiety  Goal: Will report anxiety at manageable levels  Description: INTERVENTIONS:  1. Administer medication as ordered  2. Teach and rehearse alternative coping skills  3. Provide emotional support with 1:1 interaction with staff  Outcome: Progressing

## 2025-03-10 NOTE — PROGRESS NOTES
Patient: Dinesh Partida MRN: [id number]  Date of  Admission: 3/7/2025   YOB: 1965  Age: 59 y.o.  Sex: male    Unit: [address] SURG  Room/Bed: 0358/0358-01 Admitting Physician: KHADIJAH SAN    Attending Physician:  Sergio Macias MD         Pulmonary Service Note      SUBJECTIVE:    Patient says that he is coughing up pinkish sputum.  He says that his sputum was dark prior.  He feels that he is coughing up blood.  He has no other complaints        OBJECTIVE    Medications    Continuous Infusions:   dextrose      sodium chloride         Scheduled Meds:   amiodarone  200 mg Oral BID    ranolazine  500 mg Oral BID    furosemide  20 mg Oral Daily    tranexamic acid  500 mg Nebulization q8h    [Held by provider] apixaban  5 mg Oral BID    azelastine  1 spray Each Nostril BID    metoprolol succinate  50 mg Oral Daily    montelukast  10 mg Oral Nightly    PARoxetine  20 mg Oral Daily    sodium chloride flush  5-40 mL IntraVENous 2 times per day    doxycycline hyclate  100 mg Oral 2 times per day    predniSONE  40 mg Oral Daily    insulin lispro  0-16 Units SubCUTAneous 4x Daily AC & HS    ipratropium  2 spray Each Nostril BID    budesonide  0.5 mg Nebulization BID RT       PRN Meds:  nitroGLYCERIN, fluticasone, LORazepam, glucose, dextrose bolus **OR** dextrose bolus, glucagon (rDNA), dextrose, sodium chloride flush, sodium chloride, polyethylene glycol, acetaminophen **OR** acetaminophen, prochlorperazine, albuterol    Physical    Patient Vitals for the past 24 hrs:   BP Temp Temp src Pulse Resp SpO2 Weight   03/10/25 1230 -- -- -- -- -- 93 % --   03/10/25 1145 125/71 97.6 °F (36.4 °C) Axillary 71 18 94 % --   03/10/25 0901 (!) 144/74 -- -- 70 -- -- --   03/10/25 0859 -- -- -- 82 18 97 % --   03/10/25 0730 126/69 97.7 °F (36.5 °C) Axillary 72 18 96 % --   03/10/25 0647 -- -- -- -- -- -- (!) 137.6 kg (303 lb 6.4 oz)   03/10/25 0415 -- -- -- -- 19 98 % --   03/10/25 0400 103/70 97.9 °F (36.6  nebulizer, can consider on discharge    Dinesh Pena MD, [facility] Pulmonary, Critical Care and Sleep Medicine  [phone]      Please note that some or all of this record was generated using voice recognition software. If there are any questions about the content of this document, please contact the author as some errors in transcription may have occurred.

## 2025-03-10 NOTE — DISCHARGE INSTRUCTIONS
Please schedule a follow [facility] appointment [facility].       Heart Failure Resources:  Heart Failure Interactive Workbook:  Go to https://[employer]/publication/?i=981271 for a Free Heart Failure Interactive Workbook provided by The American Heart Association. This interactive workbook will provide information on Healthier Living with Heart Failure. Please copy and paste link into search bar. Use your mouse to scroll through the pages.    HF [place] any:   Heart Failure Free smart phone any available for iPhone and Android download. Use your phone to track sodium intake, fluid intake, symptoms, and weight.     Low Sodium Diet / Recipes:  Go to www.[employer] website for “renal” diet which is Low Sodium! [employer] is a dialysis company, but this website offers free seasonal cookbooks. Each quarter, they will release 25-30 new recipes with a breakdown of calories, sodium, and glucose. You can also go to www.[employer]/recipes website for free recipes.     Home Exercise Program:   Identification of Green/Yellow/Red zones:  You should be able to identify when you feel good (green zone), if you have 1-2 symptoms of HF (yellow zone), or if you are in need of medical attention (red zone).  In your CHF education folder you were provided a “stop light tool” to outline this information.     We want to you to rate your exertion levels:    Our therapy team has discussed means of identification with you such as the \"Elisabeth scale.\"  The Elisabeth rating scale ranges from 6 to 20, where 6 means \"no exertion at all\" and 20 means \"maximal exertion.\" The goal is to use this to gauge how much effort it is taking for you to do your normal daily tasks.   You should be able to recognize when too much exertion is being expended.    Elements of Energy Conservation:   Prioritize/Plan: Decide what needs to be done today, and what can wait for a later date, write to do lists, plan ahead to avoid extra trips, and

## 2025-03-10 NOTE — PROGRESS NOTES
03/09/25 2220   NIV Type   NIV Started/Stopped On   Equipment Type v60   Mode CPAP   Mask Type Full face mask   Mask Size Large   Assessment   Respirations 17   Comfort Level Good   Using Accessory Muscles No   Mask Compliance Good   Settings/Measurements   CPAP/EPAP 16 cmH2O   Vt (Measured) 320 mL   FiO2  40 %   Minute Volume (L/min) 4.7 Liters   Mask Leak (lpm) 25 lpm   Patient's Home Machine No   Alarm Settings   Alarms On Y   Low Pressure (cmH2O) 6 cmH2O   High Pressure (cmH2O) 30 cmH2O   Delay Alarm 20 sec(s)   RR Low (bpm) 6   RR High (bpm) 40 br/min

## 2025-03-10 NOTE — PROGRESS NOTES
[facility] ([facility])   Nephrology Note  [id number] / [phone] / [phone]   [email or web address]      Subjective:   Dinesh Partida: 59 y.o.  male who we are seeing in consultation for CKD.    SUBJECTIVE:    - The patient was seen and examined; he feels fine today with no CP, SOB, nausea or vomiting. +cough   - ROS: No fever or chills.  - Social: discussed with family at bedside.    Physical Exam:    VITALS:  /69   Pulse 72   Temp 97.7 °F (36.5 °C) (Axillary)   Resp 18   Ht 1.778 m (5' 10\")   Wt (!) 137.6 kg (303 lb 6.4 oz)   SpO2 96%   BMI 43.53 kg/m²     General appearance: Seems comfortable, no acute distress.  Neck: Trachea midline, thyroid normal.   Lungs:  Non labored breathing, CTA to anterior auscultation.  Heart:  S1S2 normal, rub or gallop. No peripheral edema.  Abdomen: Soft, non-tender, no organomegaly.   Skin: No lesions or rashes, warm to touch.     DATA:    CBC with Differential:    Lab Results   Component Value Date/Time    WBC 12.1 03/10/2025 05:43 AM    RBC 5.55 03/10/2025 05:43 AM    HGB 16.3 03/10/2025 05:43 AM    HCT 49.4 03/10/2025 05:43 AM     03/10/2025 05:43 AM    MCV 88.9 03/10/2025 05:43 AM    MCH 29.4 03/10/2025 05:43 AM    MCHC 33.1 03/10/2025 05:43 AM    RDW 17.7 03/10/2025 05:43 AM    LYMPHOPCT 11.9 03/10/2025 05:43 AM    MONOPCT 7.6 03/10/2025 05:43 AM    MYELOPCT 2 02/02/2025 09:53 PM    EOSPCT 0.0 03/10/2025 05:43 AM    BASOPCT 0.2 03/10/2025 05:43 AM    MONOSABS 0.9 03/10/2025 05:43 AM    LYMPHSABS 1.4 03/10/2025 05:43 AM    EOSABS 0.0 03/10/2025 05:43 AM    BASOSABS 0.0 03/10/2025 05:43 AM     BMP:    Lab Results   Component Value Date/Time     03/10/2025 05:43 AM    K 5.0 03/10/2025 05:43 AM    K 4.5 03/07/2025 09:41 PM     03/10/2025 05:43 AM    CO2 23 03/10/2025 05:43 AM    BUN 44 03/10/2025 05:43 AM    CREATININE 1.5 03/10/2025 05:43 AM    CALCIUM 9.6 03/10/2025 05:43 AM    LABGLOM 52 03/10/2025 05:43 AM    GLUCOSE

## 2025-03-10 NOTE — PROGRESS NOTES
[facility] Medicine Progress Note  V 1.6      Date of Admission: 3/7/2025    Hospital Day: 4      Chief Admission Complaint:  SOB    Subjective:  Patient seen at bedside this morning. Patient no acute concerns. Patient no fevers, chills, chest pain, sob, hypoxic events. Patient tolerated CPAP overnight.    Presenting Admission History:       Dinesh Partida is a/an 59 y.o. male with a significant past medical history of hypertension, hyperlipidemia, CAD, PAF, HFrEF, SINDY, and type 2 diabetes who presents to [facility]'s emergency department with complaint of 24 hours of shortness of breath that began sometime yesterday afternoon.  He notes that he has intermittent dyspnea, does not require supplemental O2 at home except during sleep with his CPAP. He was hypoxic on arrival with an SpO2 of 85% on RA, requiring 2LNC in the ED. Denies any particular symptoms aside from dyspnea, no palpitations, no chest pain, no cough no chest congestion, no weight gain, no BLE edema.  He admits this happened about a month ago after having dyspnea for a week he was admitted.  However this time, denies any other symptoms.  The dyspnea is noted mostly with exertion, but sometimes feels it at rest as well.  His evaluation here included laboratory studies, EKG, chest x-ray, and CT PE protocol.  CT of the chest did not show any acute PE, there are persistent but improving infiltrates bilaterally, cardiomegaly and emphysema noted.  Laboratory studies reviewed and pertinent for creatinine 1.8, glucose 1 7, proBNP 650, troponin 20 with repeat of 16, ALT 1 6, AST 57, WBC 7.6.  Flu and COVID testing were negative.  VBG showed pH 7.30, pCO2 46, pO2 34, HCO3 22.  Patient started on vancomycin and cefepime in ED along with a DuoNeb and Solu-Medrol 125mg IVP.  Hospital team consulted to admit.     Assessment/Plan:      Current Principal Problem:  Acute hypoxic respiratory failure (HCC)    Acute Hypoxic Respiratory Failure  COPD  - Admit    PHOS  --  4.6 4.1     Recent Labs     03/07/25  2141 03/07/25  2353 03/09/25  1243   PROBNP 650*  --   --    TROPHS 20 16 15     No results for input(s): \"LABA1C\" in the last 72 hours.  Recent Labs     03/07/25  2141 03/09/25  0649   AST 57* 59*   * 137*   BILIDIR  --  0.2   BILITOT 0.4 0.3   ALKPHOS 100 84     No results for input(s): \"INR\", \"LACTA\", \"TSH\" in the last 72 hours.    Urine Cultures: No results found for: \"LABURIN\"  Blood Cultures:   Lab Results   Component Value Date/Time    BC No Growth after 4 days of incubation. 02/02/2025 11:14 PM     Lab Results   Component Value Date/Time    BLOODCULT2 No Growth after 4 days of incubation. 02/02/2025 11:14 PM     Organism: No results found for: \"ORG\"      Sergio Macias MD

## 2025-03-11 PROBLEM — I25.10 CAD IN NATIVE ARTERY: Status: ACTIVE | Noted: 2025-03-11

## 2025-03-11 PROBLEM — I50.22 CHRONIC HFREF (HEART FAILURE WITH REDUCED EJECTION FRACTION) (HCC): Status: ACTIVE | Noted: 2025-03-11

## 2025-03-11 LAB
ANA SER QL IA: NEGATIVE
ANION GAP SERPL CALCULATED.3IONS-SCNC: 13 MMOL/L (ref 3–16)
ANION GAP SERPL CALCULATED.3IONS-SCNC: 18 MMOL/L (ref 3–16)
BASOPHILS # BLD: 0 K/UL (ref 0–0.2)
BASOPHILS NFR BLD: 0.3 %
BM IGG SER IF-ACNC: 0 AU/ML (ref 0–19)
BUN SERPL-MCNC: 43 MG/DL (ref 7–20)
BUN SERPL-MCNC: 47 MG/DL (ref 7–20)
CALCIUM SERPL-MCNC: 9.5 MG/DL (ref 8.3–10.6)
CALCIUM SERPL-MCNC: 9.5 MG/DL (ref 8.3–10.6)
CHLORIDE SERPL-SCNC: 102 MMOL/L (ref 99–110)
CHLORIDE SERPL-SCNC: 97 MMOL/L (ref 99–110)
CO2 SERPL-SCNC: 17 MMOL/L (ref 21–32)
CO2 SERPL-SCNC: 22 MMOL/L (ref 21–32)
CREAT SERPL-MCNC: 1.5 MG/DL (ref 0.8–1.3)
CREAT SERPL-MCNC: 2 MG/DL (ref 0.8–1.3)
DEPRECATED RDW RBC AUTO: 17.3 % (ref 12.4–15.4)
EOSINOPHIL # BLD: 0 K/UL (ref 0–0.6)
EOSINOPHIL NFR BLD: 0.1 %
GFR SERPLBLD CREATININE-BSD FMLA CKD-EPI: 37 ML/MIN/{1.73_M2}
GFR SERPLBLD CREATININE-BSD FMLA CKD-EPI: 52 ML/MIN/{1.73_M2}
GLUCOSE BLD-MCNC: 165 MG/DL (ref 70–99)
GLUCOSE BLD-MCNC: 175 MG/DL (ref 70–99)
GLUCOSE BLD-MCNC: 187 MG/DL (ref 70–99)
GLUCOSE BLD-MCNC: 269 MG/DL (ref 70–99)
GLUCOSE BLD-MCNC: 295 MG/DL (ref 70–99)
GLUCOSE SERPL-MCNC: 192 MG/DL (ref 70–99)
GLUCOSE SERPL-MCNC: 338 MG/DL (ref 70–99)
HCT VFR BLD AUTO: 48.8 % (ref 40.5–52.5)
HGB BLD-MCNC: 16.2 G/DL (ref 13.5–17.5)
LYMPHOCYTES # BLD: 1.5 K/UL (ref 1–5.1)
LYMPHOCYTES NFR BLD: 17.3 %
MAGNESIUM SERPL-MCNC: 2.2 MG/DL (ref 1.8–2.4)
MCH RBC QN AUTO: 29.5 PG (ref 26–34)
MCHC RBC AUTO-ENTMCNC: 33.2 G/DL (ref 31–36)
MCV RBC AUTO: 88.9 FL (ref 80–100)
MONOCYTES # BLD: 0.6 K/UL (ref 0–1.3)
MONOCYTES NFR BLD: 7.2 %
MYELOPEROXIDASE AB SER-ACNC: 0 AU/ML (ref 0–19)
NEUTROPHILS # BLD: 6.3 K/UL (ref 1.7–7.7)
NEUTROPHILS NFR BLD: 75.1 %
PERFORMED ON: ABNORMAL
PLATELET # BLD AUTO: 211 K/UL (ref 135–450)
PMV BLD AUTO: 7.9 FL (ref 5–10.5)
POTASSIUM SERPL-SCNC: 4.9 MMOL/L (ref 3.5–5.1)
POTASSIUM SERPL-SCNC: 5.8 MMOL/L (ref 3.5–5.1)
PROTEINASE3 AB SER-ACNC: 2 AU/ML (ref 0–19)
RBC # BLD AUTO: 5.49 M/UL (ref 4.2–5.9)
SODIUM SERPL-SCNC: 132 MMOL/L (ref 136–145)
SODIUM SERPL-SCNC: 137 MMOL/L (ref 136–145)
TROPONIN, HIGH SENSITIVITY: 18 NG/L (ref 0–22)
UUN UR-MCNC: 460 MG/DL (ref 800–1666)
WBC # BLD AUTO: 8.4 K/UL (ref 4–11)

## 2025-03-11 PROCEDURE — 6370000000 HC RX 637 (ALT 250 FOR IP): Performed by: NURSE PRACTITIONER

## 2025-03-11 PROCEDURE — 99232 SBSQ HOSP IP/OBS MODERATE 35: CPT | Performed by: STUDENT IN AN ORGANIZED HEALTH CARE EDUCATION/TRAINING PROGRAM

## 2025-03-11 PROCEDURE — 6370000000 HC RX 637 (ALT 250 FOR IP)

## 2025-03-11 PROCEDURE — 83735 ASSAY OF MAGNESIUM: CPT

## 2025-03-11 PROCEDURE — 80048 BASIC METABOLIC PNL TOTAL CA: CPT

## 2025-03-11 PROCEDURE — 6370000000 HC RX 637 (ALT 250 FOR IP): Performed by: INTERNAL MEDICINE

## 2025-03-11 PROCEDURE — 93005 ELECTROCARDIOGRAM TRACING: CPT

## 2025-03-11 PROCEDURE — 99232 SBSQ HOSP IP/OBS MODERATE 35: CPT | Performed by: NURSE PRACTITIONER

## 2025-03-11 PROCEDURE — 2500000003 HC RX 250 WO HCPCS: Performed by: NURSE PRACTITIONER

## 2025-03-11 PROCEDURE — 94660 CPAP INITIATION&MGMT: CPT

## 2025-03-11 PROCEDURE — 94640 AIRWAY INHALATION TREATMENT: CPT

## 2025-03-11 PROCEDURE — 1200000000 HC SEMI PRIVATE

## 2025-03-11 PROCEDURE — 84484 ASSAY OF TROPONIN QUANT: CPT

## 2025-03-11 PROCEDURE — 6370000000 HC RX 637 (ALT 250 FOR IP): Performed by: STUDENT IN AN ORGANIZED HEALTH CARE EDUCATION/TRAINING PROGRAM

## 2025-03-11 PROCEDURE — 2700000000 HC OXYGEN THERAPY PER DAY

## 2025-03-11 PROCEDURE — 6360000002 HC RX W HCPCS: Performed by: INTERNAL MEDICINE

## 2025-03-11 PROCEDURE — 6360000002 HC RX W HCPCS: Performed by: HOSPITALIST

## 2025-03-11 PROCEDURE — 2060000000 HC ICU INTERMEDIATE R&B

## 2025-03-11 PROCEDURE — 94761 N-INVAS EAR/PLS OXIMETRY MLT: CPT

## 2025-03-11 PROCEDURE — 85025 COMPLETE CBC W/AUTO DIFF WBC: CPT

## 2025-03-11 PROCEDURE — 36415 COLL VENOUS BLD VENIPUNCTURE: CPT

## 2025-03-11 PROCEDURE — 2580000003 HC RX 258: Performed by: NURSE PRACTITIONER

## 2025-03-11 RX ORDER — FUROSEMIDE 10 MG/ML
40 INJECTION INTRAMUSCULAR; INTRAVENOUS ONCE
Status: COMPLETED | OUTPATIENT
Start: 2025-03-11 | End: 2025-03-11

## 2025-03-11 RX ORDER — MAGNESIUM SULFATE IN WATER 40 MG/ML
2000 INJECTION, SOLUTION INTRAVENOUS ONCE
Status: COMPLETED | OUTPATIENT
Start: 2025-03-11 | End: 2025-03-11

## 2025-03-11 RX ORDER — MAGNESIUM SULFATE IN WATER 40 MG/ML
2000 INJECTION, SOLUTION INTRAVENOUS ONCE
Status: DISCONTINUED | OUTPATIENT
Start: 2025-03-11 | End: 2025-03-14 | Stop reason: HOSPADM

## 2025-03-11 RX ADMIN — METOPROLOL SUCCINATE 50 MG: 50 TABLET, FILM COATED, EXTENDED RELEASE ORAL at 08:57

## 2025-03-11 RX ADMIN — SACUBITRIL AND VALSARTAN 0.5 TABLET: 24; 26 TABLET, FILM COATED ORAL at 21:47

## 2025-03-11 RX ADMIN — ALBUTEROL SULFATE 2.5 MG: 2.5 SOLUTION RESPIRATORY (INHALATION) at 19:13

## 2025-03-11 RX ADMIN — FUROSEMIDE 20 MG: 20 TABLET ORAL at 08:57

## 2025-03-11 RX ADMIN — DOXYCYCLINE HYCLATE 100 MG: 100 TABLET, FILM COATED ORAL at 21:48

## 2025-03-11 RX ADMIN — BUDESONIDE 500 MCG: 0.5 SUSPENSION RESPIRATORY (INHALATION) at 19:11

## 2025-03-11 RX ADMIN — MAGNESIUM SULFATE HEPTAHYDRATE 2000 MG: 40 INJECTION, SOLUTION INTRAVENOUS at 19:40

## 2025-03-11 RX ADMIN — SODIUM CHLORIDE, PRESERVATIVE FREE 10 ML: 5 INJECTION INTRAVENOUS at 20:23

## 2025-03-11 RX ADMIN — BUDESONIDE 500 MCG: 0.5 SUSPENSION RESPIRATORY (INHALATION) at 07:14

## 2025-03-11 RX ADMIN — SODIUM CHLORIDE, PRESERVATIVE FREE 10 ML: 5 INJECTION INTRAVENOUS at 08:58

## 2025-03-11 RX ADMIN — RANOLAZINE 500 MG: 500 TABLET, FILM COATED, EXTENDED RELEASE ORAL at 08:57

## 2025-03-11 RX ADMIN — SODIUM CHLORIDE 25 ML: 0.9 INJECTION, SOLUTION INTRAVENOUS at 19:40

## 2025-03-11 RX ADMIN — AMIODARONE HYDROCHLORIDE 200 MG: 200 TABLET ORAL at 08:57

## 2025-03-11 RX ADMIN — DOXYCYCLINE HYCLATE 100 MG: 100 TABLET, FILM COATED ORAL at 08:57

## 2025-03-11 RX ADMIN — PAROXETINE HYDROCHLORIDE 20 MG: 20 TABLET, FILM COATED ORAL at 08:57

## 2025-03-11 RX ADMIN — INSULIN LISPRO 8 UNITS: 100 INJECTION, SOLUTION INTRAVENOUS; SUBCUTANEOUS at 22:06

## 2025-03-11 RX ADMIN — INSULIN LISPRO 4 UNITS: 100 INJECTION, SOLUTION INTRAVENOUS; SUBCUTANEOUS at 08:57

## 2025-03-11 RX ADMIN — PREDNISONE 40 MG: 20 TABLET ORAL at 08:57

## 2025-03-11 RX ADMIN — RANOLAZINE 500 MG: 500 TABLET, FILM COATED, EXTENDED RELEASE ORAL at 21:48

## 2025-03-11 RX ADMIN — FUROSEMIDE 40 MG: 10 INJECTION, SOLUTION INTRAMUSCULAR; INTRAVENOUS at 20:22

## 2025-03-11 RX ADMIN — AMIODARONE HYDROCHLORIDE 200 MG: 200 TABLET ORAL at 21:47

## 2025-03-11 RX ADMIN — LORAZEPAM 1 MG: 1 TABLET ORAL at 09:03

## 2025-03-11 RX ADMIN — LORAZEPAM 1 MG: 1 TABLET ORAL at 16:48

## 2025-03-11 RX ADMIN — IPRATROPIUM BROMIDE 2 SPRAY: 21 SPRAY NASAL at 08:57

## 2025-03-11 ASSESSMENT — PAIN SCALES - GENERAL
PAINLEVEL_OUTOF10: 0
PAINLEVEL_OUTOF10: 0

## 2025-03-11 NOTE — PROGRESS NOTES
03/11/25 1931   NIV Type   NIV Started/Stopped On   Equipment Type v60   Mode CPAP   Mask Type Full face mask   Mask Size Large   Assessment   Pulse 94   Respirations 25   SpO2 99 %   Settings/Measurements   PIP Observed 16 cm H20   CPAP/EPAP 16 cmH2O   Vt (Measured) 856 mL   FiO2  100 %   Minute Volume (L/min) 24.5 Liters   Mask Leak (lpm) 64 lpm   Patient's Home Machine No   Alarm Settings   Alarms On Y   Low Pressure (cmH2O) 6 cmH2O   High Pressure (cmH2O) 30 cmH2O   Delay Alarm 20 sec(s)   RR Low (bpm) 6   RR High (bpm) 40 br/min

## 2025-03-11 NOTE — PROGRESS NOTES
[facility] Medicine Progress Note  V 1.6      Date of Admission: 3/7/2025    Hospital Day: 5      Chief Admission Complaint:  SOB    Subjective:  Patient seen at bedside this morning. Patient no acute concerns. Patient no fevers, chills, chest pain, sob, hypoxic events.    Presenting Admission History:       Dinesh Partida is a/an 59 y.o. male with a significant past medical history of hypertension, hyperlipidemia, CAD, PAF, HFrEF, SINDY, and type 2 diabetes who presents to [facility]'s emergency department with complaint of 24 hours of shortness of breath that began sometime yesterday afternoon.  He notes that he has intermittent dyspnea, does not require supplemental O2 at home except during sleep with his CPAP. He was hypoxic on arrival with an SpO2 of 85% on RA, requiring 2LNC in the ED. Denies any particular symptoms aside from dyspnea, no palpitations, no chest pain, no cough no chest congestion, no weight gain, no BLE edema.  He admits this happened about a month ago after having dyspnea for a week he was admitted.  However this time, denies any other symptoms.  The dyspnea is noted mostly with exertion, but sometimes feels it at rest as well.  His evaluation here included laboratory studies, EKG, chest x-ray, and CT PE protocol.  CT of the chest did not show any acute PE, there are persistent but improving infiltrates bilaterally, cardiomegaly and emphysema noted.  Laboratory studies reviewed and pertinent for creatinine 1.8, glucose 1 7, proBNP 650, troponin 20 with repeat of 16, ALT 1 6, AST 57, WBC 7.6.  Flu and COVID testing were negative.  VBG showed pH 7.30, pCO2 46, pO2 34, HCO3 22.  Patient started on vancomycin and cefepime in ED along with a DuoNeb and Solu-Medrol 125mg IVP.  Hospital team consulted to admit.     Assessment/Plan:      Current Principal Problem:  Acute hypoxic respiratory failure (HCC)    Acute Hypoxic Respiratory Failure  COPD  - Admit to floor  - Acute hypoxia on  23 22   BUN 39* 44* 43*   CREATININE 1.8* 1.5* 1.5*   CALCIUM 9.8 9.6 9.5   MG 2.64* 2.52*  --    PHOS 4.6 4.1  --      Recent Labs     03/09/25  1243   TROPHS 15     No results for input(s): \"LABA1C\" in the last 72 hours.  Recent Labs     03/09/25  0649   AST 59*   *   BILIDIR 0.2   BILITOT 0.3   ALKPHOS 84     No results for input(s): \"INR\", \"LACTA\", \"TSH\" in the last 72 hours.    Urine Cultures: No results found for: \"LABURIN\"  Blood Cultures:   Lab Results   Component Value Date/Time    BC No Growth after 4 days of incubation. 02/02/2025 11:14 PM     Lab Results   Component Value Date/Time    BLOODCULT2 No Growth after 4 days of incubation. 02/02/2025 11:14 PM     Organism: No results found for: \"ORG\"      Sergio Macias MD

## 2025-03-11 NOTE — PROGRESS NOTES
Patient: Dinesh Partida MRN: [id number]  Date of  Admission: 3/7/2025   YOB: 1965  Age: 59 y.o.  Sex: male    Unit: [address] SURG  Room/Bed: 0358/0358-01 Admitting Physician: KHADIJAH SAN    Attending Physician:  Sergio Macias MD         Pulmonary Service Note      SUBJECTIVE:    Patient no complaints.  He has not coughed up any blood.        OBJECTIVE    Medications    Continuous Infusions:   dextrose      sodium chloride         Scheduled Meds:   sacubitril-valsartan  0.5 tablet Oral BID    apixaban  5 mg Oral BID    amiodarone  200 mg Oral BID    ranolazine  500 mg Oral BID    predniSONE  40 mg Oral Daily    furosemide  20 mg Oral Daily    azelastine  1 spray Each Nostril BID    metoprolol succinate  50 mg Oral Daily    montelukast  10 mg Oral Nightly    PARoxetine  20 mg Oral Daily    sodium chloride flush  5-40 mL IntraVENous 2 times per day    doxycycline hyclate  100 mg Oral 2 times per day    insulin lispro  0-16 Units SubCUTAneous 4x Daily AC & HS    ipratropium  2 spray Each Nostril BID    budesonide  0.5 mg Nebulization BID RT       PRN Meds:  nitroGLYCERIN, fluticasone, LORazepam, glucose, dextrose bolus **OR** dextrose bolus, glucagon (rDNA), dextrose, sodium chloride flush, sodium chloride, polyethylene glycol, acetaminophen **OR** acetaminophen, prochlorperazine, albuterol    Physical    Patient Vitals for the past 24 hrs:   BP Temp Temp src Pulse Resp SpO2 Weight   03/11/25 1200 132/82 99 °F (37.2 °C) Oral 70 16 94 % --   03/11/25 0904 -- -- -- -- -- 94 % --   03/11/25 0857 (!) 140/76 -- -- 70 -- -- --   03/11/25 0805 135/74 98.1 °F (36.7 °C) Axillary 71 18 96 % --   03/11/25 0716 -- -- -- -- -- 95 % --   03/11/25 0451 -- -- -- -- -- -- (!) 137.3 kg (302 lb 11.2 oz)   03/11/25 0442 110/69 97.9 °F (36.6 °C) Axillary 71 16 98 % --   03/11/25 0321 -- -- -- -- 23 99 % --   03/11/25 0020 124/61 97.9 °F (36.6 °C) Axillary 70 15 99 % --   03/10/25 2335 -- -- -- -- 19 99 %

## 2025-03-11 NOTE — PLAN OF CARE
CHF Care Plan      Patient's EF (Ejection Fraction) is less than 40%    Heart Failure Medications:  Diuretics:: Furosemide    (One of the following REQUIRED for EF </= 40%/SYSTOLIC FAILURE but MAY be used in EF% >40%/DIASTOLIC FAILURE)        ACE:: None        ARB:: None         ARNI:: None    (Beta Blockers)  NON- Evidenced Based Beta Blocker (for EF% >40%/DIASTOLIC FAILURE): None    Evidenced Based Beta Blocker::(REQUIRED for EF% <40%/SYSTOLIC FAILURE) Metoprolol SUCCinate- Toprol XL  ...................................................................................................................................................    Failed to redirect to the Timeline version of the [employer] SmartLink.      Patient's weights and intake/output reviewed    Daily Weight log at bedside, patient/family participation in use of log: \"yes    Patient's current weight today shows a difference of 0.7 lbs less than last documented weight.      Intake/Output Summary (Last 24 hours) at 3/11/2025 0523  Last data filed at 3/11/2025 0321  Gross per 24 hour   Intake 1385 ml   Output 4225 ml   Net -2840 ml       Education Booklet Provided: yes    Comorbidities Reviewed Yes    Patient has a past medical history of Asthma, CAD (coronary artery disease), CHF (congestive heart failure) ([facility]), Hyperlipidemia, Hypertension, SINDY (obstructive sleep apnea), PAF (paroxysmal atrial fibrillation) (HCC), and Type 2 diabetes mellitus (HCC).     >>For CHF and Comorbidity documentation on Education Time and Topics, please see Education Tab      CHF Education    Learners:  Patient  Readineess:   Acceptance  Method:   Explanation  Response:   Verbalizes Understanding  Comments:     Time Spent: 5 minutes      Pt resting in bed at this time on CPAP. Pt denies shortness of breath. Pt with nonpitting lower extremity edema.     Patient and/or Family's stated Goal of Care this Admission: reduce shortness of breath, increase activity tolerance, better

## 2025-03-11 NOTE — PLAN OF CARE
Problem: Chronic Conditions and Co-morbidities  Goal: Patient's chronic conditions and co-morbidity symptoms are monitored and maintained or improved  3/10/2025 2244 by Karla Tello RN  Outcome: Progressing  3/10/2025 0944 by Bernarda oWod RN  Outcome: Progressing     Problem: Discharge Planning  Goal: Discharge to home or other facility with appropriate resources  3/10/2025 2244 by Karla Tello RN  Outcome: Progressing  3/10/2025 0944 by Bernarda Wood RN  Outcome: Progressing     Problem: Pain  Goal: Verbalizes/displays adequate comfort level or baseline comfort level  3/10/2025 2244 by Karla Tello RN  Outcome: Progressing  3/10/2025 0944 by Bernarda Wood RN  Outcome: Progressing     Problem: Safety - Adult  Goal: Free from fall injury  3/10/2025 2244 by Karla Tello RN  Outcome: Progressing  3/10/2025 0944 by Bernarda Wood RN  Outcome: Progressing     Problem: ABCDS Injury Assessment  Goal: Absence of physical injury  3/10/2025 2244 by Karla Tello RN  Outcome: Progressing  3/10/2025 0944 by Bernarda Wood RN  Outcome: Progressing     Problem: Anxiety  Goal: Will report anxiety at manageable levels  Description: INTERVENTIONS:  1. Administer medication as ordered  2. Teach and rehearse alternative coping skills  3. Provide emotional support with 1:1 interaction with staff  3/10/2025 2244 by Karla Tello RN  Outcome: Progressing  3/10/2025 0944 by Bernarda Wood RN  Outcome: Progressing     Problem: Metabolic/Fluid and Electrolytes - Adult  Goal: Glucose maintained within prescribed range  3/10/2025 2244 by Karla Tello RN  Outcome: Progressing  3/10/2025 0944 by Bernarda Wood RN  Outcome: Progressing

## 2025-03-11 NOTE — PROGRESS NOTES
03/11/25 0321   NIV Type   NIV Started/Stopped On   Equipment Type V60   Mode CPAP   Mask Type Full face mask   Mask Size Large   Assessment   Respirations 23   SpO2 99 %   Comfort Level Good   Using Accessory Muscles No   Mask Compliance Good   Skin Assessment Clean, dry, & intact   Skin Protection for O2 Device No   Settings/Measurements   CPAP/EPAP 16 cmH2O   Vt (Measured) 524 mL   FiO2  40 %   Minute Volume (L/min) 12 Liters   Mask Leak (lpm) 21 lpm   Patient's Home Machine No   Alarm Settings   Alarms On Y

## 2025-03-11 NOTE — CARE COORDINATION
Chart review day 3- from home w/SO. Plan return. Denies need for home care. Following for possible home O2. ACP completed with [facility]. Cm will continue to follow for DCP needs.

## 2025-03-11 NOTE — PLAN OF CARE
CHF Care Plan      Patient's EF (Ejection Fraction) is less than 40%    Heart Failure Medications:  Diuretics:: Furosemide    (One of the following REQUIRED for EF </= 40%/SYSTOLIC FAILURE but MAY be used in EF% >40%/DIASTOLIC FAILURE)        ACE:: None        ARB:: None         ARNI:: Sacubitril/Valsartan-Entresto    (Beta Blockers)  NON- Evidenced Based Beta Blocker (for EF% >40%/DIASTOLIC FAILURE): None    Evidenced Based Beta Blocker::(REQUIRED for EF% <40%/SYSTOLIC FAILURE) Metoprolol SUCCinate- Toprol XL  ...................................................................................................................................................    Failed to redirect to the Timeline version of the [employer] SmartLink.      Patient's weights and intake/output reviewed    Daily Weight log at bedside, patient/family participation in use of log: \"yes    Patient's current weight today shows a difference of 0.5 lbs more than last documented weight.      Intake/Output Summary (Last 24 hours) at 3/11/2025 1126  Last data filed at 3/11/2025 1022  Gross per 24 hour   Intake 1385 ml   Output 3325 ml   Net -1940 ml       Education Booklet Provided: yes    Comorbidities Reviewed Yes    Patient has a past medical history of Asthma, CAD (coronary artery disease), CHF (congestive heart failure) (HCC), Hyperlipidemia, Hypertension, SINDY (obstructive sleep apnea), PAF (paroxysmal atrial fibrillation) (HCC), and Type 2 diabetes mellitus (HCC).     >>For CHF and Comorbidity documentation on Education Time and Topics, please see Education Tab      CHF Education    Learners:  Patient  Readineess:   Acceptance  Method:   Explanation  Response:   Verbalizes Understanding      Time Spent: 5 minutes      Pt resting in bed at this time on room air. Pt denies shortness of breath. Pt with nonpitting lower extremity edema.     Patient and/or Family's stated Goal of Care this Admission: increase activity tolerance, better understand heart

## 2025-03-11 NOTE — PLAN OF CARE
Problem: Chronic Conditions and Co-morbidities  Goal: Patient's chronic conditions and co-morbidity symptoms are monitored and maintained or improved  3/11/2025 1124 by Zee Jurado RN  Outcome: Progressing  3/10/2025 2244 by Karla Tello RN  Outcome: Progressing     Problem: Discharge Planning  Goal: Discharge to home or other facility with appropriate resources  3/11/2025 1124 by Zee Jurado RN  Outcome: Progressing  3/10/2025 2244 by Karla Tello RN  Outcome: Progressing     Problem: Pain  Goal: Verbalizes/displays adequate comfort level or baseline comfort level  3/11/2025 1124 by Zee Jurado RN  Outcome: Progressing  3/10/2025 2244 by Karla Tello RN  Outcome: Progressing     Problem: Safety - Adult  Goal: Free from fall injury  3/11/2025 1124 by Zee Jurado RN  Outcome: Progressing  3/10/2025 2244 by Karla Tello RN  Outcome: Progressing     Problem: ABCDS Injury Assessment  Goal: Absence of physical injury  3/11/2025 1124 by Zee Jurado RN  Outcome: Progressing  3/10/2025 2244 by Karla Tello RN  Outcome: Progressing     Problem: Respiratory - Adult  Goal: Achieves optimal ventilation and oxygenation  Outcome: Progressing     Problem: Cardiovascular - Adult  Goal: Maintains optimal cardiac output and hemodynamic stability  3/11/2025 1124 by Zee Jurado RN  Outcome: Progressing  3/10/2025 2244 by Karla Tello RN  Outcome: Progressing  Goal: Absence of cardiac dysrhythmias or at baseline  Outcome: Progressing     Problem: Skin/Tissue Integrity - Adult  Goal: Skin integrity remains intact  3/11/2025 1124 by Zee Jurado RN  Outcome: Progressing  3/10/2025 2244 by Karla Tello RN  Outcome: Progressing  Goal: Incisions, wounds, or drain sites healing without S/S of infection  Outcome: Progressing  Goal: Oral mucous membranes remain intact  Outcome: Progressing     Problem: Musculoskeletal - Adult  Goal: Return mobility to safest level of function  Outcome:

## 2025-03-11 NOTE — PROGRESS NOTES
03/10/25 2335   NIV Type   NIV Started/Stopped On   Equipment Type V60   Mode CPAP   Mask Type Full face mask   Mask Size Large   Assessment   Respirations 19   SpO2 99 %   Comfort Level Good   Using Accessory Muscles No   Mask Compliance Good   Skin Protection for O2 Device No   Breath Sounds   Respiratory Pattern Regular   Settings/Measurements   CPAP/EPAP 16 cmH2O   Vt (Measured) 572 mL   FiO2  40 %   Minute Volume (L/min) 11 Liters   Mask Leak (lpm) 20 lpm   Patient's Home Machine No   Alarm Settings   Alarms On Y

## 2025-03-11 NOTE — PROGRESS NOTES
[facility]   Daily Progress Note      Admit Date:  3/7/2025    Reason for follow up visit: SOB; hemoptysis    CC: \"I'm feeling better. My cough is improving. I haven't coughed up any more blood.\"     59 y.o. man with severe two vessel CAD, AFIB, ICD (CRT-D), severe chronic HFrEF (EF 20-25%), COPD, SINDY, CKD, HTN, and HLP admitted for SOB.  Per chart notes, patient presented hypoxic in the 80s and reports hemoptysis.  Of note, he was recently admitted for multifocal PNA three weeks ago.  Patient is followed by cardiology providers (Dr Melgar and Dr Singh)e at [facility].  He presented to ED with chest pain and mild hemoptysis. His K+ was elevated and meds adjusted. Cr increased and nephrology following. Noted to have elevated LFT's and amiodarone adjusted.    Interval History:  Pt. seen and examined; records reviewed  BP stable. Remains on room air  Cough improving and no further hemoptysis  Denies chest pain, palpitations or dizziness  + SOB improving  No LE edema noted  Concerned and voicing frustrations regarding his medications and changes that are occurring.    Subjective:  Pt with no acute overnight cardiac events.     Objective:   /82   Pulse 70   Temp 99 °F (37.2 °C) (Oral)   Resp 16   Ht 1.778 m (5' 10\")   Wt (!) 137.3 kg (302 lb 11.2 oz)   SpO2 94%   BMI 43.43 kg/m²     Intake/Output Summary (Last 24 hours) at 3/11/2025 1416  Last data filed at 3/11/2025 1022  Gross per 24 hour   Intake 1085 ml   Output 2225 ml   Net -1140 ml     Wt Readings from Last 3 Encounters:   03/11/25 (!) 137.3 kg (302 lb 11.2 oz)   02/07/25 133.4 kg (294 lb 1.6 oz)   10/02/24 129.5 kg (285 lb 6.4 oz)       Physical Exam:  General: In no acute distress. Awake, alert, and oriented X4. Resting in bed  Skin:  Warm and dry.    Neck:  Supple and thick. No JVD appreciated.  Chest:  Lungs with diminished breath sounds bilaterally  Cardiovascular:  RRR. Normal S1 and S2. No murmur/gallop or rub   Abdomen:  soft,  Emphysema  -Rx per pulmonary    8) KERRY on CKD  -Cr 1.5 at present  -Entresto resumed at lower dose    9) Essential Hypertension  -BP Stable  -controlled  -continue medical management    10) Coronary artery disease  -cardiac cath in 2023: 2 vessel disease involving LCX and RCA with collaterals  -no angina  -elevated troponin on admit (demand ischemia)  -consider ischemic evaluation as an outpat once recovered from his PNA  -defer to primary cardiologist at [facility]  -continue med management with Toprol and Ranexa  -not on statin d/t elevated LFT's    Will follow more peripherally from cardiac standpoint. Recommend he follow up with his primary cardiologist.  Advised to follow up with EP at [facility] in regards to timing of his ablation.    Continue Amiodarone 200 mg BID, Eliquis 5 mg BID, Lasix 20 mg daily, Toprol Xl 50 mg daily, Ranexa 500 mg BID and Entresto 24/26 mg tablet: would increase to 1 tablet twice a day at discharge with follow up BMP in 5-7 days.    Thank you for allowing us to participate in Mr. Jaquan myles.    Electronically signed by DIANE M ENZWEILER, APRN - CNP on 3/11/2025 at 2:16 PM

## 2025-03-11 NOTE — PROGRESS NOTES
[facility] ([facility])   Nephrology Note  [id number] / [phone] / [phone]   [email or web address]      Subjective:   Dinesh Partida: 59 y.o.  male who we are seeing in consultation for CKD.    SUBJECTIVE:    - The patient was seen and examined; he feels fine today with no CP, +SOB, no nausea or vomiting. +cough   - ROS: No fever or chills.  - Social: no family present     Physical Exam:    VITALS:  BP (!) 140/76   Pulse 70   Temp 98.1 °F (36.7 °C) (Axillary)   Resp 18   Ht 1.778 m (5' 10\")   Wt (!) 137.3 kg (302 lb 11.2 oz)   SpO2 94%   BMI 43.43 kg/m²     General appearance: Seems comfortable, no acute distress.  Neck: Trachea midline, thyroid normal.   Lungs:  Non labored breathing, CTA to anterior auscultation.  Heart:  S1S2 normal, rub or gallop. No peripheral edema.  Abdomen: Soft, non-tender, no organomegaly.   Skin: No lesions or rashes, warm to touch.     DATA:    CBC with Differential:    Lab Results   Component Value Date/Time    WBC 8.4 03/11/2025 05:58 AM    RBC 5.49 03/11/2025 05:58 AM    HGB 16.2 03/11/2025 05:58 AM    HCT 48.8 03/11/2025 05:58 AM     03/11/2025 05:58 AM    MCV 88.9 03/11/2025 05:58 AM    MCH 29.5 03/11/2025 05:58 AM    MCHC 33.2 03/11/2025 05:58 AM    RDW 17.3 03/11/2025 05:58 AM    LYMPHOPCT 17.3 03/11/2025 05:58 AM    MONOPCT 7.2 03/11/2025 05:58 AM    MYELOPCT 2 02/02/2025 09:53 PM    EOSPCT 0.1 03/11/2025 05:58 AM    BASOPCT 0.3 03/11/2025 05:58 AM    MONOSABS 0.6 03/11/2025 05:58 AM    LYMPHSABS 1.5 03/11/2025 05:58 AM    EOSABS 0.0 03/11/2025 05:58 AM    BASOSABS 0.0 03/11/2025 05:58 AM     BMP:    Lab Results   Component Value Date/Time     03/11/2025 05:58 AM    K 4.9 03/11/2025 05:58 AM    K 4.5 03/07/2025 09:41 PM     03/11/2025 05:58 AM    CO2 22 03/11/2025 05:58 AM    BUN 43 03/11/2025 05:58 AM    CREATININE 1.5 03/11/2025 05:58 AM    CALCIUM 9.5 03/11/2025 05:58 AM    LABGLOM 52 03/11/2025 05:58 AM    GLUCOSE 192

## 2025-03-11 NOTE — PROGRESS NOTES
[facility]. Patient appeared diaphoretic and SOB. RR called.

## 2025-03-12 LAB
ACANTHOCYTES BLD QL SMEAR: ABNORMAL
ANION GAP SERPL CALCULATED.3IONS-SCNC: 13 MMOL/L (ref 3–16)
BASOPHILS # BLD: 0 K/UL (ref 0–0.2)
BASOPHILS NFR BLD: 0 %
BUN SERPL-MCNC: 48 MG/DL (ref 7–20)
CALCIUM SERPL-MCNC: 9.4 MG/DL (ref 8.3–10.6)
CHLORIDE SERPL-SCNC: 98 MMOL/L (ref 99–110)
CO2 SERPL-SCNC: 23 MMOL/L (ref 21–32)
CREAT SERPL-MCNC: 1.8 MG/DL (ref 0.8–1.3)
DACRYOCYTES BLD QL SMEAR: ABNORMAL
DEPRECATED RDW RBC AUTO: 16.9 % (ref 12.4–15.4)
EKG DIAGNOSIS: NORMAL
EKG Q-T INTERVAL: 386 MS
EKG QRS DURATION: 170 MS
EKG QTC CALCULATION (BAZETT): 505 MS
EKG R AXIS: 118 DEGREES
EKG T AXIS: -37 DEGREES
EKG VENTRICULAR RATE: 103 BPM
EOSINOPHIL # BLD: 0 K/UL (ref 0–0.6)
EOSINOPHIL NFR BLD: 0 %
GFR SERPLBLD CREATININE-BSD FMLA CKD-EPI: 42 ML/MIN/{1.73_M2}
GLUCOSE BLD-MCNC: 168 MG/DL (ref 70–99)
GLUCOSE BLD-MCNC: 194 MG/DL (ref 70–99)
GLUCOSE BLD-MCNC: 201 MG/DL (ref 70–99)
GLUCOSE BLD-MCNC: 206 MG/DL (ref 70–99)
GLUCOSE SERPL-MCNC: 203 MG/DL (ref 70–99)
HCT VFR BLD AUTO: 50.4 % (ref 40.5–52.5)
HGB BLD-MCNC: 16.9 G/DL (ref 13.5–17.5)
LYMPHOCYTES # BLD: 1.9 K/UL (ref 1–5.1)
LYMPHOCYTES NFR BLD: 20 %
MAGNESIUM SERPL-MCNC: 2.45 MG/DL (ref 1.8–2.4)
MCH RBC QN AUTO: 29.6 PG (ref 26–34)
MCHC RBC AUTO-ENTMCNC: 33.6 G/DL (ref 31–36)
MCV RBC AUTO: 88.2 FL (ref 80–100)
METAMYELOCYTES NFR BLD MANUAL: 2 %
MONOCYTES # BLD: 0.3 K/UL (ref 0–1.3)
MONOCYTES NFR BLD: 3 %
NEUTROPHILS # BLD: 7.2 K/UL (ref 1.7–7.7)
NEUTROPHILS NFR BLD: 75 %
OVALOCYTES BLD QL SMEAR: ABNORMAL
PERFORMED ON: ABNORMAL
PHOSPHATE SERPL-MCNC: 4.4 MG/DL (ref 2.5–4.9)
PLATELET # BLD AUTO: 215 K/UL (ref 135–450)
PLATELET BLD QL SMEAR: ADEQUATE
PMV BLD AUTO: 7.8 FL (ref 5–10.5)
POTASSIUM SERPL-SCNC: 4.4 MMOL/L (ref 3.5–5.1)
RBC # BLD AUTO: 5.72 M/UL (ref 4.2–5.9)
SLIDE REVIEW: ABNORMAL
SODIUM SERPL-SCNC: 134 MMOL/L (ref 136–145)
WBC # BLD AUTO: 9.4 K/UL (ref 4–11)

## 2025-03-12 PROCEDURE — 94640 AIRWAY INHALATION TREATMENT: CPT

## 2025-03-12 PROCEDURE — 2500000003 HC RX 250 WO HCPCS: Performed by: NURSE PRACTITIONER

## 2025-03-12 PROCEDURE — 84100 ASSAY OF PHOSPHORUS: CPT

## 2025-03-12 PROCEDURE — 6360000002 HC RX W HCPCS: Performed by: INTERNAL MEDICINE

## 2025-03-12 PROCEDURE — 6370000000 HC RX 637 (ALT 250 FOR IP)

## 2025-03-12 PROCEDURE — 83735 ASSAY OF MAGNESIUM: CPT

## 2025-03-12 PROCEDURE — 6370000000 HC RX 637 (ALT 250 FOR IP): Performed by: INTERNAL MEDICINE

## 2025-03-12 PROCEDURE — 80048 BASIC METABOLIC PNL TOTAL CA: CPT

## 2025-03-12 PROCEDURE — 36415 COLL VENOUS BLD VENIPUNCTURE: CPT

## 2025-03-12 PROCEDURE — 94660 CPAP INITIATION&MGMT: CPT

## 2025-03-12 PROCEDURE — 2700000000 HC OXYGEN THERAPY PER DAY

## 2025-03-12 PROCEDURE — 2060000000 HC ICU INTERMEDIATE R&B

## 2025-03-12 PROCEDURE — 99232 SBSQ HOSP IP/OBS MODERATE 35: CPT | Performed by: STUDENT IN AN ORGANIZED HEALTH CARE EDUCATION/TRAINING PROGRAM

## 2025-03-12 PROCEDURE — 93010 ELECTROCARDIOGRAM REPORT: CPT | Performed by: INTERNAL MEDICINE

## 2025-03-12 PROCEDURE — 6370000000 HC RX 637 (ALT 250 FOR IP): Performed by: STUDENT IN AN ORGANIZED HEALTH CARE EDUCATION/TRAINING PROGRAM

## 2025-03-12 PROCEDURE — 85025 COMPLETE CBC W/AUTO DIFF WBC: CPT

## 2025-03-12 PROCEDURE — 94761 N-INVAS EAR/PLS OXIMETRY MLT: CPT

## 2025-03-12 PROCEDURE — 6370000000 HC RX 637 (ALT 250 FOR IP): Performed by: NURSE PRACTITIONER

## 2025-03-12 RX ORDER — TRANEXAMIC ACID 100 MG/ML
500 INJECTION, SOLUTION INTRAVENOUS 2 TIMES DAILY
Status: ACTIVE | OUTPATIENT
Start: 2025-03-12 | End: 2025-03-13

## 2025-03-12 RX ADMIN — RANOLAZINE 500 MG: 500 TABLET, FILM COATED, EXTENDED RELEASE ORAL at 20:50

## 2025-03-12 RX ADMIN — SODIUM CHLORIDE, PRESERVATIVE FREE 10 ML: 5 INJECTION INTRAVENOUS at 20:52

## 2025-03-12 RX ADMIN — FUROSEMIDE 20 MG: 20 TABLET ORAL at 10:09

## 2025-03-12 RX ADMIN — DOXYCYCLINE HYCLATE 100 MG: 100 TABLET, FILM COATED ORAL at 10:09

## 2025-03-12 RX ADMIN — INSULIN LISPRO 4 UNITS: 100 INJECTION, SOLUTION INTRAVENOUS; SUBCUTANEOUS at 20:50

## 2025-03-12 RX ADMIN — BUDESONIDE 500 MCG: 0.5 SUSPENSION RESPIRATORY (INHALATION) at 20:11

## 2025-03-12 RX ADMIN — RANOLAZINE 500 MG: 500 TABLET, FILM COATED, EXTENDED RELEASE ORAL at 10:09

## 2025-03-12 RX ADMIN — DOXYCYCLINE HYCLATE 100 MG: 100 TABLET, FILM COATED ORAL at 20:50

## 2025-03-12 RX ADMIN — PAROXETINE HYDROCHLORIDE 20 MG: 20 TABLET, FILM COATED ORAL at 10:09

## 2025-03-12 RX ADMIN — AMIODARONE HYDROCHLORIDE 200 MG: 200 TABLET ORAL at 20:49

## 2025-03-12 RX ADMIN — SODIUM CHLORIDE, PRESERVATIVE FREE 10 ML: 5 INJECTION INTRAVENOUS at 10:12

## 2025-03-12 RX ADMIN — AMIODARONE HYDROCHLORIDE 200 MG: 200 TABLET ORAL at 10:09

## 2025-03-12 RX ADMIN — BUDESONIDE 500 MCG: 0.5 SUSPENSION RESPIRATORY (INHALATION) at 12:13

## 2025-03-12 RX ADMIN — SACUBITRIL AND VALSARTAN 0.5 TABLET: 24; 26 TABLET, FILM COATED ORAL at 10:09

## 2025-03-12 RX ADMIN — IPRATROPIUM BROMIDE 2 SPRAY: 21 SPRAY NASAL at 20:52

## 2025-03-12 RX ADMIN — METOPROLOL SUCCINATE 50 MG: 50 TABLET, FILM COATED, EXTENDED RELEASE ORAL at 10:09

## 2025-03-12 RX ADMIN — PREDNISONE 40 MG: 20 TABLET ORAL at 10:09

## 2025-03-12 RX ADMIN — INSULIN LISPRO 4 UNITS: 100 INJECTION, SOLUTION INTRAVENOUS; SUBCUTANEOUS at 17:20

## 2025-03-12 RX ADMIN — SACUBITRIL AND VALSARTAN 0.5 TABLET: 24; 26 TABLET, FILM COATED ORAL at 20:49

## 2025-03-12 RX ADMIN — IPRATROPIUM BROMIDE 2 SPRAY: 21 SPRAY NASAL at 10:12

## 2025-03-12 RX ADMIN — INSULIN LISPRO 4 UNITS: 100 INJECTION, SOLUTION INTRAVENOUS; SUBCUTANEOUS at 13:20

## 2025-03-12 ASSESSMENT — PAIN SCALES - GENERAL
PAINLEVEL_OUTOF10: 0

## 2025-03-12 NOTE — PROGRESS NOTES
03/12/25 0325   NIV Type   $NIV $Daily Charge   NIV Started/Stopped On   Equipment Type v60   Mode CPAP   Mask Type Full face mask   Mask Size Large   Assessment   SpO2 96 %   Settings/Measurements   CPAP/EPAP 16 cmH2O   Vt (Measured) 633 mL   FiO2  35 %   Patient's Home Machine No   Alarm Settings   Alarms On Y   Low Pressure (cmH2O) 6 cmH2O   High Pressure (cmH2O) 30 cmH2O

## 2025-03-12 NOTE — PLAN OF CARE
Problem: Safety - Adult  Goal: Free from fall injury  3/12/2025 1346 by Sari Motley, RN  Outcome: Progressing

## 2025-03-12 NOTE — PLAN OF CARE
Problem: Chronic Conditions and Co-morbidities  Goal: Patient's chronic conditions and co-morbidity symptoms are monitored and maintained or improved  3/12/2025 0002 by Yasmine Dimas RN  Outcome: Progressing  Note: Patient with diagnosis of Diabetes. Active orders for ACHS glucose monitoring, SSI and Lantus. Instructed importance of following carb control diet to maintain stable gluocose levels.       Problem: Discharge Planning  Goal: Discharge to home or other facility with appropriate resources  3/12/2025 0002 by Yasmine Dimas RN  Outcome: Progressing     Problem: Pain  Goal: Verbalizes/displays adequate comfort level or baseline comfort level  3/12/2025 0002 by Yasmine Dimas RN  Outcome: Progressing     Problem: Safety - Adult  Goal: Free from fall injury  3/12/2025 0002 by Yasmine Dimas RN  Outcome: Progressing     Problem: Respiratory - Adult  Goal: Achieves optimal ventilation and oxygenation  3/12/2025 0002 by Yasmine Dimas RN  Outcome: Progressing     Problem: Skin/Tissue Integrity - Adult  Goal: Skin integrity remains intact  3/12/2025 0002 by Yasmine Dimas RN  Outcome: Progressing     Problem: Anxiety  Goal: Will report anxiety at manageable levels  Description: INTERVENTIONS:  1. Administer medication as ordered  2. Teach and rehearse alternative coping skills  3. Provide emotional support with 1:1 interaction with staff  3/12/2025 0002 by Yasmine Dimas RN  Outcome: Progressing   CHF Care Plan      Patient's EF (Ejection Fraction) is less than 40%    Heart Failure Medications:  Diuretics:: Furosemide    (One of the following REQUIRED for EF </= 40%/SYSTOLIC FAILURE but MAY be used in EF% >40%/DIASTOLIC FAILURE)        ACE:: None        ARB:: None         ARNI:: Sacubitril/Valsartan-Entresto    (Beta Blockers)  NON- Evidenced Based Beta Blocker (for EF% >40%/DIASTOLIC FAILURE): None    Evidenced Based Beta Blocker::(REQUIRED for EF% <40%/SYSTOLIC FAILURE) Metoprolol SUCCinate- Toprol

## 2025-03-12 NOTE — PROGRESS NOTES
Patient transferred to . [facility] notified. Patients belongings sent with patient. Report given to Yasmine AMSON.

## 2025-03-12 NOTE — PROGRESS NOTES
Patient called out with complaints of SOB. Was 85 on RA. Placed patient back on 1L NC as he was on 1L NC this AM. Patient still sustaining 85-88% on 1L with c/o SOB still. Turned patient to 6L NC.   Called respiratory requesting breathing tx.

## 2025-03-12 NOTE — PROGRESS NOTES
Patient: Dinesh Partida MRN: [id number]  Date of  Admission: 3/7/2025   YOB: 1965  Age: 59 y.o.  Sex: male    Unit: [facility] A2 CARD TELEMETRY  Room/Bed: [address] [place], [place] [address] Admitting Physician: KHADIJAH SAN    Attending Physician:  Sergio Macias MD         Pulmonary Service Note      SUBJECTIVE:    Patient says he has multiple episodes of blood-tinged sputum.  He did not take his blood thinner this morning.  He is also requiring oxygen        OBJECTIVE    Medications    Continuous Infusions:   dextrose      sodium chloride 25 mL (03/11/25 1940)       Scheduled Meds:   sacubitril-valsartan  0.5 tablet Oral BID    [Held by provider] apixaban  5 mg Oral BID    magnesium sulfate  2,000 mg IntraVENous Once    amiodarone  200 mg Oral BID    ranolazine  500 mg Oral BID    predniSONE  40 mg Oral Daily    furosemide  20 mg Oral Daily    azelastine  1 spray Each Nostril BID    metoprolol succinate  50 mg Oral Daily    montelukast  10 mg Oral Nightly    PARoxetine  20 mg Oral Daily    sodium chloride flush  5-40 mL IntraVENous 2 times per day    doxycycline hyclate  100 mg Oral 2 times per day    insulin lispro  0-16 Units SubCUTAneous 4x Daily AC & HS    ipratropium  2 spray Each Nostril BID    budesonide  0.5 mg Nebulization BID RT       PRN Meds:  nitroGLYCERIN, fluticasone, LORazepam, glucose, dextrose bolus **OR** dextrose bolus, glucagon (rDNA), dextrose, sodium chloride flush, sodium chloride, polyethylene glycol, acetaminophen **OR** acetaminophen, prochlorperazine, albuterol    Physical    Patient Vitals for the past 24 hrs:   BP Temp Temp src Pulse Resp SpO2   03/12/25 1231 (!) 145/82 97.3 °F (36.3 °C) Oral 72 (!) 71 93 %   03/12/25 1000 138/71 97.6 °F (36.4 °C) Oral 70 18 97 %   03/12/25 0854 -- -- -- -- -- 95 %   03/12/25 0520 121/74 97.2 °F (36.2 °C) Axillary 70 14 95 %   03/12/25 0325 -- -- -- -- -- 96 %   03/12/25 0031 (!) 121/58 97.5 °F (36.4 °C) Oral 70 20 94 %   03/11/25 3393 -- -- -- --  21 --   03/11/25 2144 (!) 140/77 -- -- 70 20 95 %   03/11/25 2000 (!) 151/95 98.1 °F (36.7 °C) Axillary 88 26 93 %   03/11/25 1931 -- -- -- 94 25 99 %   03/11/25 1930 (!) 154/94 -- -- 92 -- 99 %   03/11/25 1924 (!) 136/101 -- -- 95 -- 99 %   03/11/25 1921 (!) 136/101 97.7 °F (36.5 °C) Axillary (!) 102 (!) 36 96 %   03/11/25 1911 -- -- -- -- -- 91 %   03/11/25 1900 -- -- -- -- -- 90 %   03/11/25 1855 -- -- -- -- -- (!) 85 %   03/11/25 1850 -- -- -- -- -- 90 %   03/11/25 1842 -- -- -- -- -- (!) 87 %   03/11/25 1649 134/66 97.9 °F (36.6 °C) Oral 70 18 91 %        Physical Exam  Constitutional:       General: He is not in acute distress.     Appearance: He is obese. He is not toxic-appearing.   HENT:      Head: Normocephalic and atraumatic.      Nose: Nose normal.      Mouth/Throat:      Pharynx: No oropharyngeal exudate.   Eyes:      General: No scleral icterus.        Right eye: No discharge.         Left eye: No discharge.   Cardiovascular:      Rate and Rhythm: Normal rate and regular rhythm.      Heart sounds: No murmur heard.     No friction rub. No gallop.   Pulmonary:      Effort: Pulmonary effort is normal. No respiratory distress.      Breath sounds: No wheezing or rales.   Abdominal:      General: Abdomen is flat. Bowel sounds are normal.      Palpations: Abdomen is soft.   Musculoskeletal:         General: No swelling. Normal range of motion.      Cervical back: Normal range of motion.   Skin:     General: Skin is warm and dry.   Neurological:      General: No focal deficit present.      Mental Status: He is alert and oriented to person, place, and time.   Psychiatric:         Mood and Affect: Mood normal.              Weight  Weight change:       Labs:   Recent Labs     03/10/25  0543 03/11/25  0558 03/12/25  0420   WBC 12.1* 8.4 9.4   HGB 16.3 16.2 16.9   HCT 49.4 48.8 50.4    211 215      Recent Labs     03/11/25  0558 03/11/25  1944 03/12/25  0420    132* 134*   K 4.9 5.8* 4.4    97*

## 2025-03-12 NOTE — PROGRESS NOTES
[facility] ([facility])   Nephrology Note  [phone] / [phone] / [phone]   [email or web address]      Subjective:   Dinesh Partida: 59 y.o.  male who we are seeing in consultation for CKD.    SUBJECTIVE:    - The patient was seen and examined  - He reports recurrent hemoptysis again.  Chest pain event yesterday, RR, transferred floors.   - ROS: No fever or chills.  no CP, +SOB, no nausea or vomiting. +cough.  - Social: Discussed with family present.     Physical Exam:    VITALS:  /74   Pulse 70   Temp 97.2 °F (36.2 °C) (Axillary)   Resp 14   Ht 1.778 m (5' 10\")   Wt (!) 137.3 kg (302 lb 11.2 oz)   SpO2 95%   BMI 43.43 kg/m²     General appearance: Seems comfortable, no acute distress.  Neck: Trachea midline, thyroid normal.   Lungs:  Non labored breathing, CTA to anterior auscultation.  Heart:  S1S2 normal, rub or gallop. No peripheral edema.  Abdomen: Soft, non-tender, no organomegaly.  Obese   Skin: No lesions or rashes, warm to touch.     DATA:    CBC with Differential:    Lab Results   Component Value Date/Time    WBC 9.4 03/12/2025 04:20 AM    RBC 5.72 03/12/2025 04:20 AM    HGB 16.9 03/12/2025 04:20 AM    HCT 50.4 03/12/2025 04:20 AM     03/12/2025 04:20 AM    MCV 88.2 03/12/2025 04:20 AM    MCH 29.6 03/12/2025 04:20 AM    MCHC 33.6 03/12/2025 04:20 AM    RDW 16.9 03/12/2025 04:20 AM    METASPCT 2 03/12/2025 04:20 AM    LYMPHOPCT 20.0 03/12/2025 04:20 AM    MONOPCT 3.0 03/12/2025 04:20 AM    MYELOPCT 2 02/02/2025 09:53 PM    EOSPCT 0.0 03/12/2025 04:20 AM    BASOPCT 0.0 03/12/2025 04:20 AM    MONOSABS 0.3 03/12/2025 04:20 AM    LYMPHSABS 1.9 03/12/2025 04:20 AM    EOSABS 0.0 03/12/2025 04:20 AM    BASOSABS 0.0 03/12/2025 04:20 AM     BMP:    Lab Results   Component Value Date/Time     03/12/2025 04:20 AM    K 4.4 03/12/2025 04:20 AM    K 5.8 03/11/2025 07:44 PM    CL 98 03/12/2025 04:20 AM    CO2 23 03/12/2025 04:20 AM    BUN 48 03/12/2025 04:20 AM     CREATININE 1.8 03/12/2025 04:20 AM    CALCIUM 9.4 03/12/2025 04:20 AM    LABGLOM 42 03/12/2025 04:20 AM    GLUCOSE 203 03/12/2025 04:20 AM       IMPRESSION/RECOMMENDATIONS:      # CKD 3A-3B: The patient has chronic kidney disease with a baseline creatinine of 1.8 to 2 mg/dl. His urinary output is well maintained and his serum creatinine is currently at baseline, monitor.  No need for IVF.  - He follows with Dr Hoover as outpatient.  Needs to call for an appt.   - Restart Jardiance daily.   - Cr is a little worse today; but still near his chronic baseline.      # No significant electrolytes disorders noted.  - Potassium was hemolyzed overnight.      # HTN: Blood pressure within acceptable range.  Continue same meds.   - PTA Aldactone and Entresto are still held.  BP seems too low to restart these.   - Lasix 20mg daily.      # COPD exacerbation: Management per primary team.  - Needing 2L NC O2.   - Complains of hemoptysis: checked EMILY/ANCA/anti-GBM: were negative.   - Former smoker.     ALESSANDRO Manriquez MD  Office: [phone]  Fax:    [phone]  [place]

## 2025-03-12 NOTE — PROGRESS NOTES
[facility] Medicine Progress Note  V 1.6      Date of Admission: 3/7/2025    Hospital Day: 6      Chief Admission Complaint:  SOB    Subjective:  Patient seen at bedside this morning. Patient no acute concerns. Patient no fevers, chills, chest pain, sob, hypoxic events.    Presenting Admission History:       Dinesh Partida is a/an 59 y.o. male with a significant past medical history of hypertension, hyperlipidemia, CAD, PAF, HFrEF, SINDY, and type 2 diabetes who presents to [facility]'s emergency department with complaint of 24 hours of shortness of breath that began sometime yesterday afternoon.  He notes that he has intermittent dyspnea, does not require supplemental O2 at home except during sleep with his CPAP. He was hypoxic on arrival with an SpO2 of 85% on RA, requiring 2LNC in the ED. Denies any particular symptoms aside from dyspnea, no palpitations, no chest pain, no cough no chest congestion, no weight gain, no BLE edema.  He admits this happened about a month ago after having dyspnea for a week he was admitted.  However this time, denies any other symptoms.  The dyspnea is noted mostly with exertion, but sometimes feels it at rest as well.  His evaluation here included laboratory studies, EKG, chest x-ray, and CT PE protocol.  CT of the chest did not show any acute PE, there are persistent but improving infiltrates bilaterally, cardiomegaly and emphysema noted.  Laboratory studies reviewed and pertinent for creatinine 1.8, glucose 1 7, proBNP 650, troponin 20 with repeat of 16, ALT 1 6, AST 57, WBC 7.6.  Flu and COVID testing were negative.  VBG showed pH 7.30, pCO2 46, pO2 34, HCO3 22.  Patient started on vancomycin and cefepime in ED along with a DuoNeb and Solu-Medrol 125mg IVP.  Hospital team consulted to admit.     Assessment/Plan:      Current Principal Problem:  Acute hypoxic respiratory failure (HCC)    Acute Hypoxic Respiratory Failure  COPD  Hemoptysis  - Admit to floor  - Acute  montelukast  10 mg Oral Nightly    PARoxetine  20 mg Oral Daily    sodium chloride flush  5-40 mL IntraVENous 2 times per day    doxycycline hyclate  100 mg Oral 2 times per day    insulin lispro  0-16 Units SubCUTAneous 4x Daily AC & HS    ipratropium  2 spray Each Nostril BID    budesonide  0.5 mg Nebulization BID RT     PRN Meds: nitroGLYCERIN, fluticasone, LORazepam, glucose, dextrose bolus **OR** dextrose bolus, glucagon (rDNA), dextrose, sodium chloride flush, sodium chloride, polyethylene glycol, acetaminophen **OR** acetaminophen, prochlorperazine, albuterol      Physical Exam Performed:      General appearance:  No apparent distress  Respiratory:  Normal respiratory effort.   Cardiovascular:  Regular rate and rhythm.  Abdomen:  Soft, non-tender, non-distended.  Musculoskeletal:  No edema  Neurologic:  Non-focal  Psychiatric:  Alert and oriented    /71   Pulse 70   Temp 97.6 °F (36.4 °C) (Oral)   Resp 18   Ht 1.778 m (5' 10\")   Wt (!) 137.3 kg (302 lb 11.2 oz)   SpO2 97%   BMI 43.43 kg/m²     Telemetry:      Personally reviewed and interpreted telemetry (Rhythm Strip) on 3/12/2025 with the following findings: Unremarkable    Diet: ADULT DIET; Regular; 4 carb choices (60 gm/meal); Low Fat/Low Chol/High Fiber/CHANNING; 1500 ml    DVT Prophylaxis: []PPx LMWH  []SQ Heparin  [x]IPC/SCDs  []Eliquis  []Xarelto  []Coumadin  [] Heparin Drip  []Other -      Code status: Full Code    PT/OT Eval Status:   []NOT yet ordered  []Ordered and Pending   [x]Seen with Recommendations for:   [x]Home independently  []Home w/ assist  []HHC  []SNF  []Acute Rehab    Multi-Disciplinary Rounds with Case Management completed on 3/12/2025 with the following recommendations:  Anticipated Discharge Location: [x]Home w/ []HHC vs []SNF  []Acute Rehab  []LTAC  []Hospice  []Other -    Anticipated Discharge Day/Date:  3/10/25  [facility] to Discharge: [facility]  --------------------------------------------------    MDM (any 2

## 2025-03-12 NOTE — PROGRESS NOTES
03/11/25 2345   NIV Type   NIV Started/Stopped On   Equipment Type v60   Mode CPAP   Mask Type Full face mask   Mask Size Large   Assessment   Respirations 21   Settings/Measurements   CPAP/EPAP 16 cmH2O   Vt (Measured) 725 mL   FiO2  (S)  35 %   Patient's Home Machine No   Alarm Settings   Alarms On Y   Low Pressure (cmH2O) 6 cmH2O   High Pressure (cmH2O) 30 cmH2O

## 2025-03-12 NOTE — PROGRESS NOTES
Blood ting noted in sputum. Cardiology, Pulmonology, and Hospitalist made aware this AM. Held eliquis this AM.

## 2025-03-13 VITALS
SYSTOLIC BLOOD PRESSURE: 132 MMHG | HEART RATE: 73 BPM | TEMPERATURE: 98.1 F | DIASTOLIC BLOOD PRESSURE: 74 MMHG | OXYGEN SATURATION: 94 % | WEIGHT: 296.7 LBS | RESPIRATION RATE: 18 BRPM | HEIGHT: 70 IN | BODY MASS INDEX: 42.48 KG/M2

## 2025-03-13 LAB
ANISOCYTOSIS BLD QL SMEAR: ABNORMAL
BASOPHILS # BLD: 0 K/UL (ref 0–0.2)
BASOPHILS NFR BLD: 0 %
DEPRECATED RDW RBC AUTO: 17.7 % (ref 12.4–15.4)
EOSINOPHIL # BLD: 0.2 K/UL (ref 0–0.6)
EOSINOPHIL NFR BLD: 2 %
GLUCOSE BLD-MCNC: 142 MG/DL (ref 70–99)
GLUCOSE BLD-MCNC: 173 MG/DL (ref 70–99)
GLUCOSE BLD-MCNC: 191 MG/DL (ref 70–99)
HCT VFR BLD AUTO: 56.2 % (ref 40.5–52.5)
HGB BLD-MCNC: 17.9 G/DL (ref 13.5–17.5)
LYMPHOCYTES # BLD: 3.1 K/UL (ref 1–5.1)
LYMPHOCYTES NFR BLD: 13 %
MACROCYTES BLD QL SMEAR: ABNORMAL
MCH RBC QN AUTO: 28.8 PG (ref 26–34)
MCHC RBC AUTO-ENTMCNC: 31.9 G/DL (ref 31–36)
MCV RBC AUTO: 90.4 FL (ref 80–100)
MICROCYTES BLD QL SMEAR: ABNORMAL
MONOCYTES # BLD: 0.3 K/UL (ref 0–1.3)
MONOCYTES NFR BLD: 3 %
MYELOCYTES NFR BLD MANUAL: 2 %
NEUTROPHILS # BLD: 7.7 K/UL (ref 1.7–7.7)
NEUTROPHILS NFR BLD: 66 %
OVALOCYTES BLD QL SMEAR: ABNORMAL
PATH INTERP BLD-IMP: NORMAL
PATH INTERP BLD-IMP: YES
PERFORMED ON: ABNORMAL
PLATELET # BLD AUTO: 190 K/UL (ref 135–450)
PLATELET BLD QL SMEAR: ADEQUATE
PMV BLD AUTO: 8.2 FL (ref 5–10.5)
POIKILOCYTOSIS BLD QL SMEAR: ABNORMAL
RBC # BLD AUTO: 6.22 M/UL (ref 4.2–5.9)
SLIDE REVIEW: ABNORMAL
VARIANT LYMPHS NFR BLD MANUAL: 14 % (ref 0–6)
WBC # BLD AUTO: 11.3 K/UL (ref 4–11)

## 2025-03-13 PROCEDURE — 94640 AIRWAY INHALATION TREATMENT: CPT

## 2025-03-13 PROCEDURE — 6370000000 HC RX 637 (ALT 250 FOR IP): Performed by: NURSE PRACTITIONER

## 2025-03-13 PROCEDURE — 99232 SBSQ HOSP IP/OBS MODERATE 35: CPT | Performed by: STUDENT IN AN ORGANIZED HEALTH CARE EDUCATION/TRAINING PROGRAM

## 2025-03-13 PROCEDURE — 6370000000 HC RX 637 (ALT 250 FOR IP): Performed by: INTERNAL MEDICINE

## 2025-03-13 PROCEDURE — 6360000002 HC RX W HCPCS: Performed by: INTERNAL MEDICINE

## 2025-03-13 PROCEDURE — 85025 COMPLETE CBC W/AUTO DIFF WBC: CPT

## 2025-03-13 PROCEDURE — 94660 CPAP INITIATION&MGMT: CPT

## 2025-03-13 PROCEDURE — 94761 N-INVAS EAR/PLS OXIMETRY MLT: CPT

## 2025-03-13 PROCEDURE — 6370000000 HC RX 637 (ALT 250 FOR IP)

## 2025-03-13 PROCEDURE — 2700000000 HC OXYGEN THERAPY PER DAY

## 2025-03-13 RX ORDER — IPRATROPIUM BROMIDE 21 UG/1
2 SPRAY, METERED NASAL 2 TIMES DAILY
Qty: 30 ML | Refills: 3 | Status: SHIPPED | OUTPATIENT
Start: 2025-03-13 | End: 2025-03-13

## 2025-03-13 RX ORDER — IPRATROPIUM BROMIDE 21 UG/1
2 SPRAY, METERED NASAL 2 TIMES DAILY
Qty: 30 ML | Refills: 3 | Status: SHIPPED | OUTPATIENT
Start: 2025-03-13

## 2025-03-13 RX ORDER — BUDESONIDE 0.5 MG/2ML
0.5 INHALANT ORAL
Qty: 60 EACH | Refills: 3 | Status: SHIPPED | OUTPATIENT
Start: 2025-03-13 | End: 2025-03-13

## 2025-03-13 RX ORDER — BUDESONIDE 0.5 MG/2ML
0.5 INHALANT ORAL
Qty: 60 EACH | Refills: 3 | Status: SHIPPED | OUTPATIENT
Start: 2025-03-13

## 2025-03-13 RX ADMIN — METOPROLOL SUCCINATE 50 MG: 50 TABLET, FILM COATED, EXTENDED RELEASE ORAL at 09:50

## 2025-03-13 RX ADMIN — BUDESONIDE 500 MCG: 0.5 SUSPENSION RESPIRATORY (INHALATION) at 20:06

## 2025-03-13 RX ADMIN — BUDESONIDE 500 MCG: 0.5 SUSPENSION RESPIRATORY (INHALATION) at 08:51

## 2025-03-13 RX ADMIN — AMIODARONE HYDROCHLORIDE 200 MG: 200 TABLET ORAL at 09:50

## 2025-03-13 RX ADMIN — SACUBITRIL AND VALSARTAN 0.5 TABLET: 24; 26 TABLET, FILM COATED ORAL at 09:49

## 2025-03-13 RX ADMIN — ALBUTEROL SULFATE 2.5 MG: 2.5 SOLUTION RESPIRATORY (INHALATION) at 20:01

## 2025-03-13 RX ADMIN — IPRATROPIUM BROMIDE 2 SPRAY: 21 SPRAY NASAL at 09:50

## 2025-03-13 RX ADMIN — RANOLAZINE 500 MG: 500 TABLET, FILM COATED, EXTENDED RELEASE ORAL at 09:50

## 2025-03-13 RX ADMIN — INSULIN LISPRO 4 UNITS: 100 INJECTION, SOLUTION INTRAVENOUS; SUBCUTANEOUS at 17:52

## 2025-03-13 RX ADMIN — PAROXETINE HYDROCHLORIDE 20 MG: 20 TABLET, FILM COATED ORAL at 09:50

## 2025-03-13 RX ADMIN — FUROSEMIDE 20 MG: 20 TABLET ORAL at 09:50

## 2025-03-13 ASSESSMENT — PAIN SCALES - GENERAL: PAINLEVEL_OUTOF10: 0

## 2025-03-13 NOTE — PROGRESS NOTES
03/12/2025 04:20 AM    LABGLOM 42 03/12/2025 04:20 AM    GLUCOSE 203 03/12/2025 04:20 AM       IMPRESSION/RECOMMENDATIONS:      # CKD 3A-3B: The patient has chronic kidney disease with a baseline creatinine of 1.8 to 2 mg/dl. His urinary output is well maintained and his serum creatinine is currently at baseline, monitor.    - He follows with Dr Hoover as outpatient  - Jardiance on hold  - Monitoring off fluids  - Trend labs, bp's, weights, & urine output      # No significant electrolytes disorders noted.  - Potassium was hemolyzed previously, now wnl.      # HTN: Blood pressure within acceptable range.  Continue same meds.   - PTA Aldactone and Entresto are still held, latter resumed with improved BP's.  - Lasix 20mg daily.      # COPD exacerbation: Management per primary team.  - Oxygen being weaned.  - Complains of hemoptysis, EMILY/ANCA/anti-GBM negative.   - Former smoker.

## 2025-03-13 NOTE — PROGRESS NOTES
03/13/25 0328   NIV Type   $NIV $Daily Charge   NIV Started/Stopped On   Equipment Type v60   Mode CPAP   Mask Type Full face mask   Mask Size Medium   Assessment   Comfort Level Good   Using Accessory Muscles No   Mask Compliance Good   Settings/Measurements   PIP Observed 16 cm H20   CPAP/EPAP 16 cmH2O   Vt (Measured) 504 mL   FiO2  35 %   Minute Volume (L/min) 7.5 Liters   Mask Leak (lpm) 34 lpm   Patient's Home Machine No   Alarm Settings   Alarms On Y

## 2025-03-13 NOTE — PROGRESS NOTES
[facility] Medicine Progress Note  V 1.6      Date of Admission: 3/7/2025    Hospital Day: 7      Chief Admission Complaint:  SOB    Subjective:  Patient seen at bedside this morning. Patient no acute concerns. Patient no fevers, chills, chest pain, sob, hypoxic events.    Presenting Admission History:       Dinesh Partida is a/an 59 y.o. male with a significant past medical history of hypertension, hyperlipidemia, CAD, PAF, HFrEF, SINDY, and type 2 diabetes who presents to [facility]'s emergency department with complaint of 24 hours of shortness of breath that began sometime yesterday afternoon.  He notes that he has intermittent dyspnea, does not require supplemental O2 at home except during sleep with his CPAP. He was hypoxic on arrival with an SpO2 of 85% on RA, requiring 2LNC in the ED. Denies any particular symptoms aside from dyspnea, no palpitations, no chest pain, no cough no chest congestion, no weight gain, no BLE edema.  He admits this happened about a month ago after having dyspnea for a week he was admitted.  However this time, denies any other symptoms.  The dyspnea is noted mostly with exertion, but sometimes feels it at rest as well.  His evaluation here included laboratory studies, EKG, chest x-ray, and CT PE protocol.  CT of the chest did not show any acute PE, there are persistent but improving infiltrates bilaterally, cardiomegaly and emphysema noted.  Laboratory studies reviewed and pertinent for creatinine 1.8, glucose 1 7, proBNP 650, troponin 20 with repeat of 16, ALT 1 6, AST 57, WBC 7.6.  Flu and COVID testing were negative.  VBG showed pH 7.30, pCO2 46, pO2 34, HCO3 22.  Patient started on vancomycin and cefepime in ED along with a DuoNeb and Solu-Medrol 125mg IVP.  Hospital team consulted to admit.     Assessment/Plan:      Current Principal Problem:  Acute hypoxic respiratory failure (HCC)    Acute Hypoxic Respiratory Failure  COPD  Hemoptysis  - Admit to floor  - Acute

## 2025-03-13 NOTE — PROGRESS NOTES
Patient: Dinesh Partida MRN: [id number]  Date of  Admission: 3/7/2025   YOB: 1965  Age: 59 y.o.  Sex: male    Unit: [facility] A2 CARD TELEMETRY  Room/Bed: [address] [place] [address] Admitting Physician: KHADIJAH SAN    Attending Physician:  Sergio Macias MD         Pulmonary Service Note      SUBJECTIVE:    Patient said that he had 1 episode of a tiny amount of bloody sputum.  He had no other events.        OBJECTIVE    Medications    Continuous Infusions:   dextrose      sodium chloride 25 mL (03/11/25 1940)       Scheduled Meds:   tranexamic acid  500 mg Nebulization BID    sacubitril-valsartan  0.5 tablet Oral BID    [Held by provider] apixaban  5 mg Oral BID    magnesium sulfate  2,000 mg IntraVENous Once    amiodarone  200 mg Oral BID    ranolazine  500 mg Oral BID    furosemide  20 mg Oral Daily    azelastine  1 spray Each Nostril BID    metoprolol succinate  50 mg Oral Daily    montelukast  10 mg Oral Nightly    PARoxetine  20 mg Oral Daily    sodium chloride flush  5-40 mL IntraVENous 2 times per day    insulin lispro  0-16 Units SubCUTAneous 4x Daily AC & HS    ipratropium  2 spray Each Nostril BID    budesonide  0.5 mg Nebulization BID RT       PRN Meds:  nitroGLYCERIN, fluticasone, LORazepam, glucose, dextrose bolus **OR** dextrose bolus, glucagon (rDNA), dextrose, sodium chloride flush, sodium chloride, polyethylene glycol, acetaminophen **OR** acetaminophen, prochlorperazine, albuterol    Physical    Patient Vitals for the past 24 hrs:   BP Temp Temp src Pulse Resp SpO2 Weight   03/13/25 1130 129/78 97.8 °F (36.6 °C) Axillary 70 20 98 % --   03/13/25 0851 -- -- -- 72 16 96 % --   03/13/25 0830 133/83 97.5 °F (36.4 °C) Axillary 77 18 96 % --   03/13/25 0420 121/78 97.7 °F (36.5 °C) Axillary 70 17 98 % 134.6 kg (296 lb 11.2 oz)   03/12/25 2310 (!) 147/87 97.6 °F (36.4 °C) Axillary 74 14 97 % --   03/12/25 2130 -- -- -- -- 14 -- --   03/12/25 2048 136/81 97.5 °F (36.4 °C) Axillary 72 19 93 %

## 2025-03-13 NOTE — PLAN OF CARE
Problem: Chronic Conditions and Co-morbidities  Goal: Patient's chronic conditions and co-morbidity symptoms are monitored and maintained or improved  Outcome: Progressing   CHF Care Plan      Patient's EF (Ejection Fraction) is less than 40%    Heart Failure Medications:  Diuretics:: Furosemide    (One of the following REQUIRED for EF </= 40%/SYSTOLIC FAILURE but MAY be used in EF% >40%/DIASTOLIC FAILURE)        ACE:: None        ARB:: None         ARNI:: Sacubitril/Valsartan-Entresto    (Beta Blockers)  NON- Evidenced Based Beta Blocker (for EF% >40%/DIASTOLIC FAILURE): None    Evidenced Based Beta Blocker::(REQUIRED for EF% <40%/SYSTOLIC FAILURE) Metoprolol SUCCinate- Toprol XL  ...................................................................................................................................................    Failed to redirect to the Timeline version of the [employer] SmartLink.      Patient's weights and intake/output reviewed    Daily Weight log at bedside, patient/family participation in use of log: \"yes    Patient's current weight today shows a difference of 1 lbs less than last documented weight.      Intake/Output Summary (Last 24 hours) at 3/13/2025 0038  Last data filed at 3/12/2025 2200  Gross per 24 hour   Intake 952 ml   Output 3700 ml   Net -2748 ml       Education Booklet Provided: yes    Comorbidities Reviewed Yes    Patient has a past medical history of Asthma, CAD (coronary artery disease), CHF (congestive heart failure) (HCC), Hyperlipidemia, Hypertension, SINDY (obstructive sleep apnea), PAF (paroxysmal atrial fibrillation) (HCC), and Type 2 diabetes mellitus (HCC).     >>For CHF and Comorbidity documentation on Education Time and Topics, please see Education Tab      CHF Education    Learners:  Patient  Readineess:   Acceptance  Method:   Explanation  Response:   Verbalizes Understanding  Comments:     Time Spent: 5 mins       Pt resting in bed at this time on BiPAP. Pt denies  Adult  Goal: Return ADL status to a safe level of function  Outcome: Progressing     Problem: Metabolic/Fluid and Electrolytes - Adult  Goal: Electrolytes maintained within normal limits  Outcome: Progressing     Problem: Metabolic/Fluid and Electrolytes - Adult  Goal: Hemodynamic stability and optimal renal function maintained  Outcome: Progressing     Problem: Metabolic/Fluid and Electrolytes - Adult  Goal: Glucose maintained within prescribed range  Outcome: Progressing     Problem: Anxiety  Goal: Will report anxiety at manageable levels  Description: INTERVENTIONS:  1. Administer medication as ordered  2. Teach and rehearse alternative coping skills  3. Provide emotional support with 1:1 interaction with staff  Outcome: Progressing     Problem: Skin/Tissue Integrity  Goal: Skin integrity remains intact  Description: 1.  Monitor for areas of redness and/or skin breakdown  2.  Assess vascular access sites hourly  3.  Every 4-6 hours minimum:  Change oxygen saturation probe site  4.  Every 4-6 hours:  If on nasal continuous positive airway pressure, respiratory therapy assess nares and determine need for appliance change or resting period  Outcome: Progressing

## 2025-03-13 NOTE — PROGRESS NOTES
03/12/25 2130   NIV Type   NIV Started/Stopped On   Equipment Type v60   Mode CPAP   Mask Type Full face mask   Mask Size Medium   Assessment   Respirations 14   Comfort Level Good   Using Accessory Muscles No   Mask Compliance Good   Skin Assessment Clean, dry, & intact   Skin Protection for O2 Device Yes   Orientation Middle   Location Nose   Intervention(s) Skin Barrier   Settings/Measurements   PIP Observed 16 cm H20   CPAP/EPAP 16 cmH2O   Vt (Measured) 829 mL   FiO2  35 %   Minute Volume (L/min) 10.6 Liters   Mask Leak (lpm) 43 lpm   Patient's Home Machine No   Alarm Settings   Alarms On Y   Low Pressure (cmH2O) 6 cmH2O   High Pressure (cmH2O) 30 cmH2O   Apnea (secs) 20 secs   RR Low (bpm) 6   RR High (bpm) 40 br/min

## 2025-03-13 NOTE — PLAN OF CARE
Cross cover, called to send prescriptions to [facility] instead of [facility] which is now closed.

## 2025-03-13 NOTE — DISCHARGE INSTR - COC
Continuity of Care Form    Patient Name: Dinesh Partida   :  1965  MRN:  [id number]    Admit date:  3/7/2025  Discharge date:  ***    Code Status Order: Full Code   Advance Directives:     Admitting Physician:  Leighton Abdul MD  PCP: Zee Patel MD    Discharging Nurse: ***  Discharging Hospital Unit/Room#: 0219/0219-01  Discharging Unit Phone Number: ***    Emergency Contact:   Extended Emergency Contact Information  Primary Emergency Contact: Julián Partida  Home Phone: [phone]  Mobile Phone: [phone]  Relation: Other Relative  Secondary Emergency Contact: ELENITA SUAREZ  Home Phone: [phone]  Mobile Phone: [phone]  Relation: Other    Past Surgical History:  Past Surgical History:   Procedure Laterality Date    CARDIAC ELECTROPHYSIOLOGY STUDY AND ABLATION      had for afib    PACEMAKER INSERTION Left        Immunization History:   Immunization History   Administered Date(s) Administered    COVID-19, PFIZER PURPLE top, DILUTE for use, (age 12 y+), 30mcg/0.3mL 2021, 2021       Active Problems:  Patient Active Problem List   Diagnosis Code    Acute hypoxic respiratory failure ([facility]) J96.01    Acute on chronic heart failure with reduced ejection fraction (HFrEF, <= 40%) ([facility]) I50.23    Ischemic cardiomyopathy I25.5    Coronary artery disease involving native coronary artery of native heart without angina pectoris I25.10    VT (ventricular tachycardia) ([facility]) I47.20    Pneumonia of both lungs due to infectious organism J18.9    Stage 3a chronic kidney disease (HCC) N18.31    Hypoxia R09.02    CAD in native artery I25.10    Chronic HFrEF (heart failure with reduced ejection fraction) ([facility]) I50.22       Isolation/Infection:   Isolation            No Isolation          Patient Infection Status    None to display              Nurse Assessment:  Last Vital Signs: /78   Pulse 70   Temp 97.8 °F (36.6 °C) (Axillary)   Resp 20   Ht 1.778 m (5' 10\")   Wt 134.6 kg (296 lb

## 2025-03-13 NOTE — PROGRESS NOTES
03/12/25 1608   NIV Type   NIV Started/Stopped On   Equipment Type v60   Mode CPAP   Mask Type Full face mask   Mask Size Medium   Assessment   Comfort Level Good   Using Accessory Muscles No   Mask Compliance Good   Settings/Measurements   PIP Observed 17 cm H20   CPAP/EPAP 16 cmH2O   Vt (Measured) 673 mL   FiO2  35 %   Minute Volume (L/min) 12.9 Liters   Mask Leak (lpm) 13 lpm   Patient's Home Machine No   Alarm Settings   Alarms On Y

## 2025-03-13 NOTE — CARE COORDINATION
Chart reviewed day #5. Per chart review and RN,  patient still being followed by Nephro and Pulm.  Spoke with patient at bedside for introduction.   He is from home with his significant other and and was independent prior to admission.   CM will continue to follow; watch for home oxygen needs.

## 2025-03-14 ENCOUNTER — FOLLOWUP TELEPHONE ENCOUNTER (OUTPATIENT)
Dept: TELEMETRY | Age: 60
End: 2025-03-14

## 2025-03-14 NOTE — DISCHARGE SUMMARY
Hospital Medicine Discharge Summary    Patient: Dinesh Partida   : 1965     Hospital:  [facility]  Admit Date: 3/7/2025   Discharge Date: 3/13/2025    Disposition:  [x]Home   []HHC  []SNF  []Acute Rehab  []LTAC  []Hospice  Code status:  [x]Full  []DNR/CCA  []Limited (DNR/CCA with Do Not Intubate)  []DNRCC  Condition at Discharge: Stable  Primary Care Provider: Zee Patel MD    Admitting Provider: Leighton Abdul MD  Discharge Provider: Sergio Macias MD     Discharge Diagnoses:      Active Hospital Problems    Diagnosis     CAD in native artery [I25.10]     Chronic HFrEF (heart failure with reduced ejection fraction) (HCC) [I50.22]     Hypoxia [R09.02]     Acute hypoxic respiratory failure (HCC) [J96.01]        Presenting Admission History:      Dinesh Partida is a/an 59 y.o. male with a significant past medical history of hypertension, hyperlipidemia, CAD, PAF, HFrEF, SINDY, and type 2 diabetes who presents to [facility]'s emergency department with complaint of 24 hours of shortness of breath that began sometime yesterday afternoon. He notes that he has intermittent dyspnea, does not require supplemental O2 at home except during sleep with his CPAP. He was hypoxic on arrival with an SpO2 of 85% on RA, requiring 2LNC in the ED. Denies any particular symptoms aside from dyspnea, no palpitations, no chest pain, no cough no chest congestion, no weight gain, no BLE edema. He admits this happened about a month ago after having dyspnea for a week he was admitted. However this time, denies any other symptoms. The dyspnea is noted mostly with exertion, but sometimes feels it at rest as well. His evaluation here included laboratory studies, EKG, chest x-ray, and CT PE protocol. CT of the chest did not show any acute PE, there are persistent but improving infiltrates bilaterally, cardiomegaly and emphysema noted. Laboratory studies reviewed and pertinent for

## 2025-03-14 NOTE — PROGRESS NOTES
Pt discharged home via private vehicle. Discharge instructions reviewed with pt by primary RN, Fatmata. Telemetry and IV removed. Pt wheeled down to entrance in wheelchair with PCT in stable condition.

## 2025-03-15 ENCOUNTER — APPOINTMENT (OUTPATIENT)
Dept: GENERAL RADIOLOGY | Age: 60
DRG: 189 | End: 2025-03-15
Payer: MEDICARE

## 2025-03-15 ENCOUNTER — HOSPITAL ENCOUNTER (INPATIENT)
Age: 60
LOS: 5 days | Discharge: HOME HEALTH | DRG: 189 | End: 2025-03-20
Attending: EMERGENCY MEDICINE | Admitting: INTERNAL MEDICINE
Payer: MEDICARE

## 2025-03-15 DIAGNOSIS — J18.9 PNEUMONIA OF BOTH LUNGS DUE TO INFECTIOUS ORGANISM, UNSPECIFIED PART OF LUNG: ICD-10-CM

## 2025-03-15 DIAGNOSIS — J10.1 INFLUENZA A: ICD-10-CM

## 2025-03-15 DIAGNOSIS — J96.01 ACUTE RESPIRATORY FAILURE WITH HYPOXIA: Primary | ICD-10-CM

## 2025-03-15 LAB
ALBUMIN SERPL-MCNC: 4 G/DL (ref 3.4–5)
ALBUMIN/GLOB SERPL: 1.2 {RATIO} (ref 1.1–2.2)
ALP SERPL-CCNC: 78 U/L (ref 40–129)
ALT SERPL-CCNC: 88 U/L (ref 10–40)
ANION GAP SERPL CALCULATED.3IONS-SCNC: 14 MMOL/L (ref 3–16)
AST SERPL-CCNC: 33 U/L (ref 15–37)
BASE EXCESS BLDV CALC-SCNC: -0.5 MMOL/L (ref -3–3)
BASOPHILS # BLD: 0 K/UL (ref 0–0.2)
BASOPHILS NFR BLD: 0.4 %
BILIRUB SERPL-MCNC: 0.6 MG/DL (ref 0–1)
BUN SERPL-MCNC: 34 MG/DL (ref 7–20)
CALCIUM SERPL-MCNC: 9.2 MG/DL (ref 8.3–10.6)
CHLORIDE SERPL-SCNC: 96 MMOL/L (ref 99–110)
CO2 BLDV-SCNC: 26 MMOL/L
CO2 SERPL-SCNC: 22 MMOL/L (ref 21–32)
COHGB MFR BLDV: 2.8 % (ref 0–1.5)
CREAT SERPL-MCNC: 1.8 MG/DL (ref 0.8–1.3)
DEPRECATED RDW RBC AUTO: 17.2 % (ref 12.4–15.4)
EOSINOPHIL # BLD: 0 K/UL (ref 0–0.6)
EOSINOPHIL NFR BLD: 0.3 %
FLUAV RNA RESP QL NAA+PROBE: DETECTED
FLUBV RNA RESP QL NAA+PROBE: NOT DETECTED
GFR SERPLBLD CREATININE-BSD FMLA CKD-EPI: 42 ML/MIN/{1.73_M2}
GLUCOSE BLD-MCNC: 142 MG/DL (ref 70–99)
GLUCOSE SERPL-MCNC: 202 MG/DL (ref 70–99)
HCO3 BLDV-SCNC: 25.1 MMOL/L (ref 23–29)
HCT VFR BLD AUTO: 54.3 % (ref 40.5–52.5)
HGB BLD-MCNC: 18.5 G/DL (ref 13.5–17.5)
LYMPHOCYTES # BLD: 0.4 K/UL (ref 1–5.1)
LYMPHOCYTES NFR BLD: 4.5 %
MCH RBC QN AUTO: 29.7 PG (ref 26–34)
MCHC RBC AUTO-ENTMCNC: 34 G/DL (ref 31–36)
MCV RBC AUTO: 87.5 FL (ref 80–100)
METHGB MFR BLDV: 0.3 %
MONOCYTES # BLD: 0.8 K/UL (ref 0–1.3)
MONOCYTES NFR BLD: 8 %
NEUTROPHILS # BLD: 8.6 K/UL (ref 1.7–7.7)
NEUTROPHILS NFR BLD: 86.8 %
NT-PROBNP SERPL-MCNC: 1438 PG/ML (ref 0–124)
O2 THERAPY: ABNORMAL
PCO2 BLDV: 44.1 MMHG (ref 40–50)
PERFORMED ON: ABNORMAL
PH BLDV: 7.37 [PH] (ref 7.35–7.45)
PLATELET # BLD AUTO: 184 K/UL (ref 135–450)
PMV BLD AUTO: 7.7 FL (ref 5–10.5)
PO2 BLDV: 32.2 MMHG (ref 25–40)
POTASSIUM SERPL-SCNC: 4.2 MMOL/L (ref 3.5–5.1)
PROT SERPL-MCNC: 7.3 G/DL (ref 6.4–8.2)
RBC # BLD AUTO: 6.21 M/UL (ref 4.2–5.9)
SAO2 % BLDV: 62 %
SARS-COV-2 RNA RESP QL NAA+PROBE: NOT DETECTED
SODIUM SERPL-SCNC: 132 MMOL/L (ref 136–145)
TROPONIN, HIGH SENSITIVITY: 20 NG/L (ref 0–22)
TROPONIN, HIGH SENSITIVITY: 21 NG/L (ref 0–22)
WBC # BLD AUTO: 9.9 K/UL (ref 4–11)

## 2025-03-15 PROCEDURE — 94761 N-INVAS EAR/PLS OXIMETRY MLT: CPT

## 2025-03-15 PROCEDURE — 6370000000 HC RX 637 (ALT 250 FOR IP): Performed by: PHYSICIAN ASSISTANT

## 2025-03-15 PROCEDURE — 99285 EMERGENCY DEPT VISIT HI MDM: CPT

## 2025-03-15 PROCEDURE — 80053 COMPREHEN METABOLIC PANEL: CPT

## 2025-03-15 PROCEDURE — 6370000000 HC RX 637 (ALT 250 FOR IP): Performed by: NURSE PRACTITIONER

## 2025-03-15 PROCEDURE — 2700000000 HC OXYGEN THERAPY PER DAY

## 2025-03-15 PROCEDURE — 93005 ELECTROCARDIOGRAM TRACING: CPT | Performed by: EMERGENCY MEDICINE

## 2025-03-15 PROCEDURE — 82803 BLOOD GASES ANY COMBINATION: CPT

## 2025-03-15 PROCEDURE — 36415 COLL VENOUS BLD VENIPUNCTURE: CPT

## 2025-03-15 PROCEDURE — 85025 COMPLETE CBC W/AUTO DIFF WBC: CPT

## 2025-03-15 PROCEDURE — 94640 AIRWAY INHALATION TREATMENT: CPT

## 2025-03-15 PROCEDURE — 1200000000 HC SEMI PRIVATE

## 2025-03-15 PROCEDURE — 84484 ASSAY OF TROPONIN QUANT: CPT

## 2025-03-15 PROCEDURE — 83880 ASSAY OF NATRIURETIC PEPTIDE: CPT

## 2025-03-15 PROCEDURE — 87636 SARSCOV2 & INF A&B AMP PRB: CPT

## 2025-03-15 PROCEDURE — 71045 X-RAY EXAM CHEST 1 VIEW: CPT

## 2025-03-15 PROCEDURE — 2500000003 HC RX 250 WO HCPCS: Performed by: NURSE PRACTITIONER

## 2025-03-15 RX ORDER — PAROXETINE 20 MG/1
20 TABLET, FILM COATED ORAL DAILY
Status: DISCONTINUED | OUTPATIENT
Start: 2025-03-16 | End: 2025-03-20 | Stop reason: HOSPADM

## 2025-03-15 RX ORDER — BUDESONIDE 0.5 MG/2ML
0.5 INHALANT ORAL
Status: DISCONTINUED | OUTPATIENT
Start: 2025-03-15 | End: 2025-03-20 | Stop reason: HOSPADM

## 2025-03-15 RX ORDER — ACETAMINOPHEN 650 MG/1
650 SUPPOSITORY RECTAL EVERY 6 HOURS PRN
Status: DISCONTINUED | OUTPATIENT
Start: 2025-03-15 | End: 2025-03-20 | Stop reason: HOSPADM

## 2025-03-15 RX ORDER — POLYETHYLENE GLYCOL 3350 17 G/17G
17 POWDER, FOR SOLUTION ORAL DAILY PRN
Status: DISCONTINUED | OUTPATIENT
Start: 2025-03-15 | End: 2025-03-20 | Stop reason: HOSPADM

## 2025-03-15 RX ORDER — ACETAMINOPHEN 325 MG/1
650 TABLET ORAL EVERY 6 HOURS PRN
Status: DISCONTINUED | OUTPATIENT
Start: 2025-03-15 | End: 2025-03-20 | Stop reason: HOSPADM

## 2025-03-15 RX ORDER — SODIUM CHLORIDE 0.9 % (FLUSH) 0.9 %
5-40 SYRINGE (ML) INJECTION EVERY 12 HOURS SCHEDULED
Status: DISCONTINUED | OUTPATIENT
Start: 2025-03-15 | End: 2025-03-20 | Stop reason: HOSPADM

## 2025-03-15 RX ORDER — FUROSEMIDE 20 MG/1
20 TABLET ORAL DAILY
Status: DISCONTINUED | OUTPATIENT
Start: 2025-03-16 | End: 2025-03-20 | Stop reason: HOSPADM

## 2025-03-15 RX ORDER — FLUTICASONE PROPIONATE 50 MCG
2 SPRAY, SUSPENSION (ML) NASAL DAILY PRN
Status: DISCONTINUED | OUTPATIENT
Start: 2025-03-15 | End: 2025-03-20 | Stop reason: HOSPADM

## 2025-03-15 RX ORDER — INSULIN LISPRO 100 [IU]/ML
0-8 INJECTION, SOLUTION INTRAVENOUS; SUBCUTANEOUS
Status: DISCONTINUED | OUTPATIENT
Start: 2025-03-15 | End: 2025-03-20 | Stop reason: HOSPADM

## 2025-03-15 RX ORDER — OSELTAMIVIR PHOSPHATE 30 MG/1
30 CAPSULE ORAL DAILY
Status: DISCONTINUED | OUTPATIENT
Start: 2025-03-16 | End: 2025-03-15

## 2025-03-15 RX ORDER — SODIUM CHLORIDE 0.9 % (FLUSH) 0.9 %
5-40 SYRINGE (ML) INJECTION PRN
Status: DISCONTINUED | OUTPATIENT
Start: 2025-03-15 | End: 2025-03-20 | Stop reason: HOSPADM

## 2025-03-15 RX ORDER — ONDANSETRON 4 MG/1
4 TABLET, ORALLY DISINTEGRATING ORAL EVERY 8 HOURS PRN
Status: DISCONTINUED | OUTPATIENT
Start: 2025-03-15 | End: 2025-03-20 | Stop reason: HOSPADM

## 2025-03-15 RX ORDER — ALBUTEROL SULFATE 90 UG/1
2 INHALANT RESPIRATORY (INHALATION) EVERY 4 HOURS PRN
Status: DISCONTINUED | OUTPATIENT
Start: 2025-03-15 | End: 2025-03-16

## 2025-03-15 RX ORDER — OSELTAMIVIR PHOSPHATE 75 MG/1
75 CAPSULE ORAL ONCE
Status: COMPLETED | OUTPATIENT
Start: 2025-03-15 | End: 2025-03-15

## 2025-03-15 RX ORDER — LORAZEPAM 1 MG/1
1 TABLET ORAL 3 TIMES DAILY PRN
Status: DISCONTINUED | OUTPATIENT
Start: 2025-03-15 | End: 2025-03-20 | Stop reason: HOSPADM

## 2025-03-15 RX ORDER — MONTELUKAST SODIUM 10 MG/1
10 TABLET ORAL NIGHTLY
Status: DISCONTINUED | OUTPATIENT
Start: 2025-03-15 | End: 2025-03-20 | Stop reason: HOSPADM

## 2025-03-15 RX ORDER — IPRATROPIUM BROMIDE AND ALBUTEROL SULFATE 2.5; .5 MG/3ML; MG/3ML
1 SOLUTION RESPIRATORY (INHALATION) ONCE
Status: COMPLETED | OUTPATIENT
Start: 2025-03-15 | End: 2025-03-15

## 2025-03-15 RX ORDER — SODIUM CHLORIDE 9 MG/ML
INJECTION, SOLUTION INTRAVENOUS PRN
Status: DISCONTINUED | OUTPATIENT
Start: 2025-03-15 | End: 2025-03-20 | Stop reason: HOSPADM

## 2025-03-15 RX ORDER — TRAMADOL HYDROCHLORIDE 50 MG/1
50 TABLET ORAL EVERY 6 HOURS PRN
Status: DISCONTINUED | OUTPATIENT
Start: 2025-03-15 | End: 2025-03-18

## 2025-03-15 RX ORDER — OSELTAMIVIR PHOSPHATE 30 MG/1
30 CAPSULE ORAL 2 TIMES DAILY
Status: DISCONTINUED | OUTPATIENT
Start: 2025-03-16 | End: 2025-03-20

## 2025-03-15 RX ORDER — DEXTROSE MONOHYDRATE 100 MG/ML
INJECTION, SOLUTION INTRAVENOUS CONTINUOUS PRN
Status: DISCONTINUED | OUTPATIENT
Start: 2025-03-15 | End: 2025-03-20 | Stop reason: HOSPADM

## 2025-03-15 RX ORDER — AMIODARONE HYDROCHLORIDE 200 MG/1
400 TABLET ORAL 2 TIMES DAILY
Status: DISCONTINUED | OUTPATIENT
Start: 2025-03-15 | End: 2025-03-18

## 2025-03-15 RX ORDER — METOPROLOL SUCCINATE 50 MG/1
50 TABLET, EXTENDED RELEASE ORAL DAILY
Status: DISCONTINUED | OUTPATIENT
Start: 2025-03-16 | End: 2025-03-18

## 2025-03-15 RX ORDER — ALBUTEROL SULFATE 90 UG/1
2 INHALANT RESPIRATORY (INHALATION) EVERY 6 HOURS PRN
Status: DISCONTINUED | OUTPATIENT
Start: 2025-03-15 | End: 2025-03-16

## 2025-03-15 RX ORDER — ONDANSETRON 2 MG/ML
4 INJECTION INTRAMUSCULAR; INTRAVENOUS EVERY 6 HOURS PRN
Status: DISCONTINUED | OUTPATIENT
Start: 2025-03-15 | End: 2025-03-20 | Stop reason: HOSPADM

## 2025-03-15 RX ORDER — GLUCAGON 1 MG/ML
1 KIT INJECTION PRN
Status: DISCONTINUED | OUTPATIENT
Start: 2025-03-15 | End: 2025-03-20 | Stop reason: HOSPADM

## 2025-03-15 RX ADMIN — AMIODARONE HYDROCHLORIDE 400 MG: 200 TABLET ORAL at 23:35

## 2025-03-15 RX ADMIN — SODIUM CHLORIDE, PRESERVATIVE FREE 10 ML: 5 INJECTION INTRAVENOUS at 23:38

## 2025-03-15 RX ADMIN — SACUBITRIL AND VALSARTAN 0.5 TABLET: 24; 26 TABLET, FILM COATED ORAL at 23:35

## 2025-03-15 RX ADMIN — TRAMADOL HYDROCHLORIDE 50 MG: 50 TABLET, COATED ORAL at 23:35

## 2025-03-15 RX ADMIN — APIXABAN 5 MG: 5 TABLET, FILM COATED ORAL at 23:35

## 2025-03-15 RX ADMIN — IPRATROPIUM BROMIDE AND ALBUTEROL SULFATE 1 DOSE: 2.5; .5 SOLUTION RESPIRATORY (INHALATION) at 19:09

## 2025-03-15 RX ADMIN — OSELTAMIVIR 75 MG: 75 CAPSULE ORAL at 19:27

## 2025-03-15 ASSESSMENT — PAIN SCALES - GENERAL
PAINLEVEL_OUTOF10: 6
PAINLEVEL_OUTOF10: 0

## 2025-03-15 ASSESSMENT — PAIN - FUNCTIONAL ASSESSMENT: PAIN_FUNCTIONAL_ASSESSMENT: 0-10

## 2025-03-15 ASSESSMENT — PAIN DESCRIPTION - LOCATION: LOCATION: BACK

## 2025-03-15 NOTE — ED PROVIDER NOTES
I independently examined and evaluated Dinesh Partida.    In brief, patient is a 59-year-old male who presents to the emergency department for evaluation of shortness of breath.  He has had a mild cough.  Reports wearing 2 L nasal cannula at nighttime.  He is requiring 2 L in the emergency department to maintain oxygen saturation above 90%.  VBG with pH of 7.373, pCO2 44.  Troponin within normal limits.  No leukocytosis.  Hemoglobin is 18.5.  Creatinine is 1.8, no significant change compared to his priors.  BNP elevated at 1438.  He tested positive for influenza A, which I suspect is the cause for his symptoms.  Hospitalist consulted for admission.    The Ekg interpreted by me shows  Sinus rhythm with first-degree block with a rate of 75  Nonspecific intraventricular block  QTc is prolonged at 477   ST Segments: Anterolateral infarct, age undetermined    All diagnostic, treatment, and disposition decisions were made by myself in conjunction with the advanced practice provider/resident physician.     I personally saw the patient and performed a substantive portion of the visit including aspects of the medical decision making. I approved management plan and take responsibility for the patient management.     I personally saw the patient and independently provided 0minutes of non-concurrent critical care out of the total shared critical care time provided.    Comment: Please note this report has been produced using speech recognition software and may contain errors related to that system including errors in grammar, punctuation, and spelling, as well as words and phrases that may be inappropriate. If there are any questions or concerns please feel free to contact the dictating provider for clarification.    For all further details of the patient's emergency department visit, please see the advanced practice provider's documentation.        Deena Longo MD  03/15/25 2247    
Number of children: Not on file    Years of education: Not on file    Highest education level: Not on file   Occupational History    Not on file   Tobacco Use    Smoking status: Former     Current packs/day: 0.00     Average packs/day: 3.0 packs/day for 35.0 years (105.0 ttl pk-yrs)     Types: Cigarettes     Start date: 1983     Quit date: 2018     Years since quittin.2    Smokeless tobacco: Never   Vaping Use    Vaping status: Former    Substances: Nicotine, Flavoring   Substance and Sexual Activity    Alcohol use: Not Currently    Drug use: Not Currently     Types: Marijuana (Weed), Cocaine    Sexual activity: Yes     Partners: Female   Other Topics Concern    Not on file   Social History Narrative    Not on file     Social Drivers of Health     Financial Resource Strain: Not on file   Food Insecurity: No Food Insecurity (3/15/2025)    Hunger Vital Sign     Worried About Running Out of Food in the Last Year: Never true     Ran Out of Food in the Last Year: Never true   Transportation Needs: No Transportation Needs (3/15/2025)    PRAPARE - Transportation     Lack of Transportation (Medical): No     Lack of Transportation (Non-Medical): No   Physical Activity: Not on file   Stress: Not on file   Social Connections: Not on file   Intimate Partner Violence: Not At Risk (10/4/2024)    Received from [facility]    Interpersonal Safety     Do you feel physically or emotionally unsafe where you currently live?: No     Within the past 12 months, have you been hit, slapped, kicked or otherwise physically hurt by anyone? : No     Within the past 12 months, have you been hit, slapped, kicked or otherwise physically hurt by anyone? : No   Housing Stability: Low Risk  (3/15/2025)    Housing Stability Vital Sign     Unable to Pay for Housing in the Last Year: No     Number of Times Moved in the Last Year: 0     Homeless in the Last Year: No     Current Medications:  Current

## 2025-03-16 LAB
ANION GAP SERPL CALCULATED.3IONS-SCNC: 15 MMOL/L (ref 3–16)
ANISOCYTOSIS BLD QL SMEAR: ABNORMAL
BASOPHILS # BLD: 0 K/UL (ref 0–0.2)
BASOPHILS NFR BLD: 0 %
BUN SERPL-MCNC: 38 MG/DL (ref 7–20)
CALCIUM SERPL-MCNC: 8.7 MG/DL (ref 8.3–10.6)
CHLORIDE SERPL-SCNC: 97 MMOL/L (ref 99–110)
CO2 SERPL-SCNC: 20 MMOL/L (ref 21–32)
CREAT SERPL-MCNC: 2 MG/DL (ref 0.8–1.3)
DEPRECATED RDW RBC AUTO: 16.9 % (ref 12.4–15.4)
EKG ATRIAL RATE: 74 BPM
EKG DIAGNOSIS: NORMAL
EKG P AXIS: 9 DEGREES
EKG P-R INTERVAL: 232 MS
EKG Q-T INTERVAL: 446 MS
EKG QRS DURATION: 142 MS
EKG QTC CALCULATION (BAZETT): 495 MS
EKG R AXIS: 70 DEGREES
EKG T AXIS: -67 DEGREES
EKG VENTRICULAR RATE: 74 BPM
EOSINOPHIL # BLD: 0 K/UL (ref 0–0.6)
EOSINOPHIL NFR BLD: 0 %
GFR SERPLBLD CREATININE-BSD FMLA CKD-EPI: 37 ML/MIN/{1.73_M2}
GLUCOSE BLD-MCNC: 123 MG/DL (ref 70–99)
GLUCOSE BLD-MCNC: 129 MG/DL (ref 70–99)
GLUCOSE BLD-MCNC: 132 MG/DL (ref 70–99)
GLUCOSE BLD-MCNC: 134 MG/DL (ref 70–99)
GLUCOSE SERPL-MCNC: 145 MG/DL (ref 70–99)
HCT VFR BLD AUTO: 50.5 % (ref 40.5–52.5)
HGB BLD-MCNC: 17 G/DL (ref 13.5–17.5)
LYMPHOCYTES # BLD: 0.6 K/UL (ref 1–5.1)
LYMPHOCYTES NFR BLD: 7 %
MCH RBC QN AUTO: 29.2 PG (ref 26–34)
MCHC RBC AUTO-ENTMCNC: 33.6 G/DL (ref 31–36)
MCV RBC AUTO: 87 FL (ref 80–100)
MICROCYTES BLD QL SMEAR: ABNORMAL
MONOCYTES # BLD: 1 K/UL (ref 0–1.3)
MONOCYTES NFR BLD: 11 %
NEUTROPHILS # BLD: 7.3 K/UL (ref 1.7–7.7)
NEUTROPHILS NFR BLD: 82 %
PATH INTERP BLD-IMP: NO
PERFORMED ON: ABNORMAL
PLATELET # BLD AUTO: 143 K/UL (ref 135–450)
PMV BLD AUTO: 8.2 FL (ref 5–10.5)
POTASSIUM SERPL-SCNC: 3.9 MMOL/L (ref 3.5–5.1)
RBC # BLD AUTO: 5.8 M/UL (ref 4.2–5.9)
SODIUM SERPL-SCNC: 132 MMOL/L (ref 136–145)
WBC # BLD AUTO: 8.9 K/UL (ref 4–11)

## 2025-03-16 PROCEDURE — 94761 N-INVAS EAR/PLS OXIMETRY MLT: CPT

## 2025-03-16 PROCEDURE — 94640 AIRWAY INHALATION TREATMENT: CPT

## 2025-03-16 PROCEDURE — 6360000002 HC RX W HCPCS: Performed by: NURSE PRACTITIONER

## 2025-03-16 PROCEDURE — 2700000000 HC OXYGEN THERAPY PER DAY

## 2025-03-16 PROCEDURE — 2500000003 HC RX 250 WO HCPCS: Performed by: NURSE PRACTITIONER

## 2025-03-16 PROCEDURE — 99222 1ST HOSP IP/OBS MODERATE 55: CPT | Performed by: STUDENT IN AN ORGANIZED HEALTH CARE EDUCATION/TRAINING PROGRAM

## 2025-03-16 PROCEDURE — 85025 COMPLETE CBC W/AUTO DIFF WBC: CPT

## 2025-03-16 PROCEDURE — 6370000000 HC RX 637 (ALT 250 FOR IP): Performed by: NURSE PRACTITIONER

## 2025-03-16 PROCEDURE — 80048 BASIC METABOLIC PNL TOTAL CA: CPT

## 2025-03-16 PROCEDURE — 94660 CPAP INITIATION&MGMT: CPT

## 2025-03-16 PROCEDURE — 5A09457 ASSISTANCE WITH RESPIRATORY VENTILATION, 24-96 CONSECUTIVE HOURS, CONTINUOUS POSITIVE AIRWAY PRESSURE: ICD-10-PCS | Performed by: INTERNAL MEDICINE

## 2025-03-16 PROCEDURE — 93010 ELECTROCARDIOGRAM REPORT: CPT | Performed by: INTERNAL MEDICINE

## 2025-03-16 PROCEDURE — 1200000000 HC SEMI PRIVATE

## 2025-03-16 PROCEDURE — 36415 COLL VENOUS BLD VENIPUNCTURE: CPT

## 2025-03-16 RX ORDER — ALBUTEROL SULFATE 0.83 MG/ML
2.5 SOLUTION RESPIRATORY (INHALATION) EVERY 4 HOURS PRN
Status: DISCONTINUED | OUTPATIENT
Start: 2025-03-16 | End: 2025-03-20 | Stop reason: HOSPADM

## 2025-03-16 RX ORDER — ALBUTEROL SULFATE 90 UG/1
2 INHALANT RESPIRATORY (INHALATION) EVERY 6 HOURS PRN
Status: DISCONTINUED | OUTPATIENT
Start: 2025-03-16 | End: 2025-03-20 | Stop reason: HOSPADM

## 2025-03-16 RX ADMIN — AMIODARONE HYDROCHLORIDE 400 MG: 200 TABLET ORAL at 09:06

## 2025-03-16 RX ADMIN — OSELTAMIVIR PHOSPHATE 30 MG: 30 CAPSULE ORAL at 21:21

## 2025-03-16 RX ADMIN — AMIODARONE HYDROCHLORIDE 400 MG: 200 TABLET ORAL at 21:21

## 2025-03-16 RX ADMIN — FUROSEMIDE 20 MG: 20 TABLET ORAL at 09:10

## 2025-03-16 RX ADMIN — SODIUM CHLORIDE, PRESERVATIVE FREE 10 ML: 5 INJECTION INTRAVENOUS at 21:23

## 2025-03-16 RX ADMIN — PAROXETINE HYDROCHLORIDE 20 MG: 20 TABLET, FILM COATED ORAL at 09:06

## 2025-03-16 RX ADMIN — BUDESONIDE 500 MCG: 0.5 SUSPENSION RESPIRATORY (INHALATION) at 19:15

## 2025-03-16 RX ADMIN — TRAMADOL HYDROCHLORIDE 50 MG: 50 TABLET, COATED ORAL at 21:22

## 2025-03-16 RX ADMIN — METOPROLOL SUCCINATE 50 MG: 50 TABLET, EXTENDED RELEASE ORAL at 09:07

## 2025-03-16 RX ADMIN — APIXABAN 5 MG: 5 TABLET, FILM COATED ORAL at 09:06

## 2025-03-16 RX ADMIN — SACUBITRIL AND VALSARTAN 0.5 TABLET: 24; 26 TABLET, FILM COATED ORAL at 21:21

## 2025-03-16 RX ADMIN — OSELTAMIVIR PHOSPHATE 30 MG: 30 CAPSULE ORAL at 09:06

## 2025-03-16 RX ADMIN — APIXABAN 5 MG: 5 TABLET, FILM COATED ORAL at 21:21

## 2025-03-16 RX ADMIN — BUDESONIDE 500 MCG: 0.5 SUSPENSION RESPIRATORY (INHALATION) at 07:31

## 2025-03-16 RX ADMIN — SACUBITRIL AND VALSARTAN 0.5 TABLET: 24; 26 TABLET, FILM COATED ORAL at 09:06

## 2025-03-16 RX ADMIN — SODIUM CHLORIDE, PRESERVATIVE FREE 10 ML: 5 INJECTION INTRAVENOUS at 09:07

## 2025-03-16 RX ADMIN — TRAMADOL HYDROCHLORIDE 50 MG: 50 TABLET, COATED ORAL at 09:10

## 2025-03-16 RX ADMIN — BUDESONIDE 500 MCG: 0.5 SUSPENSION RESPIRATORY (INHALATION) at 00:23

## 2025-03-16 ASSESSMENT — PAIN DESCRIPTION - DESCRIPTORS: DESCRIPTORS: ACHING;DISCOMFORT

## 2025-03-16 ASSESSMENT — PAIN DESCRIPTION - LOCATION: LOCATION: BACK;HEAD

## 2025-03-16 ASSESSMENT — PAIN SCALES - GENERAL
PAINLEVEL_OUTOF10: 7
PAINLEVEL_OUTOF10: 6

## 2025-03-16 NOTE — H&P
revealed: Mild bilateral basilar airspace disease. Cardiomegaly   -Influenza A positive in ED  -Influenza B and COVID-19 negative  -DUONEB given in ED  -tamiflu given in ED and continued 3/15/2025  -PRN nebulizer and inhaler  -flonase  -oxygen therapy   -droplet isolation    Morbid obesity  With Body mass index is 34.1 kg/m². Complicating assessment and treatment. Placing patient at risk for multiple co-morbidities as well as early death and contributing to the patient's presentation. Counseled on weight loss    DM2, uncontrolled  -  -hemglobin A1c 2/3/25, 7.2%   -hold home metformin  -mdssi  -poct ac/hs  -hypoglycemia protocol  -carb control diet     Chronic combined systolic and diastolic CHF, stable  -does not appear to be in acute exacerbation at this time  -ProBNP 1,438  -strict I&O  -daily weights  -continue metoprolol, furosemide, Entresto, empagliflozin, spironolactone   ECHO 3/10/2025: Severely reduced left ventricular systolic function with a visually estimated EF of 25 - 30%. Left ventricle is mildly dilated. Mildly increased wall thickness. Severe diffuse hypokinesis with regional variations. Grade II diastolic dysfunction with increased LAP     SINDY  -CPAP ordered    PAF  -Rate/rhythm controlled in ED  -Continue apixaban, amiodarone, metoprolol    Anxiety depression  -mood appears stable at this time  -continue home medications    Discussed management of the case in detail w/ the Emergency Department Provider : ELENO Hooper    CXR: The chest Xray reviewed by me in the absence of a radiology shows: Mild bilateral basilar airspace disease. Cardiomegaly     EKG:  The Ekg interpreted by me in the absence of a cardiologist shows: N/A    Physical Exam Performed:      BP (!) 135/58   Pulse 72   Temp 98.4 °F (36.9 °C) (Oral)   Resp 29   Ht 1.778 m (5' 10\")   Wt 134.3 kg (296 lb)   SpO2 91%   BMI 42.47 kg/m²     General appearance:  Pleasant, obese male in no apparent distress, appears stated age and

## 2025-03-16 NOTE — CONSULTS
3/15/2025  Interstitial changes at the base bilaterally.  No obvious consolidations.        Assessment/Plan   Patient is a 59-year-old male with significant past medical history of severe SINDY that presents to [facility] for shortness of breath     Assessment:  Influenza A  Acute Hypoxic Respiratory Failure  Severe SINDY  Mild COPD     Plan:  - Supplemental O2 therapy with goal saturation 88-92%, he is on RA  - Finished recent dose of Prednisone  - Tamiflu for Influenza A, cont supportive care  - cont CPAP therapy nightly for severe SINDY  - supplemental O2 therapy with goal saturation 88-92%, he is on RA  - Cont Pulmicort nebs and provide on discharge    Pt on RA, cont supportive care for Influenza A and Tamiflu. No additional recommendations, Pulmonary will sign off, please call with any questions/concerns.      Dinesh Pena MD, FCCP    11:54 AM

## 2025-03-16 NOTE — ED NOTES
Dinesh Partida is a 59 y.o. male admitted for  Principal Problem:    Acute respiratory failure with hypoxia (HCC)  Resolved Problems:    * No resolved hospital problems. *  .   Patient Home via EMS transportation with   Chief Complaint   Patient presents with    Chest Pain     Started 2 hours ago. Asa 324 mg per EMS   .  Patient is alert and Person, Place, Time, and Situation  Patient's baseline mobility: Baseline Mobility: Independent   Code Status: Prior   Cardiac Rhythm:    O2 Flow Rate (L/min): 2 L/min  Is patient on baseline Oxygen: Yes, usually only at night 2LNC how many Liters2:   Abnormal Assessment Findings: Labored resps with exertion, FLU A+ today    Isolation: None      NIH Score:    C-SSRS: Risk of Suicide: No Risk  Bedside swallow:        Active LDA's:   Peripheral IV 03/15/25 Left Antecubital (Active)   Site Assessment Clean, dry & intact 03/15/25 1644   Line Status Brisk blood return;Flushed;Normal saline locked;Specimen collected 03/15/25 1644   Phlebitis Assessment No symptoms 03/15/25 1644   Infiltration Assessment 0 03/15/25 1644   Dressing Status New dressing applied 03/15/25 1644   Dressing Type Transparent 03/15/25 1644   Dressing Intervention New 03/15/25 1644           Family/Caregiver Present yes Any Concerns: no   Restraints no  Sitter no         Vitals: MEWS Score: 1    Vitals:    03/15/25 1920 03/15/25 2005 03/15/25 2035 03/15/25 2050   BP: (!) 119/57 128/73 (!) 121/53 (!) 135/58   Pulse: 74 74 73 72   Resp: 17 19 26 29   Temp:       TempSrc:       SpO2: 92% 93% 92% 91%   Weight:       Height:           Last documented pain score (0-10 scale) Pain Level: 0  Pain medication administered No.    Pertinent or High Risk Medications/Drips: No.    Pending Blood Product Administration: no    Abnormal labs:   Abnormal Labs Reviewed   COVID-19 & INFLUENZA COMBO - Abnormal; Notable for the following components:       Result Value    Influenza A DETECTED (*)     All other components within

## 2025-03-16 NOTE — RT PROTOCOL NOTE
RT Inhaler-Nebulizer Bronchodilator Protocol Note    There is a bronchodilator order in the chart from a provider indicating to follow the RT Bronchodilator Protocol and there is an “Initiate RT Inhaler-Nebulizer Bronchodilator Protocol” order as well (see protocol at bottom of note).    CXR Findings:  XR CHEST PORTABLE  Result Date: 3/15/2025  1. Mild bilateral basilar airspace disease. 2. Cardiomegaly. Electronically signed by Bruce Foley MD      The findings from the last RT Protocol Assessment were as follows:   History Pulmonary Disease: Chronic pulmonary disease  Respiratory Pattern: Regular pattern and RR 12-20 bpm  Breath Sounds: Intermittent or unilateral wheezes  Cough: Strong, spontaneous, non-productive  Indication for Bronchodilator Therapy: On home bronchodilators  Bronchodilator Assessment Score: 6    Aerosolized bronchodilator medication orders have been revised according to the RT Inhaler-Nebulizer Bronchodilator Protocol below.    Respiratory Therapist to perform RT Therapy Protocol Assessment initially then follow the protocol.  Repeat RT Therapy Protocol Assessment PRN for score 0-3 or on second treatment, BID, and PRN for scores above 3.    No Indications - adjust the frequency to every 6 hours PRN wheezing or bronchospasm, if no treatments needed after 48 hours then discontinue using Per Protocol order mode.     If indication present, adjust the RT bronchodilator orders based on the Bronchodilator Assessment Score as indicated below.  Use Inhaler orders unless patient has one or more of the following: on home nebulizer, not able to hold breath for 10 seconds, is not alert and oriented, cannot activate and use MDI correctly, or respiratory rate 25 breaths per minute or more, then use the equivalent nebulizer order(s) with same Frequency and PRN reasons based on the score.  If a patient is on this medication at home then do not decrease Frequency below that used at home.    0-3 - enter or

## 2025-03-16 NOTE — PLAN OF CARE
Problem: Chronic Conditions and Co-morbidities  Goal: Patient's chronic conditions and co-morbidity symptoms are monitored and maintained or improved  Outcome: Progressing  Flowsheets (Taken 3/16/2025 1023)  Care Plan - Patient's Chronic Conditions and Co-Morbidity Symptoms are Monitored and Maintained or Improved: Monitor and assess patient's chronic conditions and comorbid symptoms for stability, deterioration, or improvement     Problem: Pain  Goal: Verbalizes/displays adequate comfort level or baseline comfort level  3/16/2025 1023 by Elisa Barahona RN  Outcome: Progressing  Flowsheets (Taken 3/16/2025 1023)  Verbalizes/displays adequate comfort level or baseline comfort level:   Encourage patient to monitor pain and request assistance   Assess pain using appropriate pain scale   Administer analgesics based on type and severity of pain and evaluate response     Problem: Safety - Adult  Goal: Free from fall injury  3/16/2025 1023 by Elisa Barahona RN  Outcome: Progressing  Flowsheets (Taken 3/16/2025 1023)  Free From Fall Injury:   Instruct family/caregiver on patient safety   Based on caregiver fall risk screen, instruct family/caregiver to ask for assistance with transferring infant if caregiver noted to have fall risk factors     Problem: ABCDS Injury Assessment  Goal: Absence of physical injury  Outcome: Progressing  Flowsheets (Taken 3/16/2025 1023)  Absence of Physical Injury: Implement safety measures based on patient assessment     Problem: Respiratory - Adult  Goal: Achieves optimal ventilation and oxygenation  3/16/2025 1023 by Elisa Barahona RN  Outcome: Progressing  Flowsheets (Taken 3/16/2025 1023)  Achieves optimal ventilation and oxygenation:   Assess for changes in respiratory status   Assess for changes in mentation and behavior

## 2025-03-17 LAB
EKG ATRIAL RATE: 70 BPM
EKG DIAGNOSIS: NORMAL
EKG P AXIS: -16 DEGREES
EKG P-R INTERVAL: 276 MS
EKG Q-T INTERVAL: 454 MS
EKG QRS DURATION: 148 MS
EKG QTC CALCULATION (BAZETT): 490 MS
EKG R AXIS: 87 DEGREES
EKG T AXIS: -61 DEGREES
EKG VENTRICULAR RATE: 70 BPM
GLUCOSE BLD-MCNC: 113 MG/DL (ref 70–99)
GLUCOSE BLD-MCNC: 114 MG/DL (ref 70–99)
GLUCOSE BLD-MCNC: 117 MG/DL (ref 70–99)
GLUCOSE BLD-MCNC: 127 MG/DL (ref 70–99)
GLUCOSE BLD-MCNC: 148 MG/DL (ref 70–99)
PERFORMED ON: ABNORMAL
TROPONIN, HIGH SENSITIVITY: 26 NG/L (ref 0–22)

## 2025-03-17 PROCEDURE — 2500000003 HC RX 250 WO HCPCS: Performed by: NURSE PRACTITIONER

## 2025-03-17 PROCEDURE — 94640 AIRWAY INHALATION TREATMENT: CPT

## 2025-03-17 PROCEDURE — 93005 ELECTROCARDIOGRAM TRACING: CPT | Performed by: INTERNAL MEDICINE

## 2025-03-17 PROCEDURE — 2700000000 HC OXYGEN THERAPY PER DAY

## 2025-03-17 PROCEDURE — 6360000002 HC RX W HCPCS: Performed by: NURSE PRACTITIONER

## 2025-03-17 PROCEDURE — 84484 ASSAY OF TROPONIN QUANT: CPT

## 2025-03-17 PROCEDURE — 36415 COLL VENOUS BLD VENIPUNCTURE: CPT

## 2025-03-17 PROCEDURE — 93010 ELECTROCARDIOGRAM REPORT: CPT | Performed by: INTERNAL MEDICINE

## 2025-03-17 PROCEDURE — 94660 CPAP INITIATION&MGMT: CPT

## 2025-03-17 PROCEDURE — 6370000000 HC RX 637 (ALT 250 FOR IP): Performed by: NURSE PRACTITIONER

## 2025-03-17 PROCEDURE — 1200000000 HC SEMI PRIVATE

## 2025-03-17 PROCEDURE — 94761 N-INVAS EAR/PLS OXIMETRY MLT: CPT

## 2025-03-17 RX ADMIN — AMIODARONE HYDROCHLORIDE 400 MG: 200 TABLET ORAL at 10:19

## 2025-03-17 RX ADMIN — BUDESONIDE 500 MCG: 0.5 SUSPENSION RESPIRATORY (INHALATION) at 07:41

## 2025-03-17 RX ADMIN — AMIODARONE HYDROCHLORIDE 400 MG: 200 TABLET ORAL at 20:45

## 2025-03-17 RX ADMIN — SODIUM CHLORIDE, PRESERVATIVE FREE 10 ML: 5 INJECTION INTRAVENOUS at 10:36

## 2025-03-17 RX ADMIN — SACUBITRIL AND VALSARTAN 0.5 TABLET: 24; 26 TABLET, FILM COATED ORAL at 20:45

## 2025-03-17 RX ADMIN — FUROSEMIDE 20 MG: 20 TABLET ORAL at 10:19

## 2025-03-17 RX ADMIN — METOPROLOL SUCCINATE 50 MG: 50 TABLET, EXTENDED RELEASE ORAL at 10:18

## 2025-03-17 RX ADMIN — SODIUM CHLORIDE, PRESERVATIVE FREE 10 ML: 5 INJECTION INTRAVENOUS at 20:47

## 2025-03-17 RX ADMIN — SACUBITRIL AND VALSARTAN 0.5 TABLET: 24; 26 TABLET, FILM COATED ORAL at 10:18

## 2025-03-17 RX ADMIN — TRAMADOL HYDROCHLORIDE 50 MG: 50 TABLET, COATED ORAL at 10:22

## 2025-03-17 RX ADMIN — OSELTAMIVIR PHOSPHATE 30 MG: 30 CAPSULE ORAL at 21:43

## 2025-03-17 RX ADMIN — MONTELUKAST 10 MG: 10 TABLET, FILM COATED ORAL at 20:45

## 2025-03-17 RX ADMIN — OSELTAMIVIR PHOSPHATE 30 MG: 30 CAPSULE ORAL at 10:19

## 2025-03-17 RX ADMIN — PAROXETINE HYDROCHLORIDE 20 MG: 20 TABLET, FILM COATED ORAL at 10:18

## 2025-03-17 RX ADMIN — APIXABAN 5 MG: 5 TABLET, FILM COATED ORAL at 10:19

## 2025-03-17 RX ADMIN — BUDESONIDE 500 MCG: 0.5 SUSPENSION RESPIRATORY (INHALATION) at 19:37

## 2025-03-17 ASSESSMENT — PAIN DESCRIPTION - DESCRIPTORS
DESCRIPTORS: ACHING

## 2025-03-17 ASSESSMENT — PAIN DESCRIPTION - ORIENTATION: ORIENTATION: ANTERIOR;POSTERIOR;LOWER

## 2025-03-17 ASSESSMENT — PAIN - FUNCTIONAL ASSESSMENT: PAIN_FUNCTIONAL_ASSESSMENT: ACTIVITIES ARE NOT PREVENTED

## 2025-03-17 ASSESSMENT — PAIN DESCRIPTION - LOCATION
LOCATION: HEAD
LOCATION: HEAD;BACK

## 2025-03-17 ASSESSMENT — PAIN DESCRIPTION - PAIN TYPE: TYPE: ACUTE PAIN

## 2025-03-17 ASSESSMENT — PAIN SCALES - GENERAL
PAINLEVEL_OUTOF10: 6
PAINLEVEL_OUTOF10: 7
PAINLEVEL_OUTOF10: 6

## 2025-03-17 NOTE — PLAN OF CARE
Problem: Pain  Goal: Verbalizes/displays adequate comfort level or baseline comfort level  3/16/2025 2143 by Rea Handy RN  Outcome: Progressing  3/16/2025 1023 by Elisa Barahona RN  Outcome: Progressing  Flowsheets (Taken 3/16/2025 1023)  Verbalizes/displays adequate comfort level or baseline comfort level:   Encourage patient to monitor pain and request assistance   Assess pain using appropriate pain scale   Administer analgesics based on type and severity of pain and evaluate response     Problem: Safety - Adult  Goal: Free from fall injury  3/16/2025 2143 by Rea Handy RN  Outcome: Progressing  3/16/2025 1023 by Elisa Barahona RN  Outcome: Progressing  Flowsheets (Taken 3/16/2025 1023)  Free From Fall Injury:   Instruct family/caregiver on patient safety   Based on caregiver fall risk screen, instruct family/caregiver to ask for assistance with transferring infant if caregiver noted to have fall risk factors     Problem: Respiratory - Adult  Goal: Achieves optimal ventilation and oxygenation  3/16/2025 2143 by Rea Handy RN  Outcome: Progressing  3/16/2025 1023 by Elisa Barahona RN  Outcome: Progressing  Flowsheets (Taken 3/16/2025 1023)  Achieves optimal ventilation and oxygenation:   Assess for changes in respiratory status   Assess for changes in mentation and behavior

## 2025-03-17 NOTE — ACP (ADVANCE CARE PLANNING)
Advance Care Planning     General Advance Care Planning (ACP) Conversation    Date of Conversation: 3/17/2025  Conducted with: Patient with Decision Making Capacity  Other persons present: None    Healthcare Decision Maker:   Primary Decision Maker: ELENITA SUAREZ - Other - [phone]    Secondary Decision Maker: Julián Partida - Other Relative - [phone]       Content/Action Overview:  Has ACP document(s) on file - reflects the patient's care preferences  Reviewed DNR/DNI and patient elects Full Code (Attempt Resuscitation)      Length of Voluntary ACP Conversation in minutes:  <16 minutes (Non-Billable)    Kevin Peña RN

## 2025-03-17 NOTE — CARE COORDINATION
Case Management Assessment  Initial Evaluation    Date/Time of Evaluation: 3/17/2025 4:35 PM  Assessment Completed by: Kevin Peña RN    If patient is discharged prior to next notation, then this note serves as note for discharge by case management.    Patient Name: Dinesh Partida                   YOB: 1965  Diagnosis: Influenza A [J10.1]  Acute respiratory failure with hypoxia (HCC) [J96.01]                   Date / Time: 3/15/2025  4:28 PM    Patient Admission Status: Inpatient   Readmission Risk (Low < 19, Mod (19-27), High > 27): Readmission Risk Score: 21.9    Current PCP: Zee aPtel MD  PCP verified by CM? Yes    Chart Reviewed: Yes      History Provided by: Patient  Patient Orientation: Alert and Oriented, Person, Place, Situation, Self    Patient Cognition: Alert    Hospitalization in the last 30 days (Readmission):  No    If yes, Readmission Assessment in CM Navigator will be completed.    Advance Directives:      Code Status: Full Code   Patient's Primary Decision Maker is: Patient Declined (Legal Next of Kin Remains as Decision Maker)    Primary Decision Maker: ELENITA SUAREZ - Other - [phone]    Secondary Decision Maker: Julián Partida - Other Relative - [phone]    Discharge Planning:    Patient lives with: Spouse/Significant Other Type of Home: House  Primary Care Giver: Self  Patient Support Systems include: Spouse/Significant Other   Current Financial resources: Medicare  Current community resources: None  Current services prior to admission: C-pap            Current DME:              Type of Home Care services:  None    ADLS  Prior functional level: Independent in ADLs/IADLs  Current functional level: Independent in ADLs/IADLs    PT AM-PAC:   /24  OT AM-PAC:   /24    Family can provide assistance at DC:    Would you like Case Management to discuss the discharge plan with any other family members/significant others, and if so, who? No  Plans to Return to Present

## 2025-03-18 LAB
ANION GAP SERPL CALCULATED.3IONS-SCNC: 17 MMOL/L (ref 3–16)
BASOPHILS # BLD: 0 K/UL (ref 0–0.2)
BASOPHILS # BLD: 0.3 K/UL (ref 0–0.2)
BASOPHILS NFR BLD: 1.2 %
BASOPHILS NFR BLD: 6.7 %
BUN SERPL-MCNC: 43 MG/DL (ref 7–20)
CALCIUM SERPL-MCNC: 8.8 MG/DL (ref 8.3–10.6)
CHLORIDE SERPL-SCNC: 99 MMOL/L (ref 99–110)
CO2 SERPL-SCNC: 17 MMOL/L (ref 21–32)
CREAT SERPL-MCNC: 1.8 MG/DL (ref 0.8–1.3)
DEPRECATED RDW RBC AUTO: 16.9 % (ref 12.4–15.4)
DEPRECATED RDW RBC AUTO: 16.9 % (ref 12.4–15.4)
EOSINOPHIL # BLD: 0 K/UL (ref 0–0.6)
EOSINOPHIL # BLD: 0.1 K/UL (ref 0–0.6)
EOSINOPHIL NFR BLD: 0.7 %
EOSINOPHIL NFR BLD: 1.4 %
GFR SERPLBLD CREATININE-BSD FMLA CKD-EPI: 42 ML/MIN/{1.73_M2}
GLUCOSE BLD-MCNC: 115 MG/DL (ref 70–99)
GLUCOSE BLD-MCNC: 135 MG/DL (ref 70–99)
GLUCOSE BLD-MCNC: 139 MG/DL (ref 70–99)
GLUCOSE BLD-MCNC: 156 MG/DL (ref 70–99)
GLUCOSE SERPL-MCNC: 122 MG/DL (ref 70–99)
HCT VFR BLD AUTO: 48.3 % (ref 40.5–52.5)
HCT VFR BLD AUTO: 48.4 % (ref 40.5–52.5)
HGB BLD-MCNC: 15.9 G/DL (ref 13.5–17.5)
HGB BLD-MCNC: 16 G/DL (ref 13.5–17.5)
LYMPHOCYTES # BLD: 0.6 K/UL (ref 1–5.1)
LYMPHOCYTES # BLD: 1.1 K/UL (ref 1–5.1)
LYMPHOCYTES NFR BLD: 15.1 %
LYMPHOCYTES NFR BLD: 28.7 %
MCH RBC QN AUTO: 28.9 PG (ref 26–34)
MCH RBC QN AUTO: 29 PG (ref 26–34)
MCHC RBC AUTO-ENTMCNC: 32.9 G/DL (ref 31–36)
MCHC RBC AUTO-ENTMCNC: 33 G/DL (ref 31–36)
MCV RBC AUTO: 88 FL (ref 80–100)
MCV RBC AUTO: 88 FL (ref 80–100)
MONOCYTES # BLD: 0.5 K/UL (ref 0–1.3)
MONOCYTES # BLD: 0.6 K/UL (ref 0–1.3)
MONOCYTES NFR BLD: 12.2 %
MONOCYTES NFR BLD: 13.9 %
NEUTROPHILS # BLD: 2.2 K/UL (ref 1.7–7.7)
NEUTROPHILS # BLD: 2.7 K/UL (ref 1.7–7.7)
NEUTROPHILS NFR BLD: 57.2 %
NEUTROPHILS NFR BLD: 62.9 %
NT-PROBNP SERPL-MCNC: 403 PG/ML (ref 0–124)
PERFORMED ON: ABNORMAL
PLATELET # BLD AUTO: 117 K/UL (ref 135–450)
PLATELET # BLD AUTO: 120 K/UL (ref 135–450)
PMV BLD AUTO: 8.2 FL (ref 5–10.5)
PMV BLD AUTO: 8.8 FL (ref 5–10.5)
POTASSIUM SERPL-SCNC: 3.9 MMOL/L (ref 3.5–5.1)
RBC # BLD AUTO: 5.5 M/UL (ref 4.2–5.9)
RBC # BLD AUTO: 5.5 M/UL (ref 4.2–5.9)
SODIUM SERPL-SCNC: 133 MMOL/L (ref 136–145)
WBC # BLD AUTO: 3.8 K/UL (ref 4–11)
WBC # BLD AUTO: 4.2 K/UL (ref 4–11)

## 2025-03-18 PROCEDURE — 36415 COLL VENOUS BLD VENIPUNCTURE: CPT

## 2025-03-18 PROCEDURE — 1200000000 HC SEMI PRIVATE

## 2025-03-18 PROCEDURE — 6370000000 HC RX 637 (ALT 250 FOR IP): Performed by: INTERNAL MEDICINE

## 2025-03-18 PROCEDURE — 94640 AIRWAY INHALATION TREATMENT: CPT

## 2025-03-18 PROCEDURE — 2500000003 HC RX 250 WO HCPCS: Performed by: NURSE PRACTITIONER

## 2025-03-18 PROCEDURE — 2700000000 HC OXYGEN THERAPY PER DAY

## 2025-03-18 PROCEDURE — 85025 COMPLETE CBC W/AUTO DIFF WBC: CPT

## 2025-03-18 PROCEDURE — 6360000002 HC RX W HCPCS: Performed by: NURSE PRACTITIONER

## 2025-03-18 PROCEDURE — 80048 BASIC METABOLIC PNL TOTAL CA: CPT

## 2025-03-18 PROCEDURE — 94660 CPAP INITIATION&MGMT: CPT

## 2025-03-18 PROCEDURE — 94761 N-INVAS EAR/PLS OXIMETRY MLT: CPT

## 2025-03-18 PROCEDURE — 6370000000 HC RX 637 (ALT 250 FOR IP): Performed by: NURSE PRACTITIONER

## 2025-03-18 PROCEDURE — 83880 ASSAY OF NATRIURETIC PEPTIDE: CPT

## 2025-03-18 RX ORDER — AMIODARONE HYDROCHLORIDE 200 MG/1
200 TABLET ORAL 2 TIMES DAILY
Status: DISCONTINUED | OUTPATIENT
Start: 2025-03-18 | End: 2025-03-20 | Stop reason: HOSPADM

## 2025-03-18 RX ORDER — METOPROLOL SUCCINATE 25 MG/1
25 TABLET, EXTENDED RELEASE ORAL DAILY
Status: DISCONTINUED | OUTPATIENT
Start: 2025-03-19 | End: 2025-03-20 | Stop reason: HOSPADM

## 2025-03-18 RX ORDER — SODIUM BICARBONATE 650 MG/1
650 TABLET ORAL 3 TIMES DAILY
Status: DISCONTINUED | OUTPATIENT
Start: 2025-03-18 | End: 2025-03-20 | Stop reason: HOSPADM

## 2025-03-18 RX ORDER — BUTALBITAL, ACETAMINOPHEN AND CAFFEINE 50; 325; 40 MG/1; MG/1; MG/1
1 TABLET ORAL EVERY 8 HOURS PRN
Status: DISCONTINUED | OUTPATIENT
Start: 2025-03-18 | End: 2025-03-20 | Stop reason: HOSPADM

## 2025-03-18 RX ADMIN — SODIUM CHLORIDE, PRESERVATIVE FREE 10 ML: 5 INJECTION INTRAVENOUS at 20:07

## 2025-03-18 RX ADMIN — SACUBITRIL AND VALSARTAN 0.5 TABLET: 24; 26 TABLET, FILM COATED ORAL at 08:55

## 2025-03-18 RX ADMIN — FUROSEMIDE 20 MG: 20 TABLET ORAL at 08:55

## 2025-03-18 RX ADMIN — OSELTAMIVIR PHOSPHATE 30 MG: 30 CAPSULE ORAL at 21:37

## 2025-03-18 RX ADMIN — AMIODARONE HYDROCHLORIDE 400 MG: 200 TABLET ORAL at 08:55

## 2025-03-18 RX ADMIN — BUTALBITAL, ACETAMINOPHEN AND CAFFEINE 1 TABLET: 325; 50; 40 TABLET ORAL at 21:39

## 2025-03-18 RX ADMIN — APIXABAN 5 MG: 5 TABLET, FILM COATED ORAL at 21:37

## 2025-03-18 RX ADMIN — SODIUM BICARBONATE 650 MG: 650 TABLET ORAL at 15:15

## 2025-03-18 RX ADMIN — SACUBITRIL AND VALSARTAN 0.5 TABLET: 24; 26 TABLET, FILM COATED ORAL at 20:06

## 2025-03-18 RX ADMIN — MONTELUKAST 10 MG: 10 TABLET, FILM COATED ORAL at 20:06

## 2025-03-18 RX ADMIN — BUDESONIDE 500 MCG: 0.5 SUSPENSION RESPIRATORY (INHALATION) at 19:39

## 2025-03-18 RX ADMIN — BUTALBITAL, ACETAMINOPHEN AND CAFFEINE 1 TABLET: 325; 50; 40 TABLET ORAL at 15:11

## 2025-03-18 RX ADMIN — OSELTAMIVIR PHOSPHATE 30 MG: 30 CAPSULE ORAL at 08:55

## 2025-03-18 RX ADMIN — SODIUM CHLORIDE, PRESERVATIVE FREE 10 ML: 5 INJECTION INTRAVENOUS at 08:56

## 2025-03-18 RX ADMIN — AMIODARONE HYDROCHLORIDE 200 MG: 200 TABLET ORAL at 20:05

## 2025-03-18 RX ADMIN — TRAMADOL HYDROCHLORIDE 50 MG: 50 TABLET, COATED ORAL at 10:28

## 2025-03-18 RX ADMIN — SODIUM BICARBONATE 650 MG: 650 TABLET ORAL at 20:05

## 2025-03-18 RX ADMIN — PAROXETINE HYDROCHLORIDE 20 MG: 20 TABLET, FILM COATED ORAL at 08:55

## 2025-03-18 RX ADMIN — APIXABAN 5 MG: 5 TABLET, FILM COATED ORAL at 10:25

## 2025-03-18 ASSESSMENT — PAIN DESCRIPTION - DESCRIPTORS
DESCRIPTORS: SHARP
DESCRIPTORS: ACHING
DESCRIPTORS: SHARP

## 2025-03-18 ASSESSMENT — PAIN DESCRIPTION - LOCATION
LOCATION: HEAD
LOCATION: HEAD
LOCATION: BACK;HEAD
LOCATION: HEAD
LOCATION: HEAD

## 2025-03-18 ASSESSMENT — PAIN - FUNCTIONAL ASSESSMENT
PAIN_FUNCTIONAL_ASSESSMENT: ACTIVITIES ARE NOT PREVENTED
PAIN_FUNCTIONAL_ASSESSMENT: ACTIVITIES ARE NOT PREVENTED

## 2025-03-18 ASSESSMENT — PAIN DESCRIPTION - ORIENTATION
ORIENTATION: ANTERIOR
ORIENTATION: ANTERIOR

## 2025-03-18 ASSESSMENT — PAIN SCALES - GENERAL
PAINLEVEL_OUTOF10: 6
PAINLEVEL_OUTOF10: 6
PAINLEVEL_OUTOF10: 4
PAINLEVEL_OUTOF10: 4
PAINLEVEL_OUTOF10: 7
PAINLEVEL_OUTOF10: 7
PAINLEVEL_OUTOF10: 5

## 2025-03-18 ASSESSMENT — PAIN DESCRIPTION - PAIN TYPE: TYPE: ACUTE PAIN

## 2025-03-18 NOTE — PLAN OF CARE
Problem: Chronic Conditions and Co-morbidities  Goal: Patient's chronic conditions and co-morbidity symptoms are monitored and maintained or improved  Flowsheets (Taken 3/18/2025 0059)  Care Plan - Patient's Chronic Conditions and Co-Morbidity Symptoms are Monitored and Maintained or Improved:   Monitor and assess patient's chronic conditions and comorbid symptoms for stability, deterioration, or improvement   Collaborate with multidisciplinary team to address chronic and comorbid conditions and prevent exacerbation or deterioration   Update acute care plan with appropriate goals if chronic or comorbid symptoms are exacerbated and prevent overall improvement and discharge

## 2025-03-18 NOTE — PROGRESS NOTES
Physician Progress Note      PATIENT:               NICHOLE ROSA  CSN #:                  [id number]  :                       1965  ADMIT DATE:       3/7/2025 7:49 PM  DISCH DATE:        3/13/2025 8:40 PM  RESPONDING  PROVIDER #:        Sergio Macias MD          QUERY TEXT:    Documentation reflects PNA in note dated 3/11.  If possible, please document   in the progress notes and discharge summary if PNA was:    The medical record reflects the following:  Risk Factors: Recent PNA admission  Clinical Indicators: Per cardiology note, 3/11, \"SOB- PNA-continue Rx per   Primary team and pulmonary\"  CT chest on 3/7, \"Lungs demonstrate persistent consolidation in the right   middle lobe with interval improvement of posterior left upper lobe and   bilateral lower lobe consolidation.\"  Treatment: Doxycycline, pulm consult  Options provided:  -- Likely Gram positive PNA POA confirmed after study  -- PNA ruled out after study  -- Other - I will add my own diagnosis  -- Disagree - Not applicable / Not valid  -- Disagree - Clinically unable to determine / Unknown  -- Refer to Clinical Documentation Reviewer    PROVIDER RESPONSE TEXT:    Likely Gram positive PNA POA confirmed after study.    Query created by: Tiana Gonzalez on 3/18/2025 5:49 PM      Electronically signed by:  Sergio Macias MD 3/18/2025 5:56 PM

## 2025-03-18 NOTE — PLAN OF CARE
Problem: Chronic Conditions and Co-morbidities  Goal: Patient's chronic conditions and co-morbidity symptoms are monitored and maintained or improved  3/18/2025 0908 by Lizzy Padilla RN  Outcome: Progressing  3/18/2025 0059 by Nina Wu, RN  Flowsheets (Taken 3/18/2025 0059)  Care Plan - Patient's Chronic Conditions and Co-Morbidity Symptoms are Monitored and Maintained or Improved:   Monitor and assess patient's chronic conditions and comorbid symptoms for stability, deterioration, or improvement   Collaborate with multidisciplinary team to address chronic and comorbid conditions and prevent exacerbation or deterioration   Update acute care plan with appropriate goals if chronic or comorbid symptoms are exacerbated and prevent overall improvement and discharge     Problem: Pain  Goal: Verbalizes/displays adequate comfort level or baseline comfort level  Outcome: Progressing     Problem: Safety - Adult  Goal: Free from fall injury  Outcome: Progressing     Problem: ABCDS Injury Assessment  Goal: Absence of physical injury  Outcome: Progressing     Problem: Respiratory - Adult  Goal: Achieves optimal ventilation and oxygenation  Outcome: Progressing

## 2025-03-19 ENCOUNTER — APPOINTMENT (OUTPATIENT)
Dept: GENERAL RADIOLOGY | Age: 60
DRG: 189 | End: 2025-03-19
Payer: MEDICARE

## 2025-03-19 LAB
ANION GAP SERPL CALCULATED.3IONS-SCNC: 17 MMOL/L (ref 3–16)
BASOPHILS # BLD: 0 K/UL (ref 0–0.2)
BASOPHILS NFR BLD: 1 %
BUN SERPL-MCNC: 33 MG/DL (ref 7–20)
CALCIUM SERPL-MCNC: 8.6 MG/DL (ref 8.3–10.6)
CHLORIDE SERPL-SCNC: 101 MMOL/L (ref 99–110)
CO2 SERPL-SCNC: 16 MMOL/L (ref 21–32)
CREAT SERPL-MCNC: 1.6 MG/DL (ref 0.8–1.3)
DEPRECATED RDW RBC AUTO: 17.5 % (ref 12.4–15.4)
EOSINOPHIL # BLD: 0 K/UL (ref 0–0.6)
EOSINOPHIL NFR BLD: 0.8 %
GFR SERPLBLD CREATININE-BSD FMLA CKD-EPI: 49 ML/MIN/{1.73_M2}
GLUCOSE BLD-MCNC: 113 MG/DL (ref 70–99)
GLUCOSE BLD-MCNC: 118 MG/DL (ref 70–99)
GLUCOSE BLD-MCNC: 127 MG/DL (ref 70–99)
GLUCOSE BLD-MCNC: 147 MG/DL (ref 70–99)
GLUCOSE SERPL-MCNC: 134 MG/DL (ref 70–99)
HCT VFR BLD AUTO: 49.9 % (ref 40.5–52.5)
HGB BLD-MCNC: 16.1 G/DL (ref 13.5–17.5)
LYMPHOCYTES # BLD: 1.1 K/UL (ref 1–5.1)
LYMPHOCYTES NFR BLD: 26.9 %
MCH RBC QN AUTO: 29.1 PG (ref 26–34)
MCHC RBC AUTO-ENTMCNC: 32.3 G/DL (ref 31–36)
MCV RBC AUTO: 90.1 FL (ref 80–100)
MONOCYTES # BLD: 0.5 K/UL (ref 0–1.3)
MONOCYTES NFR BLD: 13.1 %
NEUTROPHILS # BLD: 2.3 K/UL (ref 1.7–7.7)
NEUTROPHILS NFR BLD: 58.2 %
PERFORMED ON: ABNORMAL
PLATELET # BLD AUTO: 114 K/UL (ref 135–450)
PMV BLD AUTO: 8.4 FL (ref 5–10.5)
POTASSIUM SERPL-SCNC: 4.1 MMOL/L (ref 3.5–5.1)
PROCALCITONIN SERPL IA-MCNC: 0.12 NG/ML (ref 0–0.15)
RBC # BLD AUTO: 5.54 M/UL (ref 4.2–5.9)
SODIUM SERPL-SCNC: 134 MMOL/L (ref 136–145)
WBC # BLD AUTO: 4 K/UL (ref 4–11)

## 2025-03-19 PROCEDURE — 85025 COMPLETE CBC W/AUTO DIFF WBC: CPT

## 2025-03-19 PROCEDURE — 6370000000 HC RX 637 (ALT 250 FOR IP): Performed by: INTERNAL MEDICINE

## 2025-03-19 PROCEDURE — 2700000000 HC OXYGEN THERAPY PER DAY

## 2025-03-19 PROCEDURE — 6360000002 HC RX W HCPCS: Performed by: NURSE PRACTITIONER

## 2025-03-19 PROCEDURE — 97110 THERAPEUTIC EXERCISES: CPT

## 2025-03-19 PROCEDURE — 6370000000 HC RX 637 (ALT 250 FOR IP): Performed by: NURSE PRACTITIONER

## 2025-03-19 PROCEDURE — 80048 BASIC METABOLIC PNL TOTAL CA: CPT

## 2025-03-19 PROCEDURE — 1200000000 HC SEMI PRIVATE

## 2025-03-19 PROCEDURE — 2500000003 HC RX 250 WO HCPCS: Performed by: NURSE PRACTITIONER

## 2025-03-19 PROCEDURE — 94761 N-INVAS EAR/PLS OXIMETRY MLT: CPT

## 2025-03-19 PROCEDURE — 71046 X-RAY EXAM CHEST 2 VIEWS: CPT

## 2025-03-19 PROCEDURE — 99233 SBSQ HOSP IP/OBS HIGH 50: CPT | Performed by: INTERNAL MEDICINE

## 2025-03-19 PROCEDURE — 97535 SELF CARE MNGMENT TRAINING: CPT

## 2025-03-19 PROCEDURE — 36415 COLL VENOUS BLD VENIPUNCTURE: CPT

## 2025-03-19 PROCEDURE — 94660 CPAP INITIATION&MGMT: CPT

## 2025-03-19 PROCEDURE — 97162 PT EVAL MOD COMPLEX 30 MIN: CPT

## 2025-03-19 PROCEDURE — 84145 PROCALCITONIN (PCT): CPT

## 2025-03-19 PROCEDURE — 94640 AIRWAY INHALATION TREATMENT: CPT

## 2025-03-19 PROCEDURE — 97166 OT EVAL MOD COMPLEX 45 MIN: CPT

## 2025-03-19 RX ADMIN — METOPROLOL SUCCINATE 25 MG: 25 TABLET, EXTENDED RELEASE ORAL at 09:18

## 2025-03-19 RX ADMIN — SODIUM BICARBONATE 650 MG: 650 TABLET ORAL at 14:42

## 2025-03-19 RX ADMIN — AMIODARONE HYDROCHLORIDE 200 MG: 200 TABLET ORAL at 20:50

## 2025-03-19 RX ADMIN — OSELTAMIVIR PHOSPHATE 30 MG: 30 CAPSULE ORAL at 09:18

## 2025-03-19 RX ADMIN — SACUBITRIL AND VALSARTAN 0.5 TABLET: 24; 26 TABLET, FILM COATED ORAL at 09:18

## 2025-03-19 RX ADMIN — OSELTAMIVIR PHOSPHATE 30 MG: 30 CAPSULE ORAL at 20:50

## 2025-03-19 RX ADMIN — SODIUM BICARBONATE 650 MG: 650 TABLET ORAL at 09:19

## 2025-03-19 RX ADMIN — APIXABAN 5 MG: 5 TABLET, FILM COATED ORAL at 20:50

## 2025-03-19 RX ADMIN — APIXABAN 5 MG: 5 TABLET, FILM COATED ORAL at 09:18

## 2025-03-19 RX ADMIN — BUTALBITAL, ACETAMINOPHEN AND CAFFEINE 1 TABLET: 325; 50; 40 TABLET ORAL at 09:19

## 2025-03-19 RX ADMIN — MONTELUKAST 10 MG: 10 TABLET, FILM COATED ORAL at 20:50

## 2025-03-19 RX ADMIN — SODIUM CHLORIDE, PRESERVATIVE FREE 10 ML: 5 INJECTION INTRAVENOUS at 20:51

## 2025-03-19 RX ADMIN — BUDESONIDE 500 MCG: 0.5 SUSPENSION RESPIRATORY (INHALATION) at 08:05

## 2025-03-19 RX ADMIN — SODIUM CHLORIDE, PRESERVATIVE FREE 10 ML: 5 INJECTION INTRAVENOUS at 09:20

## 2025-03-19 RX ADMIN — BUDESONIDE 500 MCG: 0.5 SUSPENSION RESPIRATORY (INHALATION) at 20:56

## 2025-03-19 RX ADMIN — FUROSEMIDE 20 MG: 20 TABLET ORAL at 09:18

## 2025-03-19 RX ADMIN — SODIUM BICARBONATE 650 MG: 650 TABLET ORAL at 20:50

## 2025-03-19 RX ADMIN — PAROXETINE HYDROCHLORIDE 20 MG: 20 TABLET, FILM COATED ORAL at 09:19

## 2025-03-19 RX ADMIN — AMIODARONE HYDROCHLORIDE 200 MG: 200 TABLET ORAL at 09:18

## 2025-03-19 RX ADMIN — BUTALBITAL, ACETAMINOPHEN AND CAFFEINE 1 TABLET: 325; 50; 40 TABLET ORAL at 17:09

## 2025-03-19 RX ADMIN — SACUBITRIL AND VALSARTAN 0.5 TABLET: 24; 26 TABLET, FILM COATED ORAL at 20:50

## 2025-03-19 ASSESSMENT — PAIN SCALES - GENERAL
PAINLEVEL_OUTOF10: 6
PAINLEVEL_OUTOF10: 6
PAINLEVEL_OUTOF10: 5
PAINLEVEL_OUTOF10: 7
PAINLEVEL_OUTOF10: 6

## 2025-03-19 ASSESSMENT — PAIN DESCRIPTION - LOCATION
LOCATION: HEAD

## 2025-03-19 ASSESSMENT — PAIN DESCRIPTION - DESCRIPTORS
DESCRIPTORS: ACHING
DESCRIPTORS: ACHING
DESCRIPTORS: ACHING;SHARP

## 2025-03-19 NOTE — CARE COORDINATION
Chart reviewed day 4. Care managed by IM. O2 3LNC with sat of 91%. Following for new needs at d/c. Therapy input pending. From home with HIMANSHU. Kevin Peña RN    D/c order noted spoke with Lizzy MASON. Concerns voiced to MD. D/c order removed, will watch another day. Following for possible new O2 needs.

## 2025-03-19 NOTE — PLAN OF CARE
Problem: Chronic Conditions and Co-morbidities  Goal: Patient's chronic conditions and co-morbidity symptoms are monitored and maintained or improved  Outcome: Progressing  Flowsheets (Taken 3/18/2025 0059 by Nina Wu, RN)  Care Plan - Patient's Chronic Conditions and Co-Morbidity Symptoms are Monitored and Maintained or Improved:   Monitor and assess patient's chronic conditions and comorbid symptoms for stability, deterioration, or improvement   Collaborate with multidisciplinary team to address chronic and comorbid conditions and prevent exacerbation or deterioration   Update acute care plan with appropriate goals if chronic or comorbid symptoms are exacerbated and prevent overall improvement and discharge  Note: Encourage pt to get up to chair today to prevent deterioration in lungs      Problem: Pain  Goal: Verbalizes/displays adequate comfort level or baseline comfort level  Outcome: Progressing  Note: Been taking fioricet for headache     Problem: Safety - Adult  Goal: Free from fall injury  Outcome: Progressing  Flowsheets (Taken 3/16/2025 1023 by Elisa Barahona, RN)  Free From Fall Injury:   Instruct family/caregiver on patient safety   Based on caregiver fall risk screen, instruct family/caregiver to ask for assistance with transferring infant if caregiver noted to have fall risk factors  Note: Pt to work with PT/OT today     Problem: Respiratory - Adult  Goal: Achieves optimal ventilation and oxygenation  Outcome: Progressing  Flowsheets (Taken 3/16/2025 1023 by Elisa Barahona RN)  Achieves optimal ventilation and oxygenation:   Assess for changes in respiratory status   Assess for changes in mentation and behavior

## 2025-03-20 VITALS
WEIGHT: 296.9 LBS | RESPIRATION RATE: 16 BRPM | HEART RATE: 70 BPM | HEIGHT: 70 IN | BODY MASS INDEX: 42.5 KG/M2 | OXYGEN SATURATION: 93 % | TEMPERATURE: 98.2 F | DIASTOLIC BLOOD PRESSURE: 63 MMHG | SYSTOLIC BLOOD PRESSURE: 110 MMHG

## 2025-03-20 LAB
GLUCOSE BLD-MCNC: 119 MG/DL (ref 70–99)
GLUCOSE BLD-MCNC: 163 MG/DL (ref 70–99)
PERFORMED ON: ABNORMAL
PERFORMED ON: ABNORMAL

## 2025-03-20 PROCEDURE — 94761 N-INVAS EAR/PLS OXIMETRY MLT: CPT

## 2025-03-20 PROCEDURE — 94660 CPAP INITIATION&MGMT: CPT

## 2025-03-20 PROCEDURE — 99232 SBSQ HOSP IP/OBS MODERATE 35: CPT | Performed by: INTERNAL MEDICINE

## 2025-03-20 PROCEDURE — 2700000000 HC OXYGEN THERAPY PER DAY

## 2025-03-20 PROCEDURE — 6370000000 HC RX 637 (ALT 250 FOR IP): Performed by: NURSE PRACTITIONER

## 2025-03-20 PROCEDURE — 94640 AIRWAY INHALATION TREATMENT: CPT

## 2025-03-20 PROCEDURE — 6370000000 HC RX 637 (ALT 250 FOR IP): Performed by: INTERNAL MEDICINE

## 2025-03-20 PROCEDURE — 6360000002 HC RX W HCPCS: Performed by: NURSE PRACTITIONER

## 2025-03-20 PROCEDURE — 2500000003 HC RX 250 WO HCPCS: Performed by: NURSE PRACTITIONER

## 2025-03-20 RX ORDER — OSELTAMIVIR PHOSPHATE 75 MG/1
75 CAPSULE ORAL 2 TIMES DAILY
Status: COMPLETED | OUTPATIENT
Start: 2025-03-20 | End: 2025-03-20

## 2025-03-20 RX ORDER — AMIODARONE HYDROCHLORIDE 200 MG/1
200 TABLET ORAL 2 TIMES DAILY
Qty: 2 TABLET | Refills: 0
Start: 2025-03-20

## 2025-03-20 RX ORDER — SODIUM BICARBONATE 650 MG/1
650 TABLET ORAL 3 TIMES DAILY
Qty: 45 TABLET | Refills: 0 | Status: SHIPPED | OUTPATIENT
Start: 2025-03-20

## 2025-03-20 RX ADMIN — FUROSEMIDE 20 MG: 20 TABLET ORAL at 09:41

## 2025-03-20 RX ADMIN — SACUBITRIL AND VALSARTAN 0.5 TABLET: 24; 26 TABLET, FILM COATED ORAL at 09:41

## 2025-03-20 RX ADMIN — AMIODARONE HYDROCHLORIDE 200 MG: 200 TABLET ORAL at 09:41

## 2025-03-20 RX ADMIN — METOPROLOL SUCCINATE 25 MG: 25 TABLET, EXTENDED RELEASE ORAL at 09:41

## 2025-03-20 RX ADMIN — OSELTAMIVIR 75 MG: 75 CAPSULE ORAL at 09:40

## 2025-03-20 RX ADMIN — BUDESONIDE 500 MCG: 0.5 SUSPENSION RESPIRATORY (INHALATION) at 08:15

## 2025-03-20 RX ADMIN — APIXABAN 5 MG: 5 TABLET, FILM COATED ORAL at 09:40

## 2025-03-20 RX ADMIN — BUTALBITAL, ACETAMINOPHEN AND CAFFEINE 1 TABLET: 325; 50; 40 TABLET ORAL at 09:40

## 2025-03-20 RX ADMIN — SODIUM BICARBONATE 650 MG: 650 TABLET ORAL at 09:40

## 2025-03-20 RX ADMIN — SODIUM BICARBONATE 650 MG: 650 TABLET ORAL at 13:32

## 2025-03-20 RX ADMIN — SODIUM CHLORIDE, PRESERVATIVE FREE 10 ML: 5 INJECTION INTRAVENOUS at 09:45

## 2025-03-20 RX ADMIN — PAROXETINE HYDROCHLORIDE 20 MG: 20 TABLET, FILM COATED ORAL at 09:40

## 2025-03-20 ASSESSMENT — PAIN DESCRIPTION - LOCATION: LOCATION: HEAD

## 2025-03-20 ASSESSMENT — PAIN DESCRIPTION - DESCRIPTORS: DESCRIPTORS: ACHING

## 2025-03-20 ASSESSMENT — PAIN SCALES - GENERAL
PAINLEVEL_OUTOF10: 5
PAINLEVEL_OUTOF10: 5

## 2025-03-20 NOTE — CARE COORDINATION
Chart reviewed day 5. Care managed by pulm and IM. Will need walk test prior to d/c. Has [facility], will need [employer] as provider. Gave notice to Mckenna with [employer]. Kevin Peña RN    Waiting walk testing. Kevin Peña RN

## 2025-03-20 NOTE — DISCHARGE INSTR - COC
order):  Oxygen  Other Treatments: na    Patient's personal belongings (please select all that are sent with patient):  None    RN SIGNATURE:  Electronically signed by SAVANNAH CORDOVA RN on 3/20/25 at 1:57 PM EDT    CASE MANAGEMENT/SOCIAL WORK SECTION    Inpatient Status Date: 3/15/25    Readmission Risk Assessment Score:  [facility] RISK OF UNPLANNED READMISSION 2.0             21.2 Total Score        Discharging to Facility/ Agency   [facility]  Services: Home Health Services   Address: [address] [place] [address], [place] [id number]   Phone: [phone]           / signature: Electronically signed by Kevin Peña RN on 3/20/25 at 1:26 PM EDT    PHYSICIAN SECTION    Prognosis: Fair    Condition at Discharge: Stable    Rehab Potential (if transferring to Rehab): Good    Recommended Labs or Other Treatments After Discharge:  f/u PCP, nephrology / pulmonology     Physician Certification: I certify the above information and transfer of Dinesh Partida  is necessary for the continuing treatment of the diagnosis listed and that he requires Home Care for less 30 days.     Update Admission H&P: No change in H&P    PHYSICIAN SIGNATURE:  Electronically signed by Ryan Sagastume MD on 3/20/25 at 12:05 PM EDT

## 2025-03-20 NOTE — CARE COORDINATION
Supportive documentation emailed to [facility]. Spoke with fabricio Andres to provide O2 tank for transport home. Will need to deliver concentrator to home today.           CASE MANAGEMENT DISCHARGE SUMMARY      Discharge to: home with [facility]      IMM given: 3/19    New Durable Medical Equipment ordered/agency: O2 provided by [employer]    Transportation: PVT after O2 delivered to home.      Confirmed discharge plan with:     Patient: yes     Family:  yes      Facility/Agency, name:  [facility] faxed pulled per Kevin orozco        RN, name: Jameson Peña RN

## 2025-03-20 NOTE — PROGRESS NOTES
03/16/25 0042   NIV Type   $NIV $Daily Charge   Equipment Type V60   Mode CPAP   Mask Type Full face mask   Mask Size Medium   Assessment   Pulse 73   Respirations 27   SpO2 93 %   Comfort Level Good   Using Accessory Muscles No   Mask Compliance Good   Skin Assessment Clean, dry, & intact   Skin Protection for O2 Device Yes   Location Nose   Settings/Measurements   PIP Observed 18 cm H20   CPAP/EPAP 16 cmH2O   Vt (Measured) 548 mL   FiO2  30 %   Minute Volume (L/min) 16 Liters   Mask Leak (lpm) 62 lpm   Patient's Home Machine No   Alarm Settings   Alarms On Y       
   03/16/25 0334   NIV Type   Equipment Type V60   Mode CPAP   Mask Type Full face mask   Mask Size Medium   Assessment   Respirations 26   Comfort Level Good   Using Accessory Muscles No   Mask Compliance Good   Skin Assessment Clean, dry, & intact   Skin Protection for O2 Device Yes   Location Nose   Settings/Measurements   PIP Observed 17 cm H20   CPAP/EPAP 16 cmH2O   Vt (Measured) 518 mL   FiO2  30 %   Minute Volume (L/min) 17 Liters   Mask Leak (lpm) 78 lpm   Patient's Home Machine No   Alarm Settings   Alarms On Y       
   03/16/25 0734   NIV Type   Equipment Type V60   Mode CPAP   Mask Type Full face mask   Mask Size Medium   Assessment   Pulse 73   Respirations 25   SpO2 94 %   Comfort Level Good   Using Accessory Muscles No   Mask Compliance Good   Skin Assessment Clean, dry, & intact   Skin Protection for O2 Device Yes   Location Nose   Breath Sounds   Respiratory Pattern Tachypneic   Breath Sounds Bilateral Diminished   Settings/Measurements   PIP Observed 17 cm H20   CPAP/EPAP 16 cmH2O   Vt (Measured) 721 mL   FiO2  30 %   Minute Volume (L/min) 12.9 Liters   Mask Leak (lpm) 14 lpm   Patient's Home Machine No   Alarm Settings   Alarms On Y   Oxygen Therapy/Pulse Ox   O2 Therapy Oxygen   $Oxygen $Daily Charge   O2 Device PAP (positive airway pressure)   $Pulse Oximeter $Spot check (multiple/continuous)       
   03/16/25 2319   NIV Type   NIV Started/Stopped On   Equipment Type v60   Mode CPAP   Mask Type Full face mask   Mask Size Medium   Assessment   Respirations 24   Settings/Measurements   CPAP/EPAP 16 cmH2O   Vt (Measured) 610 mL   FiO2  (S)  25 %   Minute Volume (L/min) 15 Liters   Mask Leak (lpm) 20 lpm   Alarm Settings   Alarms On Y   Low Pressure (cmH2O) 6 cmH2O   High Pressure (cmH2O) 30 cmH2O       
   03/17/25 0300   NIV Type   $NIV $Daily Charge   NIV Started/Stopped On   Equipment Type v60   Mode CPAP   Mask Type Full face mask   Mask Size Medium   Assessment   Respirations 25   Settings/Measurements   CPAP/EPAP 16 cmH2O   Vt (Measured) 585 mL   FiO2  25 %   Mask Leak (lpm) 26 lpm   Alarm Settings   Alarms On Y   Low Pressure (cmH2O) 6 cmH2O   High Pressure (cmH2O) 30 cmH2O       
   03/17/25 0744   NIV Type   NIV Started/Stopped On   Equipment Type v60   Mode CPAP   Mask Type Full face mask   Mask Size Medium   Assessment   SpO2 97 %   Comfort Level Good   Skin Protection for O2 Device Yes   Location Nose   Breath Sounds   Right Upper Lobe Diminished;Clear   Right Middle Lobe Diminished   Right Lower Lobe Diminished   Left Upper Lobe Diminished;Clear   Left Lower Lobe Diminished   Settings/Measurements   PIP Observed 16 cm H20   CPAP/EPAP 16 cmH2O   Vt (Measured) 627 mL   FiO2  25 %   Minute Volume (L/min) 9.9 Liters   Mask Leak (lpm) 8 lpm   Alarm Settings   Alarms On Y   Low Pressure (cmH2O) 6 cmH2O   High Pressure (cmH2O) 30 cmH2O   Delay Alarm 20 sec(s)   Apnea (secs) 20 secs   RR Low (bpm) 6   RR High (bpm) 40 br/min       
   03/17/25 2344   NIV Type   Equipment Type v60   Mode CPAP   Mask Type Full face mask   Mask Size Medium   Assessment   Pulse 76   Respirations 17   SpO2 94 %   Comfort Level Good   Using Accessory Muscles No   Mask Compliance Good   Skin Protection for O2 Device Yes   Orientation Bilateral;Middle   Location Cheek;Nose   Intervention(s) Skin Barrier   Settings/Measurements   PIP Observed 16 cm H20   CPAP/EPAP 16 cmH2O   Vt (Measured) 682 mL   FiO2  30 %   Minute Volume (L/min) 11 Liters   Mask Leak (lpm) 12 lpm   Patient's Home Machine No   Alarm Settings   Alarms On Y   Low Pressure (cmH2O) 6 cmH2O   High Pressure (cmH2O) 30 cmH2O   Apnea (secs) 20 secs   RR Low (bpm) 6   RR High (bpm) 40 br/min       
   03/18/25 0321   NIV Type   Equipment Type v60   Mode CPAP   Mask Type Full face mask   Mask Size Medium   Assessment   Respirations 23   SpO2 93 %   Comfort Level Good   Using Accessory Muscles No   Mask Compliance Good   Skin Protection for O2 Device Yes   Orientation Bilateral;Middle   Location Cheek;Nose   Intervention(s) Skin Barrier   Settings/Measurements   PIP Observed 17 cm H20   CPAP/EPAP 16 cmH2O   Vt (Measured) 539 mL   FiO2  30 %   Minute Volume (L/min) 10.4 Liters   Mask Leak (lpm) 19 lpm   Patient's Home Machine No   Alarm Settings   Alarms On Y   Low Pressure (cmH2O) 6 cmH2O   High Pressure (cmH2O) 30 cmH2O   Apnea (secs) 20 secs   RR Low (bpm) 6   RR High (bpm) 40 br/min       
   03/18/25 1548   NIV Type   NIV Started/Stopped On   Equipment Type v60   Mode CPAP   Mask Type Full face mask   Mask Size Medium   Assessment   Comfort Level Good   Using Accessory Muscles No   Mask Compliance Good   Skin Assessment Clean, dry, & intact   Skin Protection for O2 Device Yes   Orientation Bilateral;Middle   Location Cheek;Nose   Intervention(s) Skin Barrier   Settings/Measurements   PIP Observed 16 cm H20   CPAP/EPAP 16 cmH2O   Vt (Measured) 812 mL   FiO2  30 %   Minute Volume (L/min) 12.3 Liters   Mask Leak (lpm) 36 lpm   Patient's Home Machine No   Alarm Settings   Alarms On Y   Low Pressure (cmH2O) 6 cmH2O   High Pressure (cmH2O) 30 cmH2O   Apnea (secs) 20 secs   RR Low (bpm) 6   RR High (bpm) 40 br/min   Oxygen Therapy/Pulse Ox   O2 Device PAP (positive airway pressure)       
   03/18/25 1943   NIV Type   Equipment Type v60   Mode CPAP   Mask Type Full face mask   Mask Size Medium   Assessment   Respirations 16   SpO2 92 %   Comfort Level Good   Using Accessory Muscles No   Mask Compliance Good   Skin Protection for O2 Device Yes   Orientation Middle;Bilateral   Location Nose;Cheek   Intervention(s) Skin Barrier   Settings/Measurements   PIP Observed 16 cm H20   CPAP/EPAP 16 cmH2O   Vt (Measured) 828 mL   FiO2  30 %   Minute Volume (L/min) 10.3 Liters   Mask Leak (lpm) 7 lpm   Patient's Home Machine No   Alarm Settings   Alarms On Y   Low Pressure (cmH2O) 6 cmH2O   High Pressure (cmH2O) 30 cmH2O   Apnea (secs) 20 secs   RR Low (bpm) 6   RR High (bpm) 40 br/min       
   03/18/25 2323   NIV Type   Equipment Type v60   Mode CPAP   Mask Type Full face mask   Mask Size Medium   Assessment   Respirations 20   SpO2 95 %   Comfort Level Good   Using Accessory Muscles No   Mask Compliance Good   Skin Protection for O2 Device Yes   Orientation Bilateral;Middle   Location Cheek;Nose   Intervention(s) Skin Barrier   Settings/Measurements   PIP Observed 17 cm H20   CPAP/EPAP 16 cmH2O   Vt (Measured) 612 mL   FiO2  35 %   Minute Volume (L/min) 12.6 Liters   Mask Leak (lpm) 38 lpm   Patient's Home Machine No   Alarm Settings   Alarms On Y   Low Pressure (cmH2O) 6 cmH2O   High Pressure (cmH2O) 30 cmH2O   Apnea (secs) 20 secs   RR Low (bpm) 6   RR High (bpm) 40 br/min       
   03/19/25 0349   NIV Type   Equipment Type v60   Mode CPAP   Mask Type Full face mask   Mask Size Medium   Assessment   Pulse 78   Respirations 16   SpO2 96 %   Comfort Level Good   Using Accessory Muscles No   Mask Compliance Good   Skin Protection for O2 Device Yes   Orientation Bilateral;Middle   Location Cheek;Nose   Intervention(s) Skin Barrier   Settings/Measurements   PIP Observed 16 cm H20   CPAP/EPAP 16 cmH2O   Vt (Measured) 638 mL   FiO2  35 %   Minute Volume (L/min) 10.4 Liters   Mask Leak (lpm) 22 lpm   Patient's Home Machine No   Alarm Settings   Alarms On Y   Low Pressure (cmH2O) 6 cmH2O   High Pressure (cmH2O) 30 cmH2O   Apnea (secs) 20 secs   RR Low (bpm) 6   RR High (bpm) 40 br/min       
   03/19/25 0806   NIV Type   Equipment Type v60   Mode CPAP   Mask Type Full face mask   Mask Size Medium   Assessment   Pulse 71   SpO2 97 %   Comfort Level Good   Using Accessory Muscles No   Mask Compliance Good   Skin Assessment Other (comment/note)  (GÓMEZ)   Skin Protection for O2 Device No   Settings/Measurements   PIP Observed 16 cm H20   CPAP/EPAP 16 cmH2O   Vt (Measured) 653 mL   FiO2  35 %   Minute Volume (L/min) 10.7 Liters   Mask Leak (lpm) 1 lpm   Patient's Home Machine No       
   03/19/25 2100   NIV Type   NIV Started/Stopped On   Equipment Type v60   Mode Bilevel   Mask Type Full face mask   Mask Size Medium   Assessment   Respirations 14   SpO2 95 %   Comfort Level Good   Using Accessory Muscles No   Mask Compliance Good   Skin Assessment Clean, dry, & intact   Skin Protection for O2 Device Yes   Orientation Bilateral;Middle   Location Cheek;Nose   Intervention(s) Skin Barrier   Settings/Measurements   PIP Observed 16 cm H20   CPAP/EPAP 16 cmH2O   Vt (Measured) 659 mL   FiO2  35 %   Minute Volume (L/min) 16.6 Liters   Mask Leak (lpm) 42 lpm   Patient's Home Machine No   Alarm Settings   Alarms On Y   Low Pressure (cmH2O) 6 cmH2O   High Pressure (cmH2O) 30 cmH2O   Apnea (secs) 20 secs   RR Low (bpm) 6   RR High (bpm) 40 br/min       
   03/20/25 0002   NIV Type   $NIV $Daily Charge   Equipment Type v60   Mode Bilevel   Mask Type Full face mask   Mask Size Medium   Assessment   Respirations 23   SpO2 95 %   Comfort Level Good   Using Accessory Muscles No   Mask Compliance Good   Skin Protection for O2 Device Yes   Orientation Bilateral;Middle   Location Cheek;Nose   Intervention(s) Skin Barrier   Settings/Measurements   PIP Observed 15 cm H20   CPAP/EPAP 16 cmH2O   Vt (Measured) 518 mL   FiO2  35 %   Minute Volume (L/min) 12 Liters   Mask Leak (lpm) 28 lpm   Patient's Home Machine No   Alarm Settings   Alarms On Y   Low Pressure (cmH2O) 6 cmH2O   High Pressure (cmH2O) 30 cmH2O   Apnea (secs) 20 secs   RR Low (bpm) 6   RR High (bpm) 40 br/min       
   03/20/25 0344   NIV Type   Equipment Type v60   Mode Bilevel   Mask Type Full face mask   Mask Size Medium   Assessment   Respirations 24   SpO2 94 %   Comfort Level Good   Using Accessory Muscles No   Mask Compliance Good   Skin Protection for O2 Device Yes   Orientation Bilateral;Middle   Location Cheek;Nose   Intervention(s) Skin Barrier   Settings/Measurements   PIP Observed 17 cm H20   CPAP/EPAP 16 cmH2O   Vt (Measured) 435 mL   FiO2  35 %   Minute Volume (L/min) 11.6 Liters   Mask Leak (lpm) 12 lpm   Patient's Home Machine No   Alarm Settings   Alarms On Y   Low Pressure (cmH2O) 6 cmH2O   High Pressure (cmH2O) 30 cmH2O   Apnea (secs) 20 secs   RR Low (bpm) 6   RR High (bpm) 40 br/min       
  4 Eyes Skin Assessment and Patient belongings     The patient is being assess for  Admission    I agree that 2 Nurses have performed a thorough Head to Toe Skin Assessment on the patient. ALL assessment sites listed below have been assessed.       Areas assessed by both nurses: Rea RN/Dianna RN  [x]   Head, Face, and Ears   [x]   Shoulders, Back, and Chest  [x]   Arms, Elbows, and Hands   [x]   Coccyx, Sacrum, and IschIum  [x]   Legs, Feet, and Heels        Does the Patient have Skin Breakdown?  No         Thee Prevention initiated:  NA   Wound Care Orders initiated:  NA      [facility] nurse consulted for Pressure Injury (Stage 3,4, Unstageable, DTI, NWPT, and Complex wounds), New and Established Ostomies:  NA      I agree that 2 Nurses have reviewed patient belongings with the patient/family and documented in the flowsheet upon admission or transfer to the unit.     Belongings  Dental Appliances: None, At home  Vision - Corrective Lenses: None, At home  Hearing Aid: None  Clothing: Shirt, Shorts, Undergarments, Footwear  Jewelry: Bracelet  Electronic Devices: Cell Phone  Weapons (Notify Protective Services/Security): None  Home Medications: None  Valuables Given To: Patient  Provide Name(s) of Who Valuable(s) Were Given To: at bedside       Nurse 1 eSignature: Electronically signed by Rea Handy RN on 3/16/25 at 1:34 AM EDT    **SHARE this note so that the co-signing nurse is able to place an eSignature**    Nurse 2 eSignature: Electronically signed by Dianna Melgoza RN on 3/16/25 at 6:33 AM EDT  
  [facility] Medicine Progress Note  V 1.6      Date of Admission: 3/15/2025    Hospital Day: 5      Chief Admission Complaint:  SOB    Subjective:    no HA or back pain , up in the chair , no worsening cough fever or hemoptysis        Presenting Admission History:       59 y.o. male, with PMH of obesity, DM, CHF, PAF, SINDY, who presented to [facility] with shortness of breath. History obtained from the patient and review of EMR.  The patient states has been experiencing intermittent shortness of breath with a cough.  He reports that his cough is mild and nonproductive.  The patient reported having to wear his nighttime oxygen to keep his oxygen saturation greater than 90%.  In the emergency department, the patient was found to be hypoxic at 88% on room air.  VBG was obtained that revealed a pH of 7.37 and pCO2 of 44.  Patient stated he was just admitted here at [facility] from 3/7/2025 - 3/13/2025.  At that time he was being treated for acute hypoxic respiratory failure concerning for COPD.  At that time, he was worked up for his PE, which was negative. In the emergency department a CXR revealed Mild bilateral basilar airspace disease. Cardiomegaly. The patient's Influenza B and COVID-19 were negative. However, his Influenza A was positive. He was given a Duoneb treatment and started on tamiflu in the ED. The patient was admitted for further evaluation and treatment. He denied any other associated symptoms as well as any aggravating and/or alleviating factors. At the time of this assessment, the patient was resting comfortably in bed. He currently denies any chest pain, back pain, abdominal pain, shortness of breath, numbness, tingling, N/V/C/D, fever and/or chills.     Assessment/Plan:      Current Principal Problem:  Acute respiratory failure with hypoxia (HCC)    Acute hypoxic respiratory failure likely 2/2 Influenza A. 88% on RA, now requiring 2 -3 LNC  -CXR revealed: Mild bilateral basilar airspace 
Assessment completed and documented. VSS. A/ox4. Pt on 3L NC O2 at 90% baseline RA. Pt uses CPAP at night. Pt denies using any assistive devise to ambulate at home. Pt states he feels too weak and tired to ambulated out of bed today, educated pt to call this nurse before getting out of bed for pt safety, bed alarm on.  Bed locked and in lowest position. Bedside table and call light within reach. Denies further needs at this time.   
Awake on bed, alert and oriented  x 4. IV on left AC. On oxygen at 3 lpm nasal cannula. Call light within reach. Side rails x 2. Denies further needs. Will continue to monitor.  
Awake on bed, alert and oriented  x 4. IV on left AC. Reports episode of hemoptysis, estimate 5-10mls. Notified Pedro Howe NP via [employer], Held eliquis dose tonight as ordered. Denies pain. Side rails x 2. Call light within reach. Will continue to monitor.  
Oxygen documentation:     O2 saturation at REST on ROOM AIR = ___89__%     If saturation is 89% or above please proceed with steps 2 and 3..........     O2 saturation with AMBULATION of ___50__ feet on ROOM AIR = ___89__%   O2 saturation with AMBULATION on current liter flow = ___3___%  92%    [facility] notified: ______     Signature: _________________________ Date: __________   Time: ______   
Oxygen documentation:     O2 saturation at REST on ROOM AIR = ____92__%     If saturation is 89% or above please proceed with steps 2 and 3..........     O2 saturation with AMBULATION of __50___ feet on ROOM AIR = __88___%   O2 saturation with AMBULATION on current liter flow = _3LNC_93____%     DCP notified: _yes_____     Signature: __Jameson Rangel RN___________ Date: __3/20/2025____   Time: 1141   
PULMONARY AND CRITICAL CARE INPATIENT NOTE        Dinesh Partida   : 1965  MRN: [id number]     Admitting Physician: Leighton Abdul MD  Attending Physician: Ryan Sagastume MD  PCP: Zee Patel MD    Date of Service: 3/20/2025  Admission date:3/15/2025   LOS: Hospital Day: 6   Code:Full Code      ASSESSMENT & PLAN       59 y.o. male patient was admitted on 3/15/2025 with  Chief Complaint   Patient presents with    Chest Pain     Started 2 hours ago. Asa 324 mg per EMS       Acute hypoxic respiratory failure  Influenza A pneumonia  Recurrent mild hemoptysis  Recent pneumonia.  Improving follow-up imaging  Persistent sinus congestion with postnasal drip  A-fib on apixaban  Severe SINDY on CPAP with pressure of 16  COPD/emphysema    Obesity class III      Plan:              Complete course of Tamiflu  Discussed with the patient the option of doing bronchoscopy today to evaluate the area especially that he had recurrent admissions and some hemoptysis.  The opacity on the x-ray is in the same area where the patient had consolidation on the last 2 scans.  Patient has no interest in proceeding with bronchoscopy currently.  Continue diuresis as tolerated clinically and by kidney function  Continue heart failure regimen  BiPAP at bedtime  Rest and exercise oximetry before discharge  Pulmonary toilet in the hospital and after discharge with incentive spirometry  Aspiration precautions  If the patient discharges today he needs follow-up in the pulmonary clinic with chest x-ray in 4 weeks.  If his condition worsens I strongly recommend to proceed with bronchoscopy      Subjective/Objective     Interval History: Encounter 3/20/2025  Patient feels well and slept well on the V60 CPAP  He wants to go home today  He had minimal blood in his sputum.              CC/Reason for Consult: h/o CHF, CAD, here with acute hypoxia       HPI: 2025  59-year-old male patient with PMH of SINDY on CPAP, COPD/emphysema 
Patient discharged from facility to home. Patient driven home by girlfriend. O2 take supplied by girlfriend. Patient declined to take O2 tank provided by the  as the patient was able to use a tank from recent home delivery. Patient understood discharge instructions. All questions answered. IV removed.   
Physical and Occupational Therapy    Attempted to see patient for therapy. Pt trying to rest and reports headache. Requesting therapy come back later. Will continue to follow.    Ya Sweet, [place] [id number]  Claire Valles, OTR/L  
Pt admitted from ED. A/Ox4, VSS. Admission assessment completed and charted. Pt oriented to room, bed, and call light. Pt medicated per PRN order for c/o back pain. Obtained order from MD for CPAP and RT notified. Pt in bed with call light within reach, wife at bedside.   
Pt ambulated 50ft in halls, pt became dizzy and diaphoretic and was seeing spots. 02 sat dipped to 89% when ambulating. Pt requested to go back to his chair. Placed on 3L NC to keep sats >90%. MD notified.  
Pt assessment completed and charted. VSS. Pt a/ox4. Pt reported some back pain 6/10 this morning, pt educated on prn pain meds, meds given per mar. Pt has been wearing CPAP and napping most of the day, CPAP at night at home as well. Pt tolerating diet. Pt uses urinal at bedside. Pt denies any other needs at this time.  Pt calls out appropriately.       Pt is a fall risk;  -Bed in lowest position and wheels locked.  -Call light within reach.   -Bedside table within reach.   -Non-skid footwear in place.    
Pt called out c/o CP. To bedside to assess and get VS. Stat EKG ordered. Pt c/o 7/10 aching CP that came on out of no where and is slowly getting better. He said he has had this pain before and takes NITRO at home. When this RN asked him to point to pain he actually pointed to LUQ. PS sent to MD to let her know. EKG called and notified of stat order.   
Pt refusing to get out of bed to chair. Educated pt on the importance of getting out of bed. Explained to pt that he will become weaker and it aides with lung support.   
Seen on bed, oriented  x 4. On oxygen at 3 lpm nasal cannula. IV on left AC. Encouraged use of incentive spirometry. Side rails x 2. Denies further needs. Will continue to monitor.  
Federico Haddad     Chest x-ray November 19, 2025  IMPRESSION:     Alveolar consolidation in the right lung base concerning for pneumonia.     The left lung is clear.     Cardiomegaly noting bilateral subclavian pacemakers.     Electronically signed by Nikhil Hammond      Cardiology:      Labs reviewed:  CBC:   Recent Labs     03/18/25  0552 03/18/25  0950 03/19/25  1307   WBC 4.2 3.8* 4.0   HGB 16.0 15.9 16.1   HCT 48.4 48.3 49.9   MCV 88.0 88.0 90.1   * 117* 114*     BMP:   Recent Labs     03/18/25  0552 03/19/25  1307   * 134*   K 3.9 4.1   CL 99 101   CO2 17* 16*   BUN 43* 33*   CREATININE 1.8* 1.6*     LIVER PROFILE:   No results for input(s): \"AST\", \"ALT\", \"LIPASE\", \"AMYLASE\", \"BILIDIR\", \"BILITOT\", \"ALKPHOS\" in the last 72 hours.    Invalid input(s): \"ALB\"    PT/INR: No results for input(s): \"PROTIME\", \"INR\" in the last 72 hours.       Vitals:    03/19/25 1430   BP:    Pulse: 70   Resp:    Temp: 97.3 °F (36.3 °C)   SpO2: 95%       Physical Exam:  General appearance: In no apparent distress.  HEENT: Anicteric.  Cardiac: No loud murmur.  Lungs: Decreased air entry over the lung bases.  Some crackles over both lung bases more on the right side  Abdomen: Soft.  Back & Extremities: No clubbing.  No signs of acute ischemia.  Neurological: No focal deficit.      ________________________________________________________  Electronically signed by:  Smita Levi MD,FACP    3/19/2025    5:47 PM.     [facility]  [address], [place] [address]   Phone (office): [phone]     
mobility, prevention of complications of bedrest, and general safety during hospitalization  Education Method: Demonstration;Verbal  Barriers to Learning: None  Education Outcome: Verbalized understanding;Continued education needed      AM-PAC - ADL  AM-PAC Daily Activity - Inpatient   How much help is needed for putting on and taking off regular lower body clothing?: A Little  How much help is needed for bathing (which includes washing, rinsing, drying)?: A Little  How much help is needed for toileting (which includes using toilet, bedpan, or urinal)?: A Little  How much help is needed for putting on and taking off regular upper body clothing?: A Little  How much help is needed for taking care of personal grooming?: A Little  How much help for eating meals?: None  AM-PAC Inpatient Daily Activity Raw Score: 19  AM-PAC Inpatient ADL T-Scale Score : 40.22  ADL Inpatient CMS 0-100% Score: 42.8  ADL Inpatient CMS G-Code Modifier : CK    Goals  Short Term Goals  Time Frame for Short Term Goals: 3/25/25, unless otherwise noted  Short Term Goal 1: Pt will complete functional transfer/toilet transfer with IND  Short Term Goal 2: Pt will complete toileting with IND  Short Term Goal 3: Pt will complete LB dressing with IND by 3/23  Short Term Goal 4: Pt will complete dynamic standing grooming tasks (2-4 tasks) with IND  Patient Goals   Patient goals : did not state      Therapy Time   Individual Concurrent Group Co-treatment   Time In 1018         Time Out 1039         Minutes 21         Timed Code Treatment Minutes: 11 Minutes (10 minute eval)       Iris Fontaine OT     
turning from your back to your side while in a flat bed without using bedrails?: None  How much help is needed moving from lying on your back to sitting on the side of a flat bed without using bedrails?: None  How much help is needed moving to and from a bed to a chair?: A Little  How much help is needed standing up from a chair using your arms?: A Little  How much help is needed walking in hospital room?: A Little  How much help is needed climbing 3-5 steps with a railing?: A Little  AM-PAC Inpatient Mobility Raw Score : 20  AM-PAC Inpatient T-Scale Score : 47.67  Mobility Inpatient CMS 0-100% Score: 35.83  Mobility Inpatient CMS G-Code Modifier : CJ              Goals  Short Term Goals  Time Frame for Short Term Goals: 3/26  Short Term Goal 1: pt will perform STS with no AD and INDEP  Short Term Goal 2: pt will transfer bed to chair with no AD and supervision  Short Term Goal 3: pt will ambulate 100 ft with no AD and supervision  Short Term Goal 4: pt will participate in BLE exercises x10 INDEP by 3/24  Patient Goals   Patient Goals : did not state       Education  Patient Education  Education Given To: Patient  Education Provided: Role of Therapy;Plan of Care;Transfer Training;Mobility Training;Precautions  Education Provided Comments: Disease Specific Education: Pt educated on importance of OOB mobility, prevention of complications of bedrest, and general safety during hospitalization.  Education Method: Verbal  Barriers to Learning: Readiness to Learn  Education Outcome: Verbalized understanding;Continued education needed      Therapy Time   Individual Concurrent Group Co-treatment   Time In       1018   Time Out       1039   Minutes       21   Timed Code Treatment Minutes: 11 Minutes (10 min eval)       Beatrice Cano, PT         
1,438*  --    TROPHS 21 20     No results for input(s): \"LABA1C\" in the last 72 hours.  Recent Labs     03/15/25  1646   AST 33   ALT 88*   BILITOT 0.6   ALKPHOS 78     No results for input(s): \"INR\", \"LACTA\", \"TSH\" in the last 72 hours.    Urine Cultures: No results found for: \"LABURIN\"  Blood Cultures:   Lab Results   Component Value Date/Time    BC No Growth after 4 days of incubation. 02/02/2025 11:14 PM     Lab Results   Component Value Date/Time    BLOODCULT2 No Growth after 4 days of incubation. 02/02/2025 11:14 PM     Organism: No results found for: \"ORG\"      Ryan Sagastume MD    
Labs     03/15/25  1646 03/15/25  1749   PROBNP 1,438*  --    TROPHS 21 20     No results for input(s): \"LABA1C\" in the last 72 hours.  Recent Labs     03/15/25  1646   AST 33   ALT 88*   BILITOT 0.6   ALKPHOS 78     No results for input(s): \"INR\", \"LACTA\", \"TSH\" in the last 72 hours.    Urine Cultures: No results found for: \"LABURIN\"  Blood Cultures:   Lab Results   Component Value Date/Time    BC No Growth after 4 days of incubation. 02/02/2025 11:14 PM     Lab Results   Component Value Date/Time    BLOODCULT2 No Growth after 4 days of incubation. 02/02/2025 11:14 PM     Organism: No results found for: \"ORG\"      Sergio Macias MD    
for clinical significance as documented above.     Recent Labs     03/15/25  1646 03/16/25  0556   WBC 9.9 8.9   HGB 18.5* 17.0   HCT 54.3* 50.5    143     Recent Labs     03/15/25  1646 03/16/25  0556   * 132*   K 4.2 3.9   CL 96* 97*   CO2 22 20*   BUN 34* 38*   CREATININE 1.8* 2.0*   CALCIUM 9.2 8.7     Recent Labs     03/15/25  1646 03/15/25  1749 03/17/25  1711 03/18/25  0552   PROBNP 1,438*  --   --  403*   TROPHS 21 20 26*  --      No results for input(s): \"LABA1C\" in the last 72 hours.  Recent Labs     03/15/25  1646   AST 33   ALT 88*   BILITOT 0.6   ALKPHOS 78     No results for input(s): \"INR\", \"LACTA\", \"TSH\" in the last 72 hours.    Urine Cultures: No results found for: \"LABURIN\"  Blood Cultures:   Lab Results   Component Value Date/Time    BC No Growth after 4 days of incubation. 02/02/2025 11:14 PM     Lab Results   Component Value Date/Time    BLOODCULT2 No Growth after 4 days of incubation. 02/02/2025 11:14 PM     Organism: No results found for: \"ORG\"      Ryan Sagastume MD

## 2025-03-20 NOTE — PLAN OF CARE
Problem: Chronic Conditions and Co-morbidities  Goal: Patient's chronic conditions and co-morbidity symptoms are monitored and maintained or improved  Outcome: Adequate for Discharge     Problem: Pain  Goal: Verbalizes/displays adequate comfort level or baseline comfort level  Outcome: Adequate for Discharge     Problem: Safety - Adult  Goal: Free from fall injury  Outcome: Adequate for Discharge     Problem: ABCDS Injury Assessment  Goal: Absence of physical injury  Outcome: Adequate for Discharge     Problem: Respiratory - Adult  Goal: Achieves optimal ventilation and oxygenation  Outcome: Adequate for Discharge

## 2025-03-20 NOTE — DISCHARGE SUMMARY
regurgitation. No stenosis.   Mitral Valve: Mild regurgitation.   Tricuspid Valve: Trace regurgitation. Unable to assess RVSP due to inadequate or insignificant tricuspid regurgitation.   Pericardium: No pericardial effusion.   IVC/SVC: IVC was not well visualized.     US RENAL COMPLETE  Result Date: 3/8/2025  EXAM: US RENAL COMPLETE INDICATION: Acute on chronic kidney injury COMPARISON: None TECHNIQUE: Grayscale and color Doppler imaging acquisition was performed for evaluation of the kidneys and urinary bladder. FINDINGS: The right kidney measures 11.8 x 4.9 x 5.6 cm in size. The left kidney measures 11.9 x 5.3 x 7 cm in size. The kidneys demonstrate normal echogenicity. There is no hydronephrosis. No perinephric fluid is present. 3.3 cm simple right superior pole renal cyst not requiring further follow-up.. Evaluation of the bladder is limited, but is grossly unremarkable. Bilateral ureteral jets are noted.     Normal ultrasound of the kidneys and urinary tract. Electronically signed by Irvin Merino MD    CT CHEST PULMONARY EMBOLISM W CONTRAST  Result Date: 3/7/2025  EXAM: CT CHEST WITH CONTRAST CLINICAL  INDICATION: hemoptysis COMPARISON : 2/3/2025 TECHNIQUE: CT chest was performed according to the PE protocol following intravenous contrast administration.  Multiple reformats.   3-D MIP reformats were performed. Unless otherwise specified, incidental findings do not require dedicated imaging follow-up. This exam was performed according to our departmental dose-optimization program, which includes automated exposure control, adjustment of the mA and/or kV according to patient size and/or use of iterative reconstruction technique. CONTRAST: Contrast per protocol. Route of Administration: IV FINDINGS: The visualized pulmonary artery segments show normal contrast opacification without evidence for pulmonary embolism. Lungs demonstrate persistent consolidation in the right middle lobe with interval improvement

## 2025-05-31 LAB
ALBUMIN: 3.6 G/DL (ref 3.5–5.7)
ANION GAP SERPL CALCULATED.3IONS-SCNC: 9 MMOL/L (ref 4–16)
BUN BLDV-MCNC: 31 MG/DL (ref 8–26)
CALCIUM SERPL-MCNC: 8.5 MG/DL (ref 8.5–10.4)
CHLORIDE BLD-SCNC: 105 MMOL/L (ref 98–111)
CO2: 21 MMOL/L (ref 21–31)
CREAT SERPL-MCNC: 2.03 MG/DL (ref 0.7–1.3)
EGFR (CKD-EPI): 37 ML/MIN/1.73 M2
ESTIMATED AVERAGE GLUCOSE: 206 MG/DL
GLUCOSE BLD-MCNC: 206 MG/DL (ref 70–99)
HBA1C MFR BLD: 8.8 % (ref 4.2–5.6)
HCT VFR BLD CALC: 50.3 % (ref 40–50)
HEMOGLOBIN: 16.1 G/DL (ref 13.5–16.5)
MAGNESIUM: 2.2 MG/DL (ref 1.7–2.4)
MCH RBC QN AUTO: 28.2 PG (ref 27–33)
MCHC RBC AUTO-ENTMCNC: 32 G/DL (ref 32–36)
MCV RBC AUTO: 88.1 FL (ref 82–97)
PDW BLD-RTO: 16.1 % (ref 12.3–17)
PHOSPHORUS: 3.4 MG/DL (ref 2.5–4.5)
PLATELET # BLD: 177 THOU/MCL (ref 140–375)
PMV BLD AUTO: 7.8 FL (ref 7.4–11.5)
POTASSIUM SERPL-SCNC: 4.6 MMOL/L (ref 3.6–5.1)
RBC # BLD: 5.71 MIL/MCL (ref 4.4–5.8)
SODIUM BLD-SCNC: 135 MMOL/L (ref 135–145)
WBC # BLD: 12.1 THOU/MCL (ref 3.6–10.5)